# Patient Record
Sex: FEMALE | Race: WHITE | NOT HISPANIC OR LATINO | Employment: UNEMPLOYED | URBAN - NONMETROPOLITAN AREA
[De-identification: names, ages, dates, MRNs, and addresses within clinical notes are randomized per-mention and may not be internally consistent; named-entity substitution may affect disease eponyms.]

---

## 2017-09-13 ENCOUNTER — OFFICE VISIT (OUTPATIENT)
Dept: OBSTETRICS AND GYNECOLOGY | Facility: CLINIC | Age: 47
End: 2017-09-13

## 2017-09-13 VITALS
BODY MASS INDEX: 25.01 KG/M2 | DIASTOLIC BLOOD PRESSURE: 66 MMHG | HEIGHT: 68 IN | WEIGHT: 165 LBS | SYSTOLIC BLOOD PRESSURE: 110 MMHG

## 2017-09-13 DIAGNOSIS — L90.0 LICHEN SCLEROSUS: ICD-10-CM

## 2017-09-13 DIAGNOSIS — N95.1 MENOPAUSAL SYMPTOMS: ICD-10-CM

## 2017-09-13 DIAGNOSIS — Z01.419 WELL WOMAN EXAM WITH ROUTINE GYNECOLOGICAL EXAM: Primary | ICD-10-CM

## 2017-09-13 DIAGNOSIS — E03.8 OTHER SPECIFIED HYPOTHYROIDISM: ICD-10-CM

## 2017-09-13 PROCEDURE — 99396 PREV VISIT EST AGE 40-64: CPT | Performed by: NURSE PRACTITIONER

## 2017-09-13 RX ORDER — LEVOTHYROXINE SODIUM 112 UG/1
112 TABLET ORAL DAILY
Qty: 30 TABLET | Refills: 12 | Status: SHIPPED | OUTPATIENT
Start: 2017-09-13 | End: 2018-07-30 | Stop reason: SDUPTHER

## 2017-09-13 NOTE — PROGRESS NOTES
"Subjective   Heidi Fernandez is a 46 y.o. female     HPI Comments: Here for yearly check up. Has no complaints.    Gynecologic Exam   The patient's pertinent negatives include no pelvic pain, vaginal bleeding or vaginal discharge. The patient is experiencing no pain. Pertinent negatives include no abdominal pain, anorexia, back pain, chills, constipation, diarrhea, discolored urine, dysuria, fever, flank pain, frequency, headaches, hematuria, joint pain, joint swelling, nausea, painful intercourse, rash, sore throat, urgency or vomiting. She is sexually active. She uses hysterectomy for contraception.             /66  Ht 68\" (172.7 cm)  Wt 165 lb (74.8 kg)  LMP 08/31/2012  BMI 25.09 kg/m2      Outpatient Encounter Prescriptions as of 9/13/2017   Medication Sig Dispense Refill   • AMITRIPTYLINE HCL PO Take  by mouth daily.     • clobetasol (TEMOVATE) 0.05 % cream Apply 1 application topically 2 (two) times a day. 45 g 12   • estrogens, conjugated, (PREMARIN) 0.3 MG tablet Take 1 tablet by mouth Daily. 30 tablet 12   • levothyroxine (SYNTHROID, LEVOTHROID) 112 MCG tablet Take 1 tablet by mouth Daily. 30 tablet 12   • verapamil (CALAN) 80 MG tablet Take 80 mg by mouth daily.     • [DISCONTINUED] estrogens, conjugated, (PREMARIN) 0.3 MG tablet Take 1 tablet by mouth daily. Take daily for 21 days then do not take for 7 days. 30 tablet 12   • [DISCONTINUED] levothyroxine (SYNTHROID, LEVOTHROID) 112 MCG tablet Take 1 tablet by mouth daily. 30 tablet 12   • [DISCONTINUED] phentermine 37.5 MG capsule Take 1 capsule by mouth every morning. 30 capsule 0     No facility-administered encounter medications on file as of 9/13/2017.            Surgical History  Past Surgical History:   Procedure Laterality Date   • BREAST BIOPSY Right    • CYSTOSCOPY  10/29/2012   • DILATATION AND CURETTAGE  09/13/2012   • HYSTERECTOMY     • LAPAROSCOPY WITH LASER     • OTHER SURGICAL HISTORY  10/29/2012    [facility] W/T/O 250 G OR LESS: Lap Hyst " and BSO   • PAP SMEAR  07/22/2010    normal         Family History  Family History   Problem Relation Age of Onset   • Hypertension Mother    • Other Mother      cerebrovascular accident   • No Known Problems Father    • No Known Problems Sister    • No Known Problems Brother    • Cancer Maternal Aunt      breast   • Breast cancer Maternal Aunt    • Cancer Cousin      breast   • Breast cancer Cousin    • Ovarian cancer Neg Hx    • Uterine cancer Neg Hx    • Colon cancer Neg Hx    • Melanoma Neg Hx    • Prostate cancer Neg Hx              The following portions of the patient's history were reviewed and updated as appropriate: allergies, current medications, past family history, past medical history, past social history, past surgical history and problem list.    Review of Systems   Constitutional: Negative for activity change, appetite change, chills, diaphoresis, fatigue, fever and unexpected weight change.   HENT: Negative for congestion, ear discharge, ear pain, facial swelling, hearing loss, mouth sores, nosebleeds, postnasal drip, rhinorrhea, sinus pressure, sneezing, sore throat, tinnitus, trouble swallowing and voice change.    Eyes: Negative for photophobia, pain, discharge, redness, itching and visual disturbance.   Respiratory: Negative for apnea, cough, choking, chest tightness and shortness of breath.    Cardiovascular: Negative for chest pain, palpitations and leg swelling.   Gastrointestinal: Negative for abdominal distention, abdominal pain, anal bleeding, anorexia, blood in stool, constipation, diarrhea, nausea, rectal pain and vomiting.   Endocrine: Negative for cold intolerance and heat intolerance.   Genitourinary: Negative for decreased urine volume, difficulty urinating, dyspareunia, dysuria, flank pain, frequency, genital sores, hematuria, menstrual problem, pelvic pain, urgency, vaginal bleeding, vaginal discharge and vaginal pain.   Musculoskeletal: Negative for arthralgias, back pain, joint  pain, joint swelling and myalgias.   Skin: Negative for color change and rash.   Allergic/Immunologic: Negative for environmental allergies.   Neurological: Negative for dizziness, syncope, weakness, numbness and headaches.   Hematological: Negative for adenopathy.   Psychiatric/Behavioral: Negative for agitation, confusion and sleep disturbance. The patient is not nervous/anxious.              Objective   Physical Exam   Constitutional: She is oriented to person, place, and time. She appears well-developed and well-nourished.   HENT:   Head: Normocephalic and atraumatic.   Right Ear: External ear normal.   Left Ear: External ear normal.   Eyes: Conjunctivae and EOM are normal. Right eye exhibits no discharge. Left eye exhibits no discharge. No scleral icterus.   Neck: Normal range of motion. Neck supple. Carotid bruit is not present. No thyromegaly present.   Cardiovascular: Regular rhythm and normal heart sounds.    No murmur heard.  Pulmonary/Chest: Effort normal and breath sounds normal. No respiratory distress. Right breast exhibits no inverted nipple, no mass, no nipple discharge, no skin change and no tenderness. Left breast exhibits no inverted nipple, no mass, no nipple discharge, no skin change and no tenderness. Breasts are symmetrical. There is no breast swelling.   Abdominal: Soft. Bowel sounds are normal. She exhibits no distension and no mass. There is no tenderness. There is no guarding. No hernia. Hernia confirmed negative in the right inguinal area and confirmed negative in the left inguinal area.   Genitourinary: Vagina normal. Rectal exam shows no mass. No breast tenderness, discharge or bleeding. There is no rash, tenderness, lesion or injury on the right labia. There is no rash, tenderness, lesion or injury on the left labia. No erythema or bleeding in the vagina. No signs of injury around the vagina. No vaginal discharge found.   Genitourinary Comments: Cervix, Uterus and Adnexa are surgically  absent.  Urethra and urethral meatus normal  Bladder, normal no prolapse  Perineum and anus examined and without lesions   Musculoskeletal: Normal range of motion. She exhibits no edema or tenderness.   Lymphadenopathy:        Head (right side): No submental, no submandibular, no tonsillar, no preauricular, no posterior auricular and no occipital adenopathy present.        Head (left side): No submental, no submandibular, no tonsillar, no preauricular, no posterior auricular and no occipital adenopathy present.     She has no cervical adenopathy.        Right cervical: No superficial cervical, no deep cervical and no posterior cervical adenopathy present.       Left cervical: No superficial cervical, no deep cervical and no posterior cervical adenopathy present.     She has no axillary adenopathy.        Right: No inguinal adenopathy present.        Left: No inguinal adenopathy present.   Neurological: She is alert and oriented to person, place, and time. She exhibits normal muscle tone. Coordination normal.   Skin: Skin is warm and dry. No bruising and no rash noted. No erythema.   Psychiatric: She has a normal mood and affect. Her behavior is normal. Judgment and thought content normal.   Nursing note and vitals reviewed.        Assessment/Plan   Heidi was seen today for gynecologic exam.    Diagnoses and all orders for this visit:    Well woman exam with routine gynecological exam  Normal GYN exam. Will RTO for lab work. Encouraged SBE. Discussed weight management and importance of maintaining a healthy weight. Discussed Vitamin D intake and the importance of adequate vitamin D. Mammogram will be scheduled at [facility].  -     CBC & Differential  -     Comprehensive Metabolic Panel  -     Lipid Panel With LDL / HDL Ratio  -     TSH  -     T3, Uptake  -     Vitamin D 25 Hydroxy  -     T4, Free  -     Hemoglobin A1c  -     Urinalysis With / Microscopic If Indicated  -     UA / M With / Rflx Culture([facility] ONLY)  -      Mammo Screening Digital Tomosynthesis Bilateral With CAD; Future    Menopausal symptoms  Comments:  Doing well with Premarin tablets.  Orders:  -     estrogens, conjugated, (PREMARIN) 0.3 MG tablet; Take 1 tablet by mouth Daily.    Other specified hypothyroidism  Comments:  Doing well with Synthroid. Will RTO for labs.  Orders:  -     levothyroxine (SYNTHROID, LEVOTHROID) 112 MCG tablet; Take 1 tablet by mouth Daily.    Lichen sclerosus  Comments:  Doing well right now. Does not need refill on Temovate at this time. Will call when she does.

## 2017-09-18 ENCOUNTER — HOSPITAL ENCOUNTER (OUTPATIENT)
Dept: MAMMOGRAPHY | Facility: HOSPITAL | Age: 47
Discharge: HOME | End: 2017-09-18
Admitting: NURSE PRACTITIONER

## 2017-09-18 DIAGNOSIS — Z01.419 WELL WOMAN EXAM WITH ROUTINE GYNECOLOGICAL EXAM: ICD-10-CM

## 2017-09-18 PROCEDURE — G0202 SCR MAMMO BI INCL CAD: HCPCS

## 2017-09-18 PROCEDURE — 77063 BREAST TOMOSYNTHESIS BI: CPT

## 2017-09-19 LAB
25(OH)D3+25(OH)D2 SERPL-MCNC: 30.7 NG/ML (ref 30–100)
ALBUMIN SERPL-MCNC: 4.2 G/DL (ref 3.5–5)
ALBUMIN/GLOB SERPL: 1.4 G/DL (ref 1.1–2.5)
ALP SERPL-CCNC: 93 U/L (ref 24–120)
ALT SERPL-CCNC: 23 U/L (ref 0–54)
APPEARANCE UR: CLEAR
AST SERPL-CCNC: 22 U/L (ref 7–45)
BACTERIA #/AREA URNS HPF: ABNORMAL /HPF
BASOPHILS # BLD AUTO: 0.05 10*3/MM3 (ref 0–0.2)
BASOPHILS NFR BLD AUTO: 0.9 % (ref 0–2)
BILIRUB SERPL-MCNC: 0.2 MG/DL (ref 0.1–1)
BILIRUB UR QL STRIP: NEGATIVE
BUN SERPL-MCNC: 9 MG/DL (ref 5–21)
BUN/CREAT SERPL: 15 (ref 7–25)
CALCIUM SERPL-MCNC: 9.1 MG/DL (ref 8.4–10.4)
CHLORIDE SERPL-SCNC: 103 MMOL/L (ref 98–110)
CHOLEST SERPL-MCNC: 223 MG/DL (ref 130–200)
CO2 SERPL-SCNC: 31 MMOL/L (ref 24–31)
COLOR UR: YELLOW
CREAT SERPL-MCNC: 0.6 MG/DL (ref 0.5–1.4)
EOSINOPHIL # BLD AUTO: 0.22 10*3/MM3 (ref 0–0.7)
EOSINOPHIL NFR BLD AUTO: 3.9 % (ref 0–4)
EPI CELLS #/AREA URNS HPF: ABNORMAL /HPF
ERYTHROCYTE [DISTWIDTH] IN BLOOD BY AUTOMATED COUNT: 13.9 % (ref 12–15)
GLOBULIN SER CALC-MCNC: 2.9 GM/DL
GLUCOSE SERPL-MCNC: 81 MG/DL (ref 70–100)
GLUCOSE UR QL: NEGATIVE
HBA1C MFR BLD: 5.7 %
HCT VFR BLD AUTO: 40.2 % (ref 37–47)
HDLC SERPL-MCNC: 71 MG/DL
HGB BLD-MCNC: 12.7 G/DL (ref 12–16)
HGB UR QL STRIP: NEGATIVE
IMM GRANULOCYTES # BLD: 0.01 10*3/MM3 (ref 0–0.03)
IMM GRANULOCYTES NFR BLD: 0.2 % (ref 0–5)
KETONES UR QL STRIP: NEGATIVE
LDLC SERPL CALC-MCNC: 124 MG/DL (ref 0–99)
LDLC/HDLC SERPL: 1.75 {RATIO}
LEUKOCYTE ESTERASE UR QL STRIP: ABNORMAL
LYMPHOCYTES # BLD AUTO: 2.25 10*3/MM3 (ref 0.72–4.86)
LYMPHOCYTES NFR BLD AUTO: 40.4 % (ref 15–45)
MCH RBC QN AUTO: 29.1 PG (ref 28–32)
MCHC RBC AUTO-ENTMCNC: 31.6 G/DL (ref 33–36)
MCV RBC AUTO: 92.2 FL (ref 82–98)
MONOCYTES # BLD AUTO: 0.52 10*3/MM3 (ref 0.19–1.3)
MONOCYTES NFR BLD AUTO: 9.3 % (ref 4–12)
NEUTROPHILS # BLD AUTO: 2.52 10*3/MM3 (ref 1.87–8.4)
NEUTROPHILS NFR BLD AUTO: 45.3 % (ref 39–78)
NITRITE UR QL STRIP: NEGATIVE
PH UR STRIP: 7 [PH] (ref 5–8)
PLATELET # BLD AUTO: 280 10*3/MM3 (ref 130–400)
POTASSIUM SERPL-SCNC: 4.1 MMOL/L (ref 3.5–5.3)
PROT SERPL-MCNC: 7.1 G/DL (ref 6.3–8.7)
PROT UR QL STRIP: NEGATIVE
RBC # BLD AUTO: 4.36 10*6/MM3 (ref 4.2–5.4)
RBC #/AREA URNS HPF: ABNORMAL /HPF
SODIUM SERPL-SCNC: 141 MMOL/L (ref 135–145)
SP GR UR: 1.01 (ref 1–1.03)
T3RU NFR SERPL: 22 % (ref 24–39)
T4 FREE SERPL-MCNC: 0.99 NG/DL (ref 0.78–2.19)
TRIGL SERPL-MCNC: 140 MG/DL (ref 0–149)
TSH SERPL DL<=0.005 MIU/L-ACNC: 0.56 MIU/ML (ref 0.47–4.68)
UROBILINOGEN UR STRIP-MCNC: ABNORMAL MG/DL
VLDLC SERPL CALC-MCNC: 28 MG/DL
WBC # BLD AUTO: 5.57 10*3/MM3 (ref 4.8–10.8)
WBC #/AREA URNS HPF: ABNORMAL /HPF

## 2018-03-07 DIAGNOSIS — N90.4 LICHEN SCLEROSUS OF FEMALE GENITALIA: ICD-10-CM

## 2018-03-07 RX ORDER — CLOBETASOL PROPIONATE 0.5 MG/G
1 CREAM TOPICAL 2 TIMES DAILY
Qty: 45 G | Refills: 6 | Status: SHIPPED | OUTPATIENT
Start: 2018-03-07 | End: 2018-10-29 | Stop reason: SDUPTHER

## 2018-07-30 DIAGNOSIS — E03.8 OTHER SPECIFIED HYPOTHYROIDISM: ICD-10-CM

## 2018-07-30 RX ORDER — LEVOTHYROXINE SODIUM 112 UG/1
112 TABLET ORAL DAILY
Qty: 30 TABLET | Refills: 1 | Status: SHIPPED | OUTPATIENT
Start: 2018-07-30 | End: 2018-10-01 | Stop reason: SDUPTHER

## 2018-09-26 DIAGNOSIS — N95.1 MENOPAUSAL SYMPTOMS: ICD-10-CM

## 2018-09-26 RX ORDER — CONJUGATED ESTROGENS 0.3 MG/1
TABLET, FILM COATED ORAL
Qty: 30 TABLET | Refills: 12 | OUTPATIENT
Start: 2018-09-26

## 2018-09-27 DIAGNOSIS — N95.1 MENOPAUSAL SYMPTOMS: ICD-10-CM

## 2018-10-01 DIAGNOSIS — E03.8 OTHER SPECIFIED HYPOTHYROIDISM: ICD-10-CM

## 2018-10-01 RX ORDER — LEVOTHYROXINE SODIUM 112 UG/1
112 TABLET ORAL DAILY
Qty: 30 TABLET | Refills: 0 | Status: SHIPPED | OUTPATIENT
Start: 2018-10-01 | End: 2022-02-15 | Stop reason: SDUPTHER

## 2018-10-25 DIAGNOSIS — N95.1 MENOPAUSAL SYMPTOMS: ICD-10-CM

## 2018-10-29 ENCOUNTER — OFFICE VISIT (OUTPATIENT)
Dept: OBSTETRICS AND GYNECOLOGY | Facility: CLINIC | Age: 48
End: 2018-10-29

## 2018-10-29 VITALS
DIASTOLIC BLOOD PRESSURE: 64 MMHG | WEIGHT: 158 LBS | BODY MASS INDEX: 23.95 KG/M2 | HEIGHT: 68 IN | SYSTOLIC BLOOD PRESSURE: 102 MMHG

## 2018-10-29 DIAGNOSIS — N90.4 LICHEN SCLEROSUS OF FEMALE GENITALIA: ICD-10-CM

## 2018-10-29 DIAGNOSIS — Z80.3 FAMILY HISTORY OF BREAST CANCER: ICD-10-CM

## 2018-10-29 DIAGNOSIS — Z01.419 WELL WOMAN EXAM WITH ROUTINE GYNECOLOGICAL EXAM: Primary | ICD-10-CM

## 2018-10-29 DIAGNOSIS — N95.1 MENOPAUSAL SYMPTOMS: ICD-10-CM

## 2018-10-29 PROCEDURE — 99396 PREV VISIT EST AGE 40-64: CPT | Performed by: NURSE PRACTITIONER

## 2018-10-29 RX ORDER — CLOBETASOL PROPIONATE 0.5 MG/G
1 CREAM TOPICAL 2 TIMES DAILY
Qty: 45 G | Refills: 6 | Status: SHIPPED | OUTPATIENT
Start: 2018-10-29 | End: 2021-07-02 | Stop reason: SDUPTHER

## 2018-10-29 NOTE — PATIENT INSTRUCTIONS

## 2018-10-29 NOTE — PROGRESS NOTES
"Subjective     Heidi Fernandez is a 47 y.o. female    Here for yearly check up. Has no evidence of lichens at this time.      Gynecologic Exam   The patient's pertinent negatives include no pelvic pain, vaginal bleeding or vaginal discharge. The patient is experiencing no pain. Pertinent negatives include no abdominal pain, anorexia, back pain, chills, constipation, diarrhea, discolored urine, dysuria, fever, flank pain, frequency, headaches, hematuria, joint pain, joint swelling, nausea, painful intercourse, rash, sore throat, urgency or vomiting. She is sexually active. She uses hysterectomy for contraception.         /64   Ht 172.7 cm (68\")   Wt 71.7 kg (158 lb)   LMP 08/31/2012 Comment: bleeding  BMI 24.02 kg/m²     Outpatient Encounter Prescriptions as of 10/29/2018   Medication Sig Dispense Refill   • AMITRIPTYLINE HCL PO Take  by mouth daily.     • clobetasol (TEMOVATE) 0.05 % cream Apply 1 application topically to the appropriate area as directed 2 (Two) Times a Day. 45 g 6   • estrogens, conjugated, (PREMARIN) 0.3 MG tablet Take 1 tablet by mouth Daily. 30 tablet 12   • levothyroxine (SYNTHROID, LEVOTHROID) 112 MCG tablet Take 1 tablet by mouth Daily. 30 tablet 0   • verapamil (CALAN) 80 MG tablet Take 80 mg by mouth daily.     • [DISCONTINUED] clobetasol (TEMOVATE) 0.05 % cream Apply 1 application topically 2 (Two) Times a Day. 45 g 6   • [DISCONTINUED] estrogens, conjugated, (PREMARIN) 0.3 MG tablet Take 1 tablet by mouth Daily. 30 tablet 0     No facility-administered encounter medications on file as of 10/29/2018.        Surgical History  Past Surgical History:   Procedure Laterality Date   • BREAST BIOPSY Right    • CYSTOSCOPY  10/29/2012   • DILATATION AND CURETTAGE  09/13/2012   • HYSTERECTOMY     • LAPAROSCOPY WITH LASER     • OOPHORECTOMY     • OTHER SURGICAL HISTORY  10/29/2012    [facility] W/T/O 250 G OR LESS: Lap Hyst and BSO   • PAP SMEAR  07/22/2010    normal       Family History  Family " History   Problem Relation Age of Onset   • Hypertension Mother    • Other Mother         cerebrovascular accident   • No Known Problems Father    • No Known Problems Sister    • No Known Problems Brother    • Cancer Maternal Aunt         breast   • Breast cancer Maternal Aunt 64   • Cancer Cousin         breast   • Breast cancer Cousin 45   • No Known Problems Daughter    • No Known Problems Son    • No Known Problems Maternal Grandmother    • No Known Problems Paternal Grandmother    • No Known Problems Paternal Aunt    • Ovarian cancer Neg Hx    • Uterine cancer Neg Hx    • Colon cancer Neg Hx    • Melanoma Neg Hx    • Prostate cancer Neg Hx    • BRCA 1/2 Neg Hx    • Endometrial cancer Neg Hx        The following portions of the patient's history were reviewed and updated as appropriate: allergies, current medications, past family history, past medical history, past social history, past surgical history and problem list.    Review of Systems   Constitutional: Negative for activity change, appetite change, chills, diaphoresis, fatigue, fever, unexpected weight gain and unexpected weight loss.   HENT: Negative for congestion, dental problem, drooling, ear discharge, ear pain, facial swelling, hearing loss, mouth sores, nosebleeds, postnasal drip, rhinorrhea, sinus pressure, sneezing, sore throat, tinnitus, trouble swallowing and voice change.    Eyes: Negative for blurred vision, double vision, photophobia, pain, discharge, redness, itching and visual disturbance.   Respiratory: Negative for apnea, cough, choking, chest tightness, shortness of breath, wheezing and stridor.    Cardiovascular: Negative for chest pain, palpitations and leg swelling.   Gastrointestinal: Negative for abdominal distention, abdominal pain, anal bleeding, anorexia, blood in stool, constipation, diarrhea, nausea, rectal pain, vomiting, GERD and indigestion.   Endocrine: Negative for cold intolerance, heat intolerance, polydipsia, polyphagia  and polyuria.   Genitourinary: Negative for breast discharge, urinary incontinence, decreased libido, decreased urine volume, difficulty urinating, dyspareunia, dysuria, flank pain, frequency, genital sores, hematuria, menstrual problem, pelvic pain, urgency, vaginal bleeding, vaginal discharge, vaginal pain, breast lump and breast pain.   Musculoskeletal: Negative for arthralgias, back pain, gait problem, joint pain, joint swelling, myalgias, neck pain, neck stiffness and bursitis.   Skin: Negative for color change, dry skin and rash.   Allergic/Immunologic: Negative for environmental allergies, food allergies and immunocompromised state.   Neurological: Negative for dizziness, tremors, seizures, syncope, facial asymmetry, speech difficulty, weakness, light-headedness, numbness, headache, memory problem and confusion.   Hematological: Negative for adenopathy. Does not bruise/bleed easily.   Psychiatric/Behavioral: Negative for agitation, behavioral problems, decreased concentration, dysphoric mood, hallucinations, self-injury, sleep disturbance, suicidal ideas, negative for hyperactivity, depressed mood and stress. The patient is not nervous/anxious.        Objective   Physical Exam   Constitutional: She is oriented to person, place, and time. She appears well-developed and well-nourished.   HENT:   Head: Normocephalic and atraumatic.   Right Ear: External ear normal.   Left Ear: External ear normal.   Eyes: Conjunctivae and EOM are normal. Right eye exhibits no discharge. Left eye exhibits no discharge. No scleral icterus.   Neck: Normal range of motion. Neck supple. Carotid bruit is not present. No thyromegaly present.   Cardiovascular: Regular rhythm and normal heart sounds.    No murmur heard.  Pulmonary/Chest: Effort normal and breath sounds normal. No respiratory distress. Right breast exhibits no inverted nipple, no mass, no nipple discharge, no skin change and no tenderness. Left breast exhibits no inverted  nipple, no mass, no nipple discharge, no skin change and no tenderness. Breasts are symmetrical. There is no breast swelling.   Abdominal: Soft. Bowel sounds are normal. She exhibits no distension and no mass. There is no tenderness. There is no guarding. No hernia. Hernia confirmed negative in the right inguinal area and confirmed negative in the left inguinal area.   Genitourinary: Vagina normal. Rectal exam shows no mass. No breast tenderness, discharge or bleeding. There is no rash, tenderness, lesion or injury on the right labia. There is no rash, tenderness, lesion or injury on the left labia. No erythema or bleeding in the vagina. No signs of injury around the vagina. No vaginal discharge found.   Genitourinary Comments: Cervix, Uterus and Adnexa are surgically absent.  Urethra and urethral meatus normal  Bladder, normal no prolapse  Perineum and anus examined and without lesions   Musculoskeletal: Normal range of motion. She exhibits no edema or tenderness.   Lymphadenopathy:        Head (right side): No submental, no submandibular, no tonsillar, no preauricular, no posterior auricular and no occipital adenopathy present.        Head (left side): No submental, no submandibular, no tonsillar, no preauricular, no posterior auricular and no occipital adenopathy present.     She has no cervical adenopathy.        Right cervical: No superficial cervical, no deep cervical and no posterior cervical adenopathy present.       Left cervical: No superficial cervical, no deep cervical and no posterior cervical adenopathy present.     She has no axillary adenopathy.        Right: No inguinal adenopathy present.        Left: No inguinal adenopathy present.   Neurological: She is alert and oriented to person, place, and time. She exhibits normal muscle tone. Coordination normal.   Skin: Skin is warm and dry. No bruising and no rash noted. No erythema.   Psychiatric: She has a normal mood and affect. Her behavior is normal.  Judgment and thought content normal.   Nursing note and vitals reviewed.      Assessment/Plan   Heidi was seen today for gynecologic exam.    Diagnoses and all orders for this visit:    Well woman exam with routine gynecological exam  Normal GYN exam. Will RTO for lab work. Encouraged SBE, pt is aware how to do self breast exam and the importance of same. Discussed weight management and importance of maintaining a healthy weight. Discussed Vitamin D intake and the importance of adequate vitamin D for both Bone Health and a healthy immune system.  Discussed Daily exercise and the importance of same, in regards to a healthy heart as well as helping to maintain her weight and improving her mental health.  BMI  24. Mammogram and Bone Density will be scheduled at [facility]. Pap smear is not done per [facility] guidelines.  -     DEXA Bone Density Axial; Future  -     Mammo Screening Digital Tomosynthesis Bilateral With CAD; Future  -     CBC & Differential  -     Comprehensive Metabolic Panel  -     Lipid Panel With LDL / HDL Ratio  -     TSH  -     T3, Uptake  -     Vitamin D 25 Hydroxy  -     T4, Free  -     Hemoglobin A1c  -     UA / M With / Rflx Culture([facility] ONLY) - Urine, Clean Catch    Family history of breast cancer  Pt has a family history of breast Cancer. We discussed [facility] Genetic screening that can be done to see if she carries the Gene for Breast, Ovarian, Colon, Melanoma, Uterine and Pancreatic Cancers. We discussed if positive she will have free Genetic counseling through [facility]. We also discussed if she does have the positive gene she can have more testing yearly and even surgery if desired.    Lichen sclerosus of female genitalia  Comments:  Doing well with Temovate cream. No evidence of lichens today. Rf given.  Orders:  -     clobetasol (TEMOVATE) 0.05 % cream; Apply 1 application topically to the appropriate area as directed 2 (Two) Times a Day.    Menopausal symptoms  Comments:  Doing well with Premarin  tablets. RF given.  Orders:  -     estrogens, conjugated, (PREMARIN) 0.3 MG tablet; Take 1 tablet by mouth Daily.         Patient's Body mass index is 24.02 kg/m². BMI is within normal parameters. No follow-up required.      Vaishnavi Conley, APRN  10/29/2018

## 2018-10-29 NOTE — PROGRESS NOTES
Attempted to obtain health maintenance information, patient unable to provide answers for the following items Tdap, Colonoscopy, Pneumococcal Vaccine, Medicare Annual Wellness Exam, Diabetic Eye Exam, Diabetic Foot Exam, HPV Vaccine, Chlamydia Screening , Influenza Vaccine, HgbA1c and Zoster Vaccine.

## 2018-10-31 ENCOUNTER — HOSPITAL ENCOUNTER (OUTPATIENT)
Dept: MAMMOGRAPHY | Facility: HOSPITAL | Age: 48
Discharge: HOME | End: 2018-10-31
Admitting: NURSE PRACTITIONER

## 2018-10-31 ENCOUNTER — HOSPITAL ENCOUNTER (OUTPATIENT)
Dept: BONE DENSITY | Facility: HOSPITAL | Age: 48
Discharge: HOME | End: 2018-10-31

## 2018-10-31 DIAGNOSIS — Z01.419 WELL WOMAN EXAM WITH ROUTINE GYNECOLOGICAL EXAM: ICD-10-CM

## 2018-10-31 PROCEDURE — 77067 SCR MAMMO BI INCL CAD: CPT

## 2018-10-31 PROCEDURE — 77063 BREAST TOMOSYNTHESIS BI: CPT

## 2018-10-31 PROCEDURE — 77080 DXA BONE DENSITY AXIAL: CPT

## 2018-11-01 LAB
25(OH)D3+25(OH)D2 SERPL-MCNC: 63.3 NG/ML (ref 30–100)
ALBUMIN SERPL-MCNC: 4.2 G/DL (ref 3.5–5)
ALBUMIN/GLOB SERPL: 1.6 G/DL (ref 1.1–2.5)
ALP SERPL-CCNC: 84 U/L (ref 24–120)
ALT SERPL-CCNC: 38 U/L (ref 0–54)
APPEARANCE UR: CLEAR
AST SERPL-CCNC: 28 U/L (ref 7–45)
BACTERIA #/AREA URNS HPF: NORMAL /HPF
BASOPHILS # BLD AUTO: 0.04 10*3/MM3 (ref 0–0.2)
BASOPHILS NFR BLD AUTO: 0.8 % (ref 0–2)
BILIRUB SERPL-MCNC: 0.2 MG/DL (ref 0.1–1)
BILIRUB UR QL STRIP: NEGATIVE
BUN SERPL-MCNC: 11 MG/DL (ref 5–21)
BUN/CREAT SERPL: 16.7 (ref 7–25)
CALCIUM SERPL-MCNC: 9.4 MG/DL (ref 8.4–10.4)
CHLORIDE SERPL-SCNC: 100 MMOL/L (ref 98–110)
CHOLEST SERPL-MCNC: 188 MG/DL (ref 130–200)
CO2 SERPL-SCNC: 31 MMOL/L (ref 24–31)
COLOR UR: YELLOW
CREAT SERPL-MCNC: 0.66 MG/DL (ref 0.5–1.4)
EOSINOPHIL # BLD AUTO: 0.19 10*3/MM3 (ref 0–0.7)
EOSINOPHIL NFR BLD AUTO: 3.8 % (ref 0–4)
EPI CELLS #/AREA URNS HPF: NORMAL /HPF
ERYTHROCYTE [DISTWIDTH] IN BLOOD BY AUTOMATED COUNT: 13.2 % (ref 12–15)
GLOBULIN SER CALC-MCNC: 2.7 GM/DL
GLUCOSE SERPL-MCNC: 85 MG/DL (ref 70–100)
GLUCOSE UR QL: NEGATIVE
HBA1C MFR BLD: 5.6 %
HCT VFR BLD AUTO: 39.2 % (ref 37–47)
HDLC SERPL-MCNC: 73 MG/DL
HGB BLD-MCNC: 12.4 G/DL (ref 12–16)
HGB UR QL STRIP: NEGATIVE
IMM GRANULOCYTES # BLD: 0.01 10*3/MM3 (ref 0–0.03)
IMM GRANULOCYTES NFR BLD: 0.2 % (ref 0–5)
KETONES UR QL STRIP: NEGATIVE
LDLC SERPL CALC-MCNC: 94 MG/DL (ref 0–99)
LDLC/HDLC SERPL: 1.28 {RATIO}
LEUKOCYTE ESTERASE UR QL STRIP: NEGATIVE
LYMPHOCYTES # BLD AUTO: 1.96 10*3/MM3 (ref 0.72–4.86)
LYMPHOCYTES NFR BLD AUTO: 38.8 % (ref 15–45)
MCH RBC QN AUTO: 29.2 PG (ref 28–32)
MCHC RBC AUTO-ENTMCNC: 31.6 G/DL (ref 33–36)
MCV RBC AUTO: 92.2 FL (ref 82–98)
MICRO URNS: NORMAL
MICRO URNS: NORMAL
MONOCYTES # BLD AUTO: 0.36 10*3/MM3 (ref 0.19–1.3)
MONOCYTES NFR BLD AUTO: 7.1 % (ref 4–12)
MUCOUS THREADS URNS QL MICRO: PRESENT /HPF
NEUTROPHILS # BLD AUTO: 2.49 10*3/MM3 (ref 1.87–8.4)
NEUTROPHILS NFR BLD AUTO: 49.3 % (ref 39–78)
NITRITE UR QL STRIP: NEGATIVE
NRBC BLD AUTO-RTO: 0 /100 WBC (ref 0–0)
PH UR STRIP: 7.5 [PH] (ref 5–7.5)
PLATELET # BLD AUTO: 253 10*3/MM3 (ref 130–400)
POTASSIUM SERPL-SCNC: 5 MMOL/L (ref 3.5–5.3)
PROT SERPL-MCNC: 6.9 G/DL (ref 6.3–8.7)
PROT UR QL STRIP: NEGATIVE
RBC # BLD AUTO: 4.25 10*6/MM3 (ref 4.2–5.4)
RBC #/AREA URNS HPF: NORMAL /HPF
SODIUM SERPL-SCNC: 141 MMOL/L (ref 135–145)
SP GR UR: 1.01 (ref 1–1.03)
T3RU NFR SERPL: 23 % (ref 24–39)
T4 FREE SERPL-MCNC: 1.14 NG/DL (ref 0.78–2.19)
TRIGL SERPL-MCNC: 107 MG/DL (ref 0–149)
TSH SERPL DL<=0.005 MIU/L-ACNC: 0.19 MIU/ML (ref 0.47–4.68)
URINALYSIS REFLEX: NORMAL
UROBILINOGEN UR STRIP-MCNC: 0.2 MG/DL (ref 0.2–1)
VLDLC SERPL CALC-MCNC: 21.4 MG/DL
WBC # BLD AUTO: 5.05 10*3/MM3 (ref 4.8–10.8)
WBC #/AREA URNS HPF: NORMAL /HPF

## 2019-01-03 ENCOUNTER — TELEPHONE (OUTPATIENT)
Dept: OBSTETRICS AND GYNECOLOGY | Facility: CLINIC | Age: 49
End: 2019-01-03

## 2019-11-25 DIAGNOSIS — N95.1 MENOPAUSAL SYMPTOMS: ICD-10-CM

## 2019-11-25 RX ORDER — CONJUGATED ESTROGENS 0.3 MG/1
TABLET, FILM COATED ORAL
Qty: 30 TABLET | Refills: 12 | Status: CANCELLED | OUTPATIENT
Start: 2019-11-25

## 2019-11-26 ENCOUNTER — TELEPHONE (OUTPATIENT)
Dept: OBSTETRICS AND GYNECOLOGY | Facility: CLINIC | Age: 49
End: 2019-11-26

## 2019-11-26 DIAGNOSIS — N95.1 MENOPAUSAL SYMPTOMS: ICD-10-CM

## 2019-11-26 NOTE — TELEPHONE ENCOUNTER
Pt calls - has OV scheduled for 12/6.  Requesting refill on Premarin to get to her appt.  Ordered.

## 2019-12-06 ENCOUNTER — OFFICE VISIT (OUTPATIENT)
Dept: OBSTETRICS AND GYNECOLOGY | Facility: CLINIC | Age: 49
End: 2019-12-06

## 2019-12-06 VITALS
DIASTOLIC BLOOD PRESSURE: 68 MMHG | WEIGHT: 148 LBS | SYSTOLIC BLOOD PRESSURE: 112 MMHG | BODY MASS INDEX: 22.43 KG/M2 | HEIGHT: 68 IN

## 2019-12-06 DIAGNOSIS — R53.83 FATIGUE, UNSPECIFIED TYPE: ICD-10-CM

## 2019-12-06 DIAGNOSIS — Z12.11 COLON CANCER SCREENING: ICD-10-CM

## 2019-12-06 DIAGNOSIS — E55.9 VITAMIN D DEFICIENCY: ICD-10-CM

## 2019-12-06 DIAGNOSIS — Z12.39 ENCOUNTER FOR SCREENING FOR MALIGNANT NEOPLASM OF BREAST: ICD-10-CM

## 2019-12-06 DIAGNOSIS — Z01.419 WELL WOMAN EXAM WITH ROUTINE GYNECOLOGICAL EXAM: Primary | ICD-10-CM

## 2019-12-06 DIAGNOSIS — N95.1 MENOPAUSAL SYMPTOMS: ICD-10-CM

## 2019-12-06 PROCEDURE — 99396 PREV VISIT EST AGE 40-64: CPT | Performed by: NURSE PRACTITIONER

## 2019-12-06 RX ORDER — AMITRIPTYLINE HYDROCHLORIDE 150 MG/1
150 TABLET, FILM COATED ORAL
Refills: 0 | COMMUNITY
Start: 2019-10-17 | End: 2022-02-15 | Stop reason: SDUPTHER

## 2019-12-06 NOTE — PROGRESS NOTES
Subjective   Heidi Fernandez is a 49 y.o. female  YOB: 1970        Chief Complaint   Patient presents with   • Gynecologic Exam     Patient here for yearly exam. Last exam was done 10/29/18.  Dexa and Mammo done 10/31/18 at [facility] and were normal. Patient needs order for mammogram. Patient has had a TLH w/ BSO due to bleeding issues.  Patient is on Premarin 0.3mg tablets and does well with them. Patient needs refills sent to her pharmacy.  Patient would like to have yearly labs done today. Patient voices no complaints or concerns today.        Gynecologic Exam   The patient's pertinent negatives include no pelvic pain. Pertinent negatives include no abdominal pain, back pain, constipation, diarrhea, dysuria, fever, frequency, hematuria, nausea, rash, sore throat, urgency or vomiting.       The following portions of the patient's history were reviewed and updated as appropriate: allergies, current medications, past family history, past medical history, past social history, past surgical history and problem list.    No Known Allergies    Past Medical History:   Diagnosis Date   • Anemia    • Cyst, ovary, follicular    • Hypothyroid    • Lichen sclerosus        Family History   Problem Relation Age of Onset   • Hypertension Mother    • Other Mother         cerebrovascular accident   • No Known Problems Father    • No Known Problems Sister    • No Known Problems Brother    • Cancer Maternal Aunt         breast   • Breast cancer Maternal Aunt 64   • Cancer Cousin         breast   • Breast cancer Cousin 45   • No Known Problems Daughter    • No Known Problems Son    • No Known Problems Maternal Grandmother    • No Known Problems Paternal Grandmother    • No Known Problems Paternal Aunt    • Ovarian cancer Neg Hx    • Uterine cancer Neg Hx    • Colon cancer Neg Hx    • Melanoma Neg Hx    • Prostate cancer Neg Hx    • BRCA 1/2 Neg Hx    • Endometrial cancer Neg Hx        Social History     Socioeconomic History    • Marital status:      Spouse name: Not on file   • Number of children: Not on file   • Years of education: Not on file   • Highest education level: Not on file   Tobacco Use   • Smoking status: Never Smoker   • Smokeless tobacco: Never Used   Substance and Sexual Activity   • Alcohol use: No   • Drug use: No   • Sexual activity: Yes     Birth control/protection: Post-menopausal         Current Outpatient Medications:   •  amitriptyline (ELAVIL) 150 MG tablet, Take 150 mg by mouth every night at bedtime., Disp: , Rfl: 0  •  clobetasol (TEMOVATE) 0.05 % cream, Apply 1 application topically to the appropriate area as directed 2 (Two) Times a Day., Disp: 45 g, Rfl: 6  •  estrogens, conjugated, (PREMARIN) 0.3 MG tablet, Take 1 tablet by mouth Daily., Disp: 90 tablet, Rfl: 3  •  levothyroxine (SYNTHROID, LEVOTHROID) 112 MCG tablet, Take 1 tablet by mouth Daily., Disp: 30 tablet, Rfl: 0  •  verapamil (CALAN) 80 MG tablet, Take 80 mg by mouth daily., Disp: , Rfl:     Patient's last menstrual period was 08/31/2012.    Sexual History:         Could not be calculated    Past Surgical History:   Procedure Laterality Date   • BILATERAL SALPINGO OOPHORECTOMY     • BREAST BIOPSY Right    • DILATATION AND CURETTAGE  09/13/2012   • HYSTERECTOMY      TLH w/ BSO due to bleeding issues   • LAPAROSCOPY WITH LASER     • OTHER SURGICAL HISTORY  10/29/2012    TLH W/T/O 250 G OR LESS: Lap Hyst and BSO       Review of Systems   Constitutional: Negative for activity change, appetite change, fatigue, fever, unexpected weight gain and unexpected weight loss.   HENT: Negative for congestion, ear pain, hearing loss, nosebleeds, rhinorrhea, sore throat, tinnitus and trouble swallowing.    Eyes: Negative for blurred vision, pain, discharge, itching and visual disturbance.   Respiratory: Negative for apnea, chest tightness, shortness of breath and wheezing.    Cardiovascular: Negative for chest pain and leg swelling.   Gastrointestinal:  Negative for abdominal pain, blood in stool, constipation, diarrhea, nausea, vomiting and GERD.   Endocrine: Negative for heat intolerance, polydipsia and polyuria.   Genitourinary: Negative for breast lump, decreased libido, difficulty urinating, dyspareunia, dysuria, frequency, genital sores, hematuria, menstrual problem, pelvic pain, urgency, urinary incontinence and vaginal pain.   Musculoskeletal: Negative for arthralgias, back pain, joint swelling and myalgias.   Skin: Negative for color change, rash and skin lesions.   Allergic/Immunologic: Negative for environmental allergies, food allergies and immunocompromised state.   Neurological: Negative for dizziness, tremors, seizures, syncope, facial asymmetry, numbness and headache.   Hematological: Negative for adenopathy. Does not bruise/bleed easily.   Psychiatric/Behavioral: Negative for agitation, hallucinations, sleep disturbance, suicidal ideas and depressed mood. The patient is not nervous/anxious.        Objective   Physical Exam   Constitutional: She is oriented to person, place, and time. She appears well-developed and well-nourished. No distress.   HENT:   Head: Normocephalic.   Right Ear: External ear normal.   Left Ear: External ear normal.   Nose: Nose normal.   Mouth/Throat: Oropharynx is clear and moist.   Eyes: Conjunctivae are normal. Right eye exhibits no discharge. Left eye exhibits no discharge. No scleral icterus.   Neck: Normal range of motion. Neck supple. Carotid bruit is not present. No tracheal deviation present. No thyromegaly present.   Cardiovascular: Normal rate, regular rhythm, normal heart sounds and intact distal pulses.   No murmur heard.  Pulmonary/Chest: Effort normal and breath sounds normal. No respiratory distress. She has no wheezes. Right breast exhibits no inverted nipple, no mass, no nipple discharge, no skin change and no tenderness. Left breast exhibits no inverted nipple, no mass, no nipple discharge, no skin change  and no tenderness. Breasts are symmetrical. There is no breast swelling.   Abdominal: Soft. She exhibits no distension and no mass. There is no tenderness. There is no guarding. No hernia. Hernia confirmed negative in the right inguinal area and confirmed negative in the left inguinal area.   Genitourinary: Rectum normal, vagina normal and uterus normal. Rectal exam shows no mass. No breast tenderness, discharge or bleeding. Pelvic exam was performed with patient supine. There is no rash, tenderness, lesion or injury on the right labia. There is no rash, tenderness, lesion or injury on the left labia. Uterus is not enlarged, not fixed and not tender. Cervix exhibits no motion tenderness, no discharge and no friability. Right adnexum displays no mass, no tenderness and no fullness. Left adnexum displays no mass, no tenderness and no fullness. No erythema, tenderness or bleeding in the vagina. No foreign body in the vagina. No signs of injury around the vagina. No vaginal discharge found.   Genitourinary Comments:   BSU normal  Urethral meatus  Normal  Perineum  Normal   Musculoskeletal: Normal range of motion. She exhibits no edema or tenderness.   Lymphadenopathy:        Head (right side): No submental, no submandibular, no tonsillar, no preauricular, no posterior auricular and no occipital adenopathy present.        Head (left side): No submental, no submandibular, no tonsillar, no preauricular, no posterior auricular and no occipital adenopathy present.     She has no cervical adenopathy.        Right cervical: No superficial cervical, no deep cervical and no posterior cervical adenopathy present.       Left cervical: No superficial cervical, no deep cervical and no posterior cervical adenopathy present.     She has no axillary adenopathy.        Right: No inguinal adenopathy present.        Left: No inguinal adenopathy present.   Neurological: She is alert and oriented to person, place, and time. Coordination  "normal.   Skin: Skin is warm and dry. No bruising and no rash noted. She is not diaphoretic. No erythema.   Psychiatric: She has a normal mood and affect. Her behavior is normal. Judgment and thought content normal.   Nursing note and vitals reviewed.        Vitals:    12/06/19 1013   BP: 112/68   Weight: 67.1 kg (148 lb)   Height: 172.7 cm (68\")       Heidi was seen today for gynecologic exam.    Diagnoses and all orders for this visit:    Well woman exam with routine gynecological exam  Comments:  Normal well woman exam.  Mammogram ordered.  Orders:  -     CBC & Differential  -     Comprehensive Metabolic Panel  -     Hemoglobin A1c  -     Lipid Panel With LDL / HDL Ratio  -     T3, Uptake  -     T4, Free  -     TSH  -     Vitamin D 25 Hydroxy    Encounter for screening for malignant neoplasm of breast  Comments:  Mammogram ordered.  Orders:  -     Mammo Screening Digital Tomosynthesis Bilateral With CAD    Menopausal symptoms  Comments:  Doing well with Premarin tablets.  Orders:  -     estrogens, conjugated, (PREMARIN) 0.3 MG tablet; Take 1 tablet by mouth Daily.    Vitamin D deficiency  Comments:  To recheck vitamin D level today.  Orders:  -     Vitamin D 25 Hydroxy    Fatigue, unspecified type  Comments:  Yearly lab panel done.  Orders:  -     CBC & Differential  -     Comprehensive Metabolic Panel  -     Hemoglobin A1c  -     Lipid Panel With LDL / HDL Ratio  -     T3, Uptake  -     T4, Free  -     TSH  -     Vitamin D 25 Hydroxy    Colon cancer screening  Comments:  Colonoscopy referral made.  Orders:  -     Ambulatory Referral For Screening Colonoscopy          Patient's Body mass index is 22.5 kg/m². BMI is within normal parameters. No follow-up required..             Non-Smoker    MyChart Instructions Given       "

## 2019-12-07 LAB
25(OH)D3+25(OH)D2 SERPL-MCNC: 58.8 NG/ML (ref 30–100)
ALBUMIN SERPL-MCNC: 4.6 G/DL (ref 3.5–5.2)
ALBUMIN/GLOB SERPL: 1.9 G/DL
ALP SERPL-CCNC: 73 U/L (ref 39–117)
ALT SERPL-CCNC: 14 U/L (ref 1–33)
AST SERPL-CCNC: 15 U/L (ref 1–32)
BASOPHILS # BLD AUTO: 0.04 10*3/MM3 (ref 0–0.2)
BASOPHILS NFR BLD AUTO: 1 % (ref 0–1.5)
BILIRUB SERPL-MCNC: 0.2 MG/DL (ref 0.2–1.2)
BUN SERPL-MCNC: 9 MG/DL (ref 6–20)
BUN/CREAT SERPL: 12.7 (ref 7–25)
CALCIUM SERPL-MCNC: 9.5 MG/DL (ref 8.6–10.5)
CHLORIDE SERPL-SCNC: 103 MMOL/L (ref 98–107)
CHOLEST SERPL-MCNC: 233 MG/DL (ref 0–200)
CO2 SERPL-SCNC: 30.1 MMOL/L (ref 22–29)
CREAT SERPL-MCNC: 0.71 MG/DL (ref 0.57–1)
EOSINOPHIL # BLD AUTO: 0.19 10*3/MM3 (ref 0–0.4)
EOSINOPHIL NFR BLD AUTO: 4.9 % (ref 0.3–6.2)
ERYTHROCYTE [DISTWIDTH] IN BLOOD BY AUTOMATED COUNT: 12.9 % (ref 12.3–15.4)
GLOBULIN SER CALC-MCNC: 2.4 GM/DL
GLUCOSE SERPL-MCNC: 89 MG/DL (ref 65–99)
HBA1C MFR BLD: 5.7 % (ref 4.8–5.6)
HCT VFR BLD AUTO: 36.3 % (ref 34–46.6)
HDLC SERPL-MCNC: 81 MG/DL (ref 40–60)
HGB BLD-MCNC: 12.2 G/DL (ref 12–15.9)
IMM GRANULOCYTES # BLD AUTO: 0.01 10*3/MM3 (ref 0–0.05)
IMM GRANULOCYTES NFR BLD AUTO: 0.3 % (ref 0–0.5)
LDLC SERPL CALC-MCNC: 115 MG/DL (ref 0–100)
LDLC/HDLC SERPL: 1.42 {RATIO}
LYMPHOCYTES # BLD AUTO: 1.58 10*3/MM3 (ref 0.7–3.1)
LYMPHOCYTES NFR BLD AUTO: 40.6 % (ref 19.6–45.3)
MCH RBC QN AUTO: 31.2 PG (ref 26.6–33)
MCHC RBC AUTO-ENTMCNC: 33.6 G/DL (ref 31.5–35.7)
MCV RBC AUTO: 92.8 FL (ref 79–97)
MONOCYTES # BLD AUTO: 0.29 10*3/MM3 (ref 0.1–0.9)
MONOCYTES NFR BLD AUTO: 7.5 % (ref 5–12)
NEUTROPHILS # BLD AUTO: 1.78 10*3/MM3 (ref 1.7–7)
NEUTROPHILS NFR BLD AUTO: 45.7 % (ref 42.7–76)
NRBC BLD AUTO-RTO: 0.3 /100 WBC (ref 0–0.2)
PLATELET # BLD AUTO: 254 10*3/MM3 (ref 140–450)
POTASSIUM SERPL-SCNC: 5 MMOL/L (ref 3.5–5.2)
PROT SERPL-MCNC: 7 G/DL (ref 6–8.5)
RBC # BLD AUTO: 3.91 10*6/MM3 (ref 3.77–5.28)
SODIUM SERPL-SCNC: 144 MMOL/L (ref 136–145)
T3RU NFR SERPL: 24 % (ref 24–39)
T4 FREE SERPL-MCNC: 1.31 NG/DL (ref 0.93–1.7)
TRIGL SERPL-MCNC: 183 MG/DL (ref 0–150)
TSH SERPL DL<=0.005 MIU/L-ACNC: 0.64 UIU/ML (ref 0.27–4.2)
VLDLC SERPL CALC-MCNC: 36.6 MG/DL
WBC # BLD AUTO: 3.89 10*3/MM3 (ref 3.4–10.8)

## 2019-12-09 ENCOUNTER — TELEPHONE (OUTPATIENT)
Dept: OBSTETRICS AND GYNECOLOGY | Facility: CLINIC | Age: 49
End: 2019-12-09

## 2019-12-09 ENCOUNTER — PATIENT MESSAGE (OUTPATIENT)
Dept: OBSTETRICS AND GYNECOLOGY | Facility: CLINIC | Age: 49
End: 2019-12-09

## 2019-12-09 NOTE — TELEPHONE ENCOUNTER
----- Message from Heidi Fernandez sent at 12/9/2019  3:58 PM CST -----  Regarding: Non-Urgent Medical Question  Contact: [phone]  I have a question about CBC WITH DIFFERENTIAL resulted on 12/7/19, 6:08 AM.    Just wondering if this is of any concern??? It's out of the normal range...

## 2019-12-17 ENCOUNTER — HOSPITAL ENCOUNTER (OUTPATIENT)
Dept: MAMMOGRAPHY | Facility: HOSPITAL | Age: 49
Discharge: HOME | End: 2019-12-17
Admitting: NURSE PRACTITIONER

## 2019-12-17 DIAGNOSIS — R89.9 ABNORMAL LABORATORY TEST: ICD-10-CM

## 2019-12-17 DIAGNOSIS — E78.49 OTHER HYPERLIPIDEMIA: Primary | ICD-10-CM

## 2019-12-17 PROCEDURE — 77067 SCR MAMMO BI INCL CAD: CPT

## 2019-12-17 PROCEDURE — 77063 BREAST TOMOSYNTHESIS BI: CPT

## 2019-12-18 LAB
CHOLEST SERPL-MCNC: 229 MG/DL (ref 0–200)
HBA1C MFR BLD: 5.7 % (ref 4.8–5.6)
HDLC SERPL-MCNC: 83 MG/DL (ref 40–60)
LDLC SERPL CALC-MCNC: 111 MG/DL (ref 0–100)
LDLC/HDLC SERPL: 1.34 {RATIO}
TRIGL SERPL-MCNC: 175 MG/DL (ref 0–150)
VLDLC SERPL CALC-MCNC: 35 MG/DL

## 2019-12-18 NOTE — PROGRESS NOTES
Chief Complaint   Patient presents with   • Colon Cancer Screening     Pt presents today for evaluation for screening colonoscopy     Subjective   HPI  Heidi Fernandez is a 49 y.o. female who presents as a referral for preventative maintenance. She has no complaints of nausea or vomiting. No change in bowels. No wt loss. No BRBPR. No melena. There is no family hx for colon cancer. No abdominal pain. There was no Cologuard screening this year.  The patient has not had a colonoscopy in the past.  Past Medical History:   Diagnosis Date   • Anemia    • Cyst, ovary, follicular    • Hypothyroid    • Lichen sclerosus      Past Surgical History:   Procedure Laterality Date   • BILATERAL SALPINGO OOPHORECTOMY     • BREAST BIOPSY Right    • DILATATION AND CURETTAGE  09/13/2012   • HYSTERECTOMY      TLH w/ BSO due to bleeding issues   • LAPAROSCOPY WITH LASER     • OTHER SURGICAL HISTORY  10/29/2012    TLH W/T/O 250 G OR LESS: Lap Hyst and BSO       Current Outpatient Medications:   •  amitriptyline (ELAVIL) 150 MG tablet, Take 150 mg by mouth every night at bedtime., Disp: , Rfl: 0  •  B Complex Vitamins (VITAMIN B COMPLEX PO), Take 1 tablet by mouth Daily., Disp: , Rfl:   •  clobetasol (TEMOVATE) 0.05 % cream, Apply 1 application topically to the appropriate area as directed 2 (Two) Times a Day. (Patient taking differently: Apply 1 application topically to the appropriate area as directed As Needed.), Disp: 45 g, Rfl: 6  •  estrogens, conjugated, (PREMARIN) 0.3 MG tablet, Take 1 tablet by mouth Daily., Disp: 90 tablet, Rfl: 3  •  levothyroxine (SYNTHROID, LEVOTHROID) 112 MCG tablet, Take 1 tablet by mouth Daily., Disp: 30 tablet, Rfl: 0  •  magnesium oxide (MAGOX) 400 (241.3 Mg) MG tablet tablet, Take 400 mg by mouth 2 (Two) Times a Day., Disp: , Rfl:   •  Omega-3 Fatty Acids (FISH OIL) 1200 MG capsule delayed-release, Take 1 tablet by mouth Daily., Disp: , Rfl:   •  verapamil (CALAN) 80 MG tablet, Take 80 mg by mouth daily.,  Disp: , Rfl:   •  sodium-potassium-magnesium sulfates (SUPREP BOWEL PREP KIT) 17.5-3.13-1.6 GM/177ML solution oral solution, Take 1 bottle by mouth Every 12 (Twelve) Hours. Split dose prep as directed by office instructions provided.  2 bottles = one kit., Disp: 2 bottle, Rfl: 0  No Known Allergies  Social History     Socioeconomic History   • Marital status:      Spouse name: Not on file   • Number of children: Not on file   • Years of education: Not on file   • Highest education level: Not on file   Tobacco Use   • Smoking status: Never Smoker   • Smokeless tobacco: Never Used   Substance and Sexual Activity   • Alcohol use: No   • Drug use: No   • Sexual activity: Yes     Partners: Male     Birth control/protection: Post-menopausal     Family History   Problem Relation Age of Onset   • Hypertension Mother    • Other Mother         cerebrovascular accident   • No Known Problems Father    • No Known Problems Sister    • No Known Problems Brother    • Breast cancer Maternal Aunt 64   • Breast cancer Cousin 45   • No Known Problems Daughter    • No Known Problems Son    • No Known Problems Maternal Grandmother    • No Known Problems Paternal Grandmother    • No Known Problems Paternal Aunt    • Ovarian cancer Neg Hx    • Uterine cancer Neg Hx    • Colon cancer Neg Hx    • Melanoma Neg Hx    • Prostate cancer Neg Hx    • BRCA 1/2 Neg Hx    • Endometrial cancer Neg Hx    • Colon polyps Neg Hx    • Esophageal cancer Neg Hx    • Liver cancer Neg Hx    • Liver disease Neg Hx    • Rectal cancer Neg Hx    • Stomach cancer Neg Hx        REVIEW OF SYSTEMS  General: well appearing, no fever chills or sweats, no unexplained wt loss  HEENT: no acute visual or hearing disturbances  Cardiovascular: No chest pain or palpitations  Pulmonary: No shortness of breath, coughing, wheezing or hemoptysis  : No burning, urgency, hematuria, or dysuria  Musculoskeletal: No joint pain or stiffness  Peripheral: no edema  Skin: No  "lesions or rashes  Neuro: No dizziness, headaches, stroke, syncope  Endocrine: No hot or cold intolerances  Hematological: No blood dyscrasias    Objective   Vitals:    12/19/19 0922   BP: 104/68   BP Location: Left arm   Patient Position: Sitting   Cuff Size: Adult   Pulse: 58   Temp: 97.6 °F (36.4 °C)   TempSrc: Tympanic   SpO2: 99%   Weight: 67.6 kg (149 lb)   Height: 170.2 cm (67\")     Body mass index is 23.34 kg/m².    PHYSICAL EXAM  General: age appropriate well nourished well appearing, no acute distress  Head: normocephalic and atraumatic  Global assessment-supple  Neck-No JVD noted, no lymphadenopathy  Pulmonary-clear to auscultation bilaterally, normal respiratory effort  Cardiovascular-normal rate and rhythm, normal heart sounds, S1 and S2 noted  Abdomen-soft, non tender, non distended, normal bowel sounds all 4 quadrants, no hepatosplenomegaly noted  Extremities-No clubbing cyanosis or edema  Neuro-Non focal, converses appropriately, awake, alert, oriented    Imaging Results (Most Recent)     None        Assessment/Plan   Heidi was seen today for colon cancer screening.    Diagnoses and all orders for this visit:    Colon cancer screening  -     Case Request; Standing  -     Case Request    Other orders  -     Follow Anesthesia Guidelines / Standing Orders; Future  -     Obtain Informed Consent; Future  -     sodium-potassium-magnesium sulfates (SUPREP BOWEL PREP KIT) 17.5-3.13-1.6 GM/177ML solution oral solution; Take 1 bottle by mouth Every 12 (Twelve) Hours. Split dose prep as directed by office instructions provided.  2 bottles = one kit.      COLONOSCOPY WITH ANESTHESIA (N/A)       Body mass index is 23.34 kg/m². Patient's Body mass index is 23.34 kg/m². BMI is within normal parameters. No follow-up required..      All risks, benefits, alternatives, and indications of colonoscopy procedure have been discussed with the patient. Risks to include perforation of the colon requiring possible surgery or " colostomy, risk of bleeding from biopsies or removal of colon tissue, possibility of missing a colon polyp or cancer, or adverse drug reaction.  Benefits to include the diagnosis and management of disease of the colon and rectum. Alternatives to include barium enema, radiographic evaluation, lab testing or no intervention. Pt verbalizes understanding and agrees.

## 2019-12-19 ENCOUNTER — OFFICE VISIT (OUTPATIENT)
Dept: GASTROENTEROLOGY | Facility: CLINIC | Age: 49
End: 2019-12-19

## 2019-12-19 VITALS
OXYGEN SATURATION: 99 % | HEART RATE: 58 BPM | BODY MASS INDEX: 23.39 KG/M2 | DIASTOLIC BLOOD PRESSURE: 68 MMHG | WEIGHT: 149 LBS | HEIGHT: 67 IN | TEMPERATURE: 97.6 F | SYSTOLIC BLOOD PRESSURE: 104 MMHG

## 2019-12-19 DIAGNOSIS — Z12.11 COLON CANCER SCREENING: Primary | ICD-10-CM

## 2019-12-19 PROCEDURE — S0285 CNSLT BEFORE SCREEN COLONOSC: HCPCS | Performed by: NURSE PRACTITIONER

## 2019-12-19 RX ORDER — SODIUM, POTASSIUM,MAG SULFATES 17.5-3.13G
1 SOLUTION, RECONSTITUTED, ORAL ORAL EVERY 12 HOURS
Qty: 2 BOTTLE | Refills: 0 | Status: ON HOLD | OUTPATIENT
Start: 2019-12-19 | End: 2020-01-08

## 2019-12-22 DIAGNOSIS — N95.1 MENOPAUSAL SYMPTOMS: ICD-10-CM

## 2020-01-08 ENCOUNTER — ANESTHESIA EVENT (OUTPATIENT)
Dept: GASTROENTEROLOGY | Facility: HOSPITAL | Age: 50
End: 2020-01-08

## 2020-01-08 ENCOUNTER — TELEPHONE (OUTPATIENT)
Dept: GASTROENTEROLOGY | Facility: CLINIC | Age: 50
End: 2020-01-08

## 2020-01-08 ENCOUNTER — HOSPITAL ENCOUNTER (OUTPATIENT)
Facility: HOSPITAL | Age: 50
Setting detail: HOSPITAL OUTPATIENT SURGERY
Discharge: HOME | End: 2020-01-08
Attending: INTERNAL MEDICINE | Admitting: INTERNAL MEDICINE

## 2020-01-08 ENCOUNTER — ANESTHESIA (OUTPATIENT)
Dept: GASTROENTEROLOGY | Facility: HOSPITAL | Age: 50
End: 2020-01-08

## 2020-01-08 VITALS
TEMPERATURE: 97.2 F | RESPIRATION RATE: 15 BRPM | OXYGEN SATURATION: 98 % | WEIGHT: 154 LBS | HEART RATE: 85 BPM | SYSTOLIC BLOOD PRESSURE: 105 MMHG | DIASTOLIC BLOOD PRESSURE: 68 MMHG | HEIGHT: 68 IN | BODY MASS INDEX: 23.34 KG/M2

## 2020-01-08 PROCEDURE — 25010000002 PROPOFOL 10 MG/ML EMULSION: Performed by: NURSE ANESTHETIST, CERTIFIED REGISTERED

## 2020-01-08 PROCEDURE — G0121 COLON CA SCRN NOT HI RSK IND: HCPCS | Performed by: INTERNAL MEDICINE

## 2020-01-08 RX ORDER — PROPOFOL 10 MG/ML
VIAL (ML) INTRAVENOUS AS NEEDED
Status: DISCONTINUED | OUTPATIENT
Start: 2020-01-08 | End: 2020-01-08 | Stop reason: SURG

## 2020-01-08 RX ORDER — SODIUM CHLORIDE 0.9 % (FLUSH) 0.9 %
10 SYRINGE (ML) INJECTION AS NEEDED
Status: DISCONTINUED | OUTPATIENT
Start: 2020-01-08 | End: 2020-01-08 | Stop reason: HOSPADM

## 2020-01-08 RX ORDER — SODIUM CHLORIDE 9 MG/ML
500 INJECTION, SOLUTION INTRAVENOUS CONTINUOUS PRN
Status: DISCONTINUED | OUTPATIENT
Start: 2020-01-08 | End: 2020-01-08 | Stop reason: HOSPADM

## 2020-01-08 RX ADMIN — PROPOFOL 30 MG: 10 INJECTION, EMULSION INTRAVENOUS at 09:51

## 2020-01-08 RX ADMIN — LIDOCAINE HYDROCHLORIDE 80 MG: 20 INJECTION, SOLUTION INTRAVENOUS at 09:40

## 2020-01-08 RX ADMIN — SODIUM CHLORIDE 500 ML: 9 INJECTION, SOLUTION INTRAVENOUS at 09:12

## 2020-01-08 RX ADMIN — PROPOFOL 30 MG: 10 INJECTION, EMULSION INTRAVENOUS at 09:49

## 2020-01-08 RX ADMIN — PROPOFOL 70 MG: 10 INJECTION, EMULSION INTRAVENOUS at 09:40

## 2020-01-08 RX ADMIN — PROPOFOL 40 MG: 10 INJECTION, EMULSION INTRAVENOUS at 09:42

## 2020-01-08 RX ADMIN — PROPOFOL 30 MG: 10 INJECTION, EMULSION INTRAVENOUS at 09:54

## 2020-01-08 RX ADMIN — PROPOFOL 30 MG: 10 INJECTION, EMULSION INTRAVENOUS at 09:57

## 2020-01-08 RX ADMIN — PROPOFOL 30 MG: 10 INJECTION, EMULSION INTRAVENOUS at 09:59

## 2020-01-08 RX ADMIN — PROPOFOL 40 MG: 10 INJECTION, EMULSION INTRAVENOUS at 09:45

## 2020-01-08 NOTE — ANESTHESIA POSTPROCEDURE EVALUATION
Patient: Heidi Fernandez    Procedure Summary     Date:  01/08/20 Room / Location:  [facility] ENDOSCOPY 4 / [facility]    Anesthesia Start:  0931 Anesthesia Stop:  1004    Procedure:  COLONOSCOPY WITH ANESTHESIA (N/A ) Diagnosis:       Colon cancer screening      (Colon cancer screening [Z12.11])    Surgeon:  Josefina Almonte MD Provider:  Toan Don CRNA    Anesthesia Type:  MAC ASA Status:  2          Anesthesia Type: MAC    Vitals  Vitals Value Taken Time   BP 98/59 1/8/2020 10:05 AM   Temp     Pulse 77 1/8/2020 10:06 AM   Resp 16 1/8/2020 10:05 AM   SpO2 100 % 1/8/2020 10:06 AM   Vitals shown include unvalidated device data.        Post Anesthesia Care and Evaluation    Patient location during evaluation: PHASE II  Patient participation: complete - patient participated  Level of consciousness: awake  Pain score: 0  Pain management: adequate  Airway patency: patent  Anesthetic complications: No anesthetic complications  PONV Status: none  Cardiovascular status: acceptable  Respiratory status: acceptable  Hydration status: acceptable

## 2020-01-08 NOTE — ANESTHESIA PREPROCEDURE EVALUATION
Anesthesia Evaluation     Patient summary reviewed   no history of anesthetic complications:  NPO Solid Status: > 8 hours             Airway   Mallampati: II  TM distance: >3 FB  Neck ROM: full  Dental      Pulmonary - negative pulmonary ROS   Cardiovascular   Exercise tolerance: excellent (>7 METS)    (+) hypertension,       Neuro/Psych- negative ROS  GI/Hepatic/Renal/Endo    (+)   thyroid problem hypothyroidism    Musculoskeletal     Abdominal    Substance History      OB/GYN          Other                        Anesthesia Plan    ASA 2     MAC       Anesthetic plan, all risks, benefits, and alternatives have been provided, discussed and informed consent has been obtained with: patient.

## 2020-08-30 ENCOUNTER — HOSPITAL ENCOUNTER (INPATIENT)
Facility: HOSPITAL | Age: 50
LOS: 8 days | Discharge: HOME | End: 2020-09-07
Attending: INTERNAL MEDICINE | Admitting: FAMILY MEDICINE

## 2020-08-30 DIAGNOSIS — J90 PLEURAL EFFUSION ON RIGHT: Primary | ICD-10-CM

## 2020-08-30 DIAGNOSIS — J86.9 EMPYEMA, RIGHT (HCC): ICD-10-CM

## 2020-08-31 ENCOUNTER — APPOINTMENT (OUTPATIENT)
Dept: ULTRASOUND IMAGING | Facility: HOSPITAL | Age: 50
End: 2020-08-31

## 2020-08-31 ENCOUNTER — APPOINTMENT (OUTPATIENT)
Dept: GENERAL RADIOLOGY | Facility: HOSPITAL | Age: 50
End: 2020-08-31

## 2020-08-31 PROBLEM — K85.90 ACUTE PANCREATITIS: Status: ACTIVE | Noted: 2020-08-31

## 2020-08-31 LAB
ALBUMIN SERPL-MCNC: 2.4 G/DL (ref 3.5–5.2)
ALBUMIN/GLOB SERPL: 0.7 G/DL
ALP SERPL-CCNC: 357 U/L (ref 39–117)
ALT SERPL W P-5'-P-CCNC: 104 U/L (ref 1–33)
ANION GAP SERPL CALCULATED.3IONS-SCNC: 9 MMOL/L (ref 5–15)
AST SERPL-CCNC: 59 U/L (ref 1–32)
B PARAPERT DNA SPEC QL NAA+PROBE: NOT DETECTED
B PERT DNA SPEC QL NAA+PROBE: NOT DETECTED
BILIRUB SERPL-MCNC: 0.3 MG/DL (ref 0–1.2)
BUN SERPL-MCNC: 13 MG/DL (ref 6–20)
BUN/CREAT SERPL: 23.6 (ref 7–25)
BURR CELLS BLD QL SMEAR: ABNORMAL
C PNEUM DNA NPH QL NAA+NON-PROBE: NOT DETECTED
CALCIUM SPEC-SCNC: 8.2 MG/DL (ref 8.6–10.5)
CHLORIDE SERPL-SCNC: 99 MMOL/L (ref 98–107)
CLUMPED PLATELETS: PRESENT
CO2 SERPL-SCNC: 25 MMOL/L (ref 22–29)
CREAT SERPL-MCNC: 0.55 MG/DL (ref 0.57–1)
DEPRECATED RDW RBC AUTO: 46 FL (ref 37–54)
ERYTHROCYTE [DISTWIDTH] IN BLOOD BY AUTOMATED COUNT: 13.9 % (ref 12.3–15.4)
FLUAV H1 2009 PAND RNA NPH QL NAA+PROBE: NOT DETECTED
FLUAV H1 HA GENE NPH QL NAA+PROBE: NOT DETECTED
FLUAV H3 RNA NPH QL NAA+PROBE: NOT DETECTED
FLUAV SUBTYP SPEC NAA+PROBE: NOT DETECTED
FLUBV RNA ISLT QL NAA+PROBE: NOT DETECTED
GFR SERPL CREATININE-BSD FRML MDRD: 117 ML/MIN/1.73
GLOBULIN UR ELPH-MCNC: 3.3 GM/DL
GLUCOSE SERPL-MCNC: 110 MG/DL (ref 65–99)
HADV DNA SPEC NAA+PROBE: NOT DETECTED
HCOV 229E RNA SPEC QL NAA+PROBE: NOT DETECTED
HCOV HKU1 RNA SPEC QL NAA+PROBE: NOT DETECTED
HCOV NL63 RNA SPEC QL NAA+PROBE: NOT DETECTED
HCOV OC43 RNA SPEC QL NAA+PROBE: NOT DETECTED
HCT VFR BLD AUTO: 29.8 % (ref 34–46.6)
HGB BLD-MCNC: 9.9 G/DL (ref 12–15.9)
HMPV RNA NPH QL NAA+NON-PROBE: NOT DETECTED
HOLD SPECIMEN: NORMAL
HPIV1 RNA SPEC QL NAA+PROBE: NOT DETECTED
HPIV2 RNA SPEC QL NAA+PROBE: NOT DETECTED
HPIV3 RNA NPH QL NAA+PROBE: NOT DETECTED
HPIV4 P GENE NPH QL NAA+PROBE: NOT DETECTED
LIPASE SERPL-CCNC: 795 U/L (ref 13–60)
LYMPHOCYTES # BLD MANUAL: 0.89 10*3/MM3 (ref 0.7–3.1)
LYMPHOCYTES NFR BLD MANUAL: 3 % (ref 19.6–45.3)
LYMPHOCYTES NFR BLD MANUAL: 3 % (ref 5–12)
M PNEUMO IGG SER IA-ACNC: NOT DETECTED
MCH RBC QN AUTO: 29.8 PG (ref 26.6–33)
MCHC RBC AUTO-ENTMCNC: 33.2 G/DL (ref 31.5–35.7)
MCV RBC AUTO: 89.8 FL (ref 79–97)
MONOCYTES # BLD AUTO: 0.89 10*3/MM3 (ref 0.1–0.9)
NEUTROPHILS # BLD AUTO: 27.77 10*3/MM3 (ref 1.7–7)
NEUTROPHILS NFR BLD MANUAL: 94 % (ref 42.7–76)
PLATELET # BLD AUTO: 570 10*3/MM3 (ref 140–450)
PMV BLD AUTO: 8.3 FL (ref 6–12)
POLYCHROMASIA BLD QL SMEAR: ABNORMAL
POTASSIUM SERPL-SCNC: 4.7 MMOL/L (ref 3.5–5.2)
PROT SERPL-MCNC: 5.7 G/DL (ref 6–8.5)
RBC # BLD AUTO: 3.32 10*6/MM3 (ref 3.77–5.28)
RHINOVIRUS RNA SPEC NAA+PROBE: NOT DETECTED
RSV RNA NPH QL NAA+NON-PROBE: NOT DETECTED
SARS-COV-2 RDRP RESP QL NAA+PROBE: NOT DETECTED
SARS-COV-2 RNA NPH QL NAA+NON-PROBE: NOT DETECTED
SMALL PLATELETS BLD QL SMEAR: ABNORMAL
SODIUM SERPL-SCNC: 133 MMOL/L (ref 136–145)
WBC # BLD AUTO: 29.54 10*3/MM3 (ref 3.4–10.8)
WBC MORPH BLD: NORMAL
WHOLE BLOOD HOLD SPECIMEN: NORMAL

## 2020-08-31 PROCEDURE — 87635 SARS-COV-2 COVID-19 AMP PRB: CPT | Performed by: INTERNAL MEDICINE

## 2020-08-31 PROCEDURE — 71045 X-RAY EXAM CHEST 1 VIEW: CPT

## 2020-08-31 PROCEDURE — 25010000002 PIPERACILLIN SOD-TAZOBACTAM PER 1 G: Performed by: INTERNAL MEDICINE

## 2020-08-31 PROCEDURE — 99222 1ST HOSP IP/OBS MODERATE 55: CPT | Performed by: NURSE PRACTITIONER

## 2020-08-31 PROCEDURE — 25010000002 HYDROMORPHONE PER 4 MG: Performed by: INTERNAL MEDICINE

## 2020-08-31 PROCEDURE — 0202U NFCT DS 22 TRGT SARS-COV-2: CPT | Performed by: FAMILY MEDICINE

## 2020-08-31 PROCEDURE — 80053 COMPREHEN METABOLIC PANEL: CPT | Performed by: INTERNAL MEDICINE

## 2020-08-31 PROCEDURE — 83690 ASSAY OF LIPASE: CPT | Performed by: INTERNAL MEDICINE

## 2020-08-31 PROCEDURE — 85025 COMPLETE CBC W/AUTO DIFF WBC: CPT | Performed by: INTERNAL MEDICINE

## 2020-08-31 PROCEDURE — 76705 ECHO EXAM OF ABDOMEN: CPT

## 2020-08-31 PROCEDURE — 87040 BLOOD CULTURE FOR BACTERIA: CPT | Performed by: FAMILY MEDICINE

## 2020-08-31 PROCEDURE — 85007 BL SMEAR W/DIFF WBC COUNT: CPT | Performed by: INTERNAL MEDICINE

## 2020-08-31 RX ORDER — SODIUM CHLORIDE 9 MG/ML
100 INJECTION, SOLUTION INTRAVENOUS CONTINUOUS
Status: DISCONTINUED | OUTPATIENT
Start: 2020-08-31 | End: 2020-09-01

## 2020-08-31 RX ORDER — HYDROMORPHONE HYDROCHLORIDE 1 MG/ML
0.5 INJECTION, SOLUTION INTRAMUSCULAR; INTRAVENOUS; SUBCUTANEOUS
Status: DISCONTINUED | OUTPATIENT
Start: 2020-08-31 | End: 2020-09-02

## 2020-08-31 RX ORDER — BENZONATATE 100 MG/1
100 CAPSULE ORAL 3 TIMES DAILY PRN
Status: DISCONTINUED | OUTPATIENT
Start: 2020-08-31 | End: 2020-09-07 | Stop reason: HOSPADM

## 2020-08-31 RX ORDER — FAMOTIDINE 10 MG/ML
20 INJECTION, SOLUTION INTRAVENOUS 2 TIMES DAILY
Status: DISCONTINUED | OUTPATIENT
Start: 2020-08-31 | End: 2020-09-03

## 2020-08-31 RX ORDER — OXYCODONE HYDROCHLORIDE AND ACETAMINOPHEN 5; 325 MG/1; MG/1
1 TABLET ORAL EVERY 6 HOURS PRN
Status: DISCONTINUED | OUTPATIENT
Start: 2020-08-31 | End: 2020-09-02

## 2020-08-31 RX ORDER — ACETAMINOPHEN 325 MG/1
650 TABLET ORAL EVERY 4 HOURS PRN
Status: DISCONTINUED | OUTPATIENT
Start: 2020-08-31 | End: 2020-09-07 | Stop reason: HOSPADM

## 2020-08-31 RX ORDER — CYCLOBENZAPRINE HCL 5 MG
5 TABLET ORAL 3 TIMES DAILY PRN
Status: DISCONTINUED | OUTPATIENT
Start: 2020-08-31 | End: 2020-09-07 | Stop reason: HOSPADM

## 2020-08-31 RX ORDER — SODIUM CHLORIDE 0.9 % (FLUSH) 0.9 %
10 SYRINGE (ML) INJECTION AS NEEDED
Status: DISCONTINUED | OUTPATIENT
Start: 2020-08-31 | End: 2020-09-07 | Stop reason: HOSPADM

## 2020-08-31 RX ORDER — SODIUM CHLORIDE 0.9 % (FLUSH) 0.9 %
10 SYRINGE (ML) INJECTION EVERY 12 HOURS SCHEDULED
Status: DISCONTINUED | OUTPATIENT
Start: 2020-08-31 | End: 2020-09-07 | Stop reason: HOSPADM

## 2020-08-31 RX ORDER — LEVOTHYROXINE SODIUM 112 UG/1
112 TABLET ORAL
Status: DISCONTINUED | OUTPATIENT
Start: 2020-08-31 | End: 2020-09-07 | Stop reason: HOSPADM

## 2020-08-31 RX ORDER — ONDANSETRON 2 MG/ML
4 INJECTION INTRAMUSCULAR; INTRAVENOUS EVERY 6 HOURS PRN
Status: DISCONTINUED | OUTPATIENT
Start: 2020-08-31 | End: 2020-09-07 | Stop reason: HOSPADM

## 2020-08-31 RX ORDER — AMITRIPTYLINE HYDROCHLORIDE 75 MG/1
150 TABLET, FILM COATED ORAL NIGHTLY
Status: DISCONTINUED | OUTPATIENT
Start: 2020-08-31 | End: 2020-09-07 | Stop reason: HOSPADM

## 2020-08-31 RX ORDER — ACETAMINOPHEN 650 MG/1
650 SUPPOSITORY RECTAL EVERY 4 HOURS PRN
Status: DISCONTINUED | OUTPATIENT
Start: 2020-08-31 | End: 2020-09-07 | Stop reason: HOSPADM

## 2020-08-31 RX ORDER — VERAPAMIL HYDROCHLORIDE 80 MG/1
80 TABLET ORAL DAILY
Status: DISCONTINUED | OUTPATIENT
Start: 2020-08-31 | End: 2020-09-01

## 2020-08-31 RX ADMIN — OXYCODONE HYDROCHLORIDE AND ACETAMINOPHEN 1 TABLET: 5; 325 TABLET ORAL at 15:19

## 2020-08-31 RX ADMIN — SODIUM CHLORIDE, PRESERVATIVE FREE 10 ML: 5 INJECTION INTRAVENOUS at 21:24

## 2020-08-31 RX ADMIN — SODIUM CHLORIDE 100 ML/HR: 9 INJECTION, SOLUTION INTRAVENOUS at 17:27

## 2020-08-31 RX ADMIN — FAMOTIDINE 20 MG: 10 INJECTION INTRAVENOUS at 21:23

## 2020-08-31 RX ADMIN — SODIUM CHLORIDE, PRESERVATIVE FREE 10 ML: 5 INJECTION INTRAVENOUS at 01:34

## 2020-08-31 RX ADMIN — AMITRIPTYLINE HYDROCHLORIDE 150 MG: 75 TABLET, FILM COATED ORAL at 21:23

## 2020-08-31 RX ADMIN — FAMOTIDINE 20 MG: 10 INJECTION INTRAVENOUS at 09:51

## 2020-08-31 RX ADMIN — SODIUM CHLORIDE 100 ML/HR: 9 INJECTION, SOLUTION INTRAVENOUS at 01:27

## 2020-08-31 RX ADMIN — SODIUM CHLORIDE, PRESERVATIVE FREE 10 ML: 5 INJECTION INTRAVENOUS at 09:52

## 2020-08-31 RX ADMIN — ESTROGENS, CONJUGATED 0.3 MG: 0.3 TABLET, FILM COATED ORAL at 08:42

## 2020-08-31 RX ADMIN — HYDROMORPHONE HYDROCHLORIDE 0.5 MG: 1 INJECTION, SOLUTION INTRAMUSCULAR; INTRAVENOUS; SUBCUTANEOUS at 17:43

## 2020-08-31 RX ADMIN — DOXYCYCLINE 100 MG: 100 INJECTION, POWDER, LYOPHILIZED, FOR SOLUTION INTRAVENOUS at 09:51

## 2020-08-31 RX ADMIN — TAZOBACTAM SODIUM AND PIPERACILLIN SODIUM 3.38 G: 375; 3 INJECTION, SOLUTION INTRAVENOUS at 07:49

## 2020-08-31 RX ADMIN — HYDROMORPHONE HYDROCHLORIDE 0.5 MG: 1 INJECTION, SOLUTION INTRAMUSCULAR; INTRAVENOUS; SUBCUTANEOUS at 01:41

## 2020-08-31 RX ADMIN — BENZONATATE 100 MG: 100 CAPSULE ORAL at 23:18

## 2020-08-31 RX ADMIN — TAZOBACTAM SODIUM AND PIPERACILLIN SODIUM 3.38 G: 375; 3 INJECTION, SOLUTION INTRAVENOUS at 01:27

## 2020-08-31 RX ADMIN — HYDROMORPHONE HYDROCHLORIDE 0.5 MG: 1 INJECTION, SOLUTION INTRAMUSCULAR; INTRAVENOUS; SUBCUTANEOUS at 07:49

## 2020-08-31 RX ADMIN — ACETAMINOPHEN 650 MG: 325 TABLET, FILM COATED ORAL at 23:07

## 2020-08-31 RX ADMIN — DOXYCYCLINE 100 MG: 100 INJECTION, POWDER, LYOPHILIZED, FOR SOLUTION INTRAVENOUS at 21:22

## 2020-08-31 RX ADMIN — TAZOBACTAM SODIUM AND PIPERACILLIN SODIUM 3.38 G: 375; 3 INJECTION, SOLUTION INTRAVENOUS at 23:07

## 2020-08-31 RX ADMIN — HYDROMORPHONE HYDROCHLORIDE 0.5 MG: 1 INJECTION, SOLUTION INTRAMUSCULAR; INTRAVENOUS; SUBCUTANEOUS at 21:40

## 2020-08-31 RX ADMIN — VERAPAMIL HYDROCHLORIDE 80 MG: 80 TABLET, FILM COATED ORAL at 08:42

## 2020-08-31 RX ADMIN — LEVOTHYROXINE SODIUM 112 MCG: 112 TABLET ORAL at 05:01

## 2020-08-31 RX ADMIN — TAZOBACTAM SODIUM AND PIPERACILLIN SODIUM 3.38 G: 375; 3 INJECTION, SOLUTION INTRAVENOUS at 17:27

## 2020-08-31 RX ADMIN — HYDROMORPHONE HYDROCHLORIDE 0.5 MG: 1 INJECTION, SOLUTION INTRAMUSCULAR; INTRAVENOUS; SUBCUTANEOUS at 05:07

## 2020-08-31 RX ADMIN — BENZONATATE 100 MG: 100 CAPSULE ORAL at 01:41

## 2020-08-31 RX ADMIN — ACETAMINOPHEN 650 MG: 650 SUPPOSITORY RECTAL at 08:42

## 2020-08-31 RX ADMIN — OXYCODONE HYDROCHLORIDE AND ACETAMINOPHEN 1 TABLET: 5; 325 TABLET ORAL at 09:53

## 2020-08-31 RX ADMIN — HYDROCODONE POLISTIREX AND CHLORPHENIRAMINE POLISTIREX 5 ML: 10; 8 SUSPENSION, EXTENDED RELEASE ORAL at 18:21

## 2020-09-01 ENCOUNTER — APPOINTMENT (OUTPATIENT)
Dept: GENERAL RADIOLOGY | Facility: HOSPITAL | Age: 50
End: 2020-09-01

## 2020-09-01 ENCOUNTER — APPOINTMENT (OUTPATIENT)
Dept: CT IMAGING | Facility: HOSPITAL | Age: 50
End: 2020-09-01

## 2020-09-01 ENCOUNTER — APPOINTMENT (OUTPATIENT)
Dept: CARDIOLOGY | Facility: HOSPITAL | Age: 50
End: 2020-09-01

## 2020-09-01 ENCOUNTER — APPOINTMENT (OUTPATIENT)
Dept: ULTRASOUND IMAGING | Facility: HOSPITAL | Age: 50
End: 2020-09-01

## 2020-09-01 PROBLEM — R74.8 ELEVATED LIVER ENZYMES: Status: ACTIVE | Noted: 2020-09-01

## 2020-09-01 PROBLEM — J98.4 PNEUMONITIS: Status: ACTIVE | Noted: 2020-09-01

## 2020-09-01 PROBLEM — J90 PLEURAL EFFUSION ON RIGHT: Status: ACTIVE | Noted: 2020-09-01

## 2020-09-01 PROBLEM — J18.9 PNEUMONITIS: Status: ACTIVE | Noted: 2020-09-01

## 2020-09-01 LAB
ALBUMIN SERPL-MCNC: 2.4 G/DL (ref 3.5–5.2)
ALBUMIN/GLOB SERPL: 0.8 G/DL
ALP SERPL-CCNC: 392 U/L (ref 39–117)
ALT SERPL W P-5'-P-CCNC: 70 U/L (ref 1–33)
AMYLASE SERPL-CCNC: 240 U/L (ref 28–100)
ANION GAP SERPL CALCULATED.3IONS-SCNC: 12 MMOL/L (ref 5–15)
ANISOCYTOSIS BLD QL: ABNORMAL
AST SERPL-CCNC: 37 U/L (ref 1–32)
BH CV ECHO MEAS - AO MAX PG (FULL): 8.3 MMHG
BH CV ECHO MEAS - AO MAX PG: 13.7 MMHG
BH CV ECHO MEAS - AO MEAN PG (FULL): 4 MMHG
BH CV ECHO MEAS - AO MEAN PG: 7 MMHG
BH CV ECHO MEAS - AO ROOT AREA (BSA CORRECTED): 1.3
BH CV ECHO MEAS - AO ROOT AREA: 4.5 CM^2
BH CV ECHO MEAS - AO ROOT DIAM: 2.4 CM
BH CV ECHO MEAS - AO V2 MAX: 185 CM/SEC
BH CV ECHO MEAS - AO V2 MEAN: 119 CM/SEC
BH CV ECHO MEAS - AO V2 VTI: 35.1 CM
BH CV ECHO MEAS - AVA(I,A): 2 CM^2
BH CV ECHO MEAS - AVA(I,D): 2 CM^2
BH CV ECHO MEAS - AVA(V,A): 2 CM^2
BH CV ECHO MEAS - AVA(V,D): 2 CM^2
BH CV ECHO MEAS - BSA(HAYCOCK): 1.8 M^2
BH CV ECHO MEAS - BSA: 1.8 M^2
BH CV ECHO MEAS - BZI_BMI: 24.6 KILOGRAMS/M^2
BH CV ECHO MEAS - BZI_METRIC_HEIGHT: 170.2 CM
BH CV ECHO MEAS - BZI_METRIC_WEIGHT: 71.2 KG
BH CV ECHO MEAS - EDV(CUBED): 64 ML
BH CV ECHO MEAS - EDV(MOD-SP4): 104 ML
BH CV ECHO MEAS - EDV(TEICH): 70 ML
BH CV ECHO MEAS - EF(CUBED): 76.2 %
BH CV ECHO MEAS - EF(MOD-SP4): 71 %
BH CV ECHO MEAS - EF(TEICH): 68.7 %
BH CV ECHO MEAS - ESV(CUBED): 15.3 ML
BH CV ECHO MEAS - ESV(MOD-SP4): 30.2 ML
BH CV ECHO MEAS - ESV(TEICH): 21.9 ML
BH CV ECHO MEAS - FS: 38 %
BH CV ECHO MEAS - IVS/LVPW: 0.89
BH CV ECHO MEAS - IVSD: 0.58 CM
BH CV ECHO MEAS - LA DIMENSION: 3 CM
BH CV ECHO MEAS - LA/AO: 1.3
BH CV ECHO MEAS - LAT PEAK E' VEL: 14.4 CM/SEC
BH CV ECHO MEAS - LV DIASTOLIC VOL/BSA (35-75): 57 ML/M^2
BH CV ECHO MEAS - LV MASS(C)D: 66.7 GRAMS
BH CV ECHO MEAS - LV MASS(C)DI: 36.6 GRAMS/M^2
BH CV ECHO MEAS - LV MAX PG: 5.4 MMHG
BH CV ECHO MEAS - LV MEAN PG: 3 MMHG
BH CV ECHO MEAS - LV SYSTOLIC VOL/BSA (12-30): 16.6 ML/M^2
BH CV ECHO MEAS - LV V1 MAX: 116 CM/SEC
BH CV ECHO MEAS - LV V1 MEAN: 86.4 CM/SEC
BH CV ECHO MEAS - LV V1 VTI: 21.9 CM
BH CV ECHO MEAS - LVIDD: 4 CM
BH CV ECHO MEAS - LVIDS: 2.5 CM
BH CV ECHO MEAS - LVLD AP4: 8.4 CM
BH CV ECHO MEAS - LVLS AP4: 6.6 CM
BH CV ECHO MEAS - LVOT AREA (M): 3.1 CM^2
BH CV ECHO MEAS - LVOT AREA: 3.1 CM^2
BH CV ECHO MEAS - LVOT DIAM: 2 CM
BH CV ECHO MEAS - LVPWD: 0.65 CM
BH CV ECHO MEAS - MED PEAK E' VEL: 12.4 CM/SEC
BH CV ECHO MEAS - MR MAX PG: 79.7 MMHG
BH CV ECHO MEAS - MR MAX VEL: 446.3 CM/SEC
BH CV ECHO MEAS - MR MEAN PG: 57 MMHG
BH CV ECHO MEAS - MR MEAN VEL: 355 CM/SEC
BH CV ECHO MEAS - MR VTI: 133 CM
BH CV ECHO MEAS - MV A MAX VEL: 91.3 CM/SEC
BH CV ECHO MEAS - MV DEC TIME: 0.19 SEC
BH CV ECHO MEAS - MV E MAX VEL: 109 CM/SEC
BH CV ECHO MEAS - MV E/A: 1.2
BH CV ECHO MEAS - SI(AO): 87 ML/M^2
BH CV ECHO MEAS - SI(CUBED): 26.7 ML/M^2
BH CV ECHO MEAS - SI(LVOT): 37.7 ML/M^2
BH CV ECHO MEAS - SI(MOD-SP4): 40.5 ML/M^2
BH CV ECHO MEAS - SI(TEICH): 26.4 ML/M^2
BH CV ECHO MEAS - SV(AO): 158.8 ML
BH CV ECHO MEAS - SV(CUBED): 48.7 ML
BH CV ECHO MEAS - SV(LVOT): 68.8 ML
BH CV ECHO MEAS - SV(MOD-SP4): 73.8 ML
BH CV ECHO MEAS - SV(TEICH): 48.1 ML
BH CV ECHO MEAS - TR MAX VEL: 241 CM/SEC
BH CV ECHO MEASUREMENTS AVERAGE E/E' RATIO: 8.13
BILIRUB SERPL-MCNC: 0.5 MG/DL (ref 0–1.2)
BUN SERPL-MCNC: 11 MG/DL (ref 6–20)
BUN/CREAT SERPL: 25.6 (ref 7–25)
BURR CELLS BLD QL SMEAR: ABNORMAL
CALCIUM SPEC-SCNC: 8 MG/DL (ref 8.6–10.5)
CHLORIDE SERPL-SCNC: 97 MMOL/L (ref 98–107)
CHOLEST SERPL-MCNC: 66 MG/DL (ref 0–200)
CLUMPED PLATELETS: PRESENT
CO2 SERPL-SCNC: 25 MMOL/L (ref 22–29)
CREAT SERPL-MCNC: 0.43 MG/DL (ref 0.57–1)
CRP SERPL-MCNC: 39.21 MG/DL (ref 0–0.5)
DEPRECATED RDW RBC AUTO: 45.8 FL (ref 37–54)
ERYTHROCYTE [DISTWIDTH] IN BLOOD BY AUTOMATED COUNT: 14.1 % (ref 12.3–15.4)
GFR SERPL CREATININE-BSD FRML MDRD: >150 ML/MIN/1.73
GLOBULIN UR ELPH-MCNC: 3.1 GM/DL
GLUCOSE SERPL-MCNC: 95 MG/DL (ref 65–99)
HAV IGM SERPL QL IA: NORMAL
HBA1C MFR BLD: 6.1 % (ref 4.8–5.6)
HBV CORE IGM SERPL QL IA: NORMAL
HBV SURFACE AG SERPL QL IA: NORMAL
HCT VFR BLD AUTO: 30.1 % (ref 34–46.6)
HCV AB SER DONR QL: NORMAL
HDLC SERPL-MCNC: 17 MG/DL (ref 40–60)
HGB BLD-MCNC: 9.9 G/DL (ref 12–15.9)
HOLD SPECIMEN: NORMAL
LDLC SERPL CALC-MCNC: 21 MG/DL (ref 0–100)
LDLC/HDLC SERPL: 1.22 {RATIO}
LEFT ATRIUM VOLUME INDEX: 22 ML/M2
LEFT ATRIUM VOLUME: 40 CM3
LIPASE SERPL-CCNC: 195 U/L (ref 13–60)
LYMPHOCYTES # BLD MANUAL: 1.94 10*3/MM3 (ref 0.7–3.1)
LYMPHOCYTES NFR BLD MANUAL: 11.1 % (ref 19.6–45.3)
LYMPHOCYTES NFR BLD MANUAL: 2 % (ref 5–12)
MAXIMAL PREDICTED HEART RATE: 169 BPM
MCH RBC QN AUTO: 29.4 PG (ref 26.6–33)
MCHC RBC AUTO-ENTMCNC: 32.9 G/DL (ref 31.5–35.7)
MCV RBC AUTO: 89.3 FL (ref 79–97)
MONOCYTES # BLD AUTO: 0.35 10*3/MM3 (ref 0.1–0.9)
NEUTROPHILS # BLD AUTO: 14.62 10*3/MM3 (ref 1.7–7)
NEUTROPHILS NFR BLD MANUAL: 83.8 % (ref 42.7–76)
PLATELET # BLD AUTO: 611 10*3/MM3 (ref 140–450)
PMV BLD AUTO: 8 FL (ref 6–12)
POIKILOCYTOSIS BLD QL SMEAR: ABNORMAL
POLYCHROMASIA BLD QL SMEAR: ABNORMAL
POTASSIUM SERPL-SCNC: 3.6 MMOL/L (ref 3.5–5.2)
PROT SERPL-MCNC: 5.5 G/DL (ref 6–8.5)
RBC # BLD AUTO: 3.37 10*6/MM3 (ref 3.77–5.28)
SMALL PLATELETS BLD QL SMEAR: ABNORMAL
SODIUM SERPL-SCNC: 134 MMOL/L (ref 136–145)
STRESS TARGET HR: 144 BPM
T4 FREE SERPL-MCNC: 1.5 NG/DL (ref 0.93–1.7)
TRIGL SERPL-MCNC: 141 MG/DL (ref 0–150)
TSH SERPL DL<=0.05 MIU/L-ACNC: 0.06 UIU/ML (ref 0.27–4.2)
VARIANT LYMPHS NFR BLD MANUAL: 3 % (ref 0–5)
VLDLC SERPL-MCNC: 28.2 MG/DL
WBC # BLD AUTO: 17.45 10*3/MM3 (ref 3.4–10.8)
WBC MORPH BLD: NORMAL

## 2020-09-01 PROCEDURE — 84443 ASSAY THYROID STIM HORMONE: CPT | Performed by: FAMILY MEDICINE

## 2020-09-01 PROCEDURE — 99254 IP/OBS CNSLTJ NEW/EST MOD 60: CPT | Performed by: NURSE PRACTITIONER

## 2020-09-01 PROCEDURE — 99232 SBSQ HOSP IP/OBS MODERATE 35: CPT | Performed by: NURSE PRACTITIONER

## 2020-09-01 PROCEDURE — 71045 X-RAY EXAM CHEST 1 VIEW: CPT

## 2020-09-01 PROCEDURE — 88112 CYTOPATH CELL ENHANCE TECH: CPT | Performed by: NURSE PRACTITIONER

## 2020-09-01 PROCEDURE — 76604 US EXAM CHEST: CPT

## 2020-09-01 PROCEDURE — 83690 ASSAY OF LIPASE: CPT | Performed by: FAMILY MEDICINE

## 2020-09-01 PROCEDURE — 87205 SMEAR GRAM STAIN: CPT | Performed by: NURSE PRACTITIONER

## 2020-09-01 PROCEDURE — 25010000002 HYDROMORPHONE PER 4 MG: Performed by: INTERNAL MEDICINE

## 2020-09-01 PROCEDURE — 87077 CULTURE AEROBIC IDENTIFY: CPT | Performed by: NURSE PRACTITIONER

## 2020-09-01 PROCEDURE — 25010000002 PERFLUTREN 6.52 MG/ML SUSPENSION: Performed by: FAMILY MEDICINE

## 2020-09-01 PROCEDURE — 84439 ASSAY OF FREE THYROXINE: CPT | Performed by: FAMILY MEDICINE

## 2020-09-01 PROCEDURE — 83690 ASSAY OF LIPASE: CPT | Performed by: NURSE PRACTITIONER

## 2020-09-01 PROCEDURE — 25010000002 PIPERACILLIN SOD-TAZOBACTAM PER 1 G: Performed by: INTERNAL MEDICINE

## 2020-09-01 PROCEDURE — 93306 TTE W/DOPPLER COMPLETE: CPT | Performed by: INTERNAL MEDICINE

## 2020-09-01 PROCEDURE — 87070 CULTURE OTHR SPECIMN AEROBIC: CPT | Performed by: INTERNAL MEDICINE

## 2020-09-01 PROCEDURE — 80074 ACUTE HEPATITIS PANEL: CPT | Performed by: FAMILY MEDICINE

## 2020-09-01 PROCEDURE — 0B9M8ZX DRAINAGE OF BILATERAL LUNGS, VIA NATURAL OR ARTIFICIAL OPENING ENDOSCOPIC, DIAGNOSTIC: ICD-10-PCS | Performed by: SURGERY

## 2020-09-01 PROCEDURE — 93306 TTE W/DOPPLER COMPLETE: CPT

## 2020-09-01 PROCEDURE — 71250 CT THORAX DX C-: CPT

## 2020-09-01 PROCEDURE — 0W993ZX DRAINAGE OF RIGHT PLEURAL CAVITY, PERCUTANEOUS APPROACH, DIAGNOSTIC: ICD-10-PCS | Performed by: SURGERY

## 2020-09-01 PROCEDURE — 32555 ASPIRATE PLEURA W/ IMAGING: CPT | Performed by: SURGERY

## 2020-09-01 PROCEDURE — 80053 COMPREHEN METABOLIC PANEL: CPT | Performed by: FAMILY MEDICINE

## 2020-09-01 PROCEDURE — 87015 SPECIMEN INFECT AGNT CONCNTJ: CPT | Performed by: NURSE PRACTITIONER

## 2020-09-01 PROCEDURE — 80061 LIPID PANEL: CPT | Performed by: FAMILY MEDICINE

## 2020-09-01 PROCEDURE — 82945 GLUCOSE OTHER FLUID: CPT | Performed by: NURSE PRACTITIONER

## 2020-09-01 PROCEDURE — 87070 CULTURE OTHR SPECIMN AEROBIC: CPT | Performed by: NURSE PRACTITIONER

## 2020-09-01 PROCEDURE — 88305 TISSUE EXAM BY PATHOLOGIST: CPT | Performed by: NURSE PRACTITIONER

## 2020-09-01 PROCEDURE — 87186 SC STD MICRODIL/AGAR DIL: CPT | Performed by: NURSE PRACTITIONER

## 2020-09-01 PROCEDURE — 85025 COMPLETE CBC W/AUTO DIFF WBC: CPT | Performed by: FAMILY MEDICINE

## 2020-09-01 PROCEDURE — 82150 ASSAY OF AMYLASE: CPT | Performed by: FAMILY MEDICINE

## 2020-09-01 PROCEDURE — 87205 SMEAR GRAM STAIN: CPT | Performed by: INTERNAL MEDICINE

## 2020-09-01 PROCEDURE — 82150 ASSAY OF AMYLASE: CPT | Performed by: NURSE PRACTITIONER

## 2020-09-01 PROCEDURE — 85007 BL SMEAR W/DIFF WBC COUNT: CPT | Performed by: FAMILY MEDICINE

## 2020-09-01 PROCEDURE — 83036 HEMOGLOBIN GLYCOSYLATED A1C: CPT | Performed by: FAMILY MEDICINE

## 2020-09-01 PROCEDURE — 86140 C-REACTIVE PROTEIN: CPT | Performed by: INTERNAL MEDICINE

## 2020-09-01 RX ORDER — METOPROLOL TARTRATE 50 MG/1
50 TABLET, FILM COATED ORAL EVERY 12 HOURS SCHEDULED
Status: DISCONTINUED | OUTPATIENT
Start: 2020-09-01 | End: 2020-09-07 | Stop reason: HOSPADM

## 2020-09-01 RX ADMIN — ESTROGENS, CONJUGATED 0.3 MG: 0.3 TABLET, FILM COATED ORAL at 09:03

## 2020-09-01 RX ADMIN — SODIUM CHLORIDE 100 ML/HR: 9 INJECTION, SOLUTION INTRAVENOUS at 05:00

## 2020-09-01 RX ADMIN — ACETAMINOPHEN 650 MG: 325 TABLET, FILM COATED ORAL at 07:11

## 2020-09-01 RX ADMIN — TAZOBACTAM SODIUM AND PIPERACILLIN SODIUM 3.38 G: 375; 3 INJECTION, SOLUTION INTRAVENOUS at 16:17

## 2020-09-01 RX ADMIN — PERFLUTREN: 6.52 INJECTION, SUSPENSION INTRAVENOUS at 11:22

## 2020-09-01 RX ADMIN — LEVOTHYROXINE SODIUM 112 MCG: 112 TABLET ORAL at 05:02

## 2020-09-01 RX ADMIN — METOPROLOL TARTRATE 50 MG: 50 TABLET ORAL at 20:20

## 2020-09-01 RX ADMIN — FAMOTIDINE 20 MG: 10 INJECTION INTRAVENOUS at 20:20

## 2020-09-01 RX ADMIN — SODIUM CHLORIDE, PRESERVATIVE FREE 10 ML: 5 INJECTION INTRAVENOUS at 12:29

## 2020-09-01 RX ADMIN — HYDROMORPHONE HYDROCHLORIDE 0.5 MG: 1 INJECTION, SOLUTION INTRAMUSCULAR; INTRAVENOUS; SUBCUTANEOUS at 12:29

## 2020-09-01 RX ADMIN — AMITRIPTYLINE HYDROCHLORIDE 150 MG: 75 TABLET, FILM COATED ORAL at 20:20

## 2020-09-01 RX ADMIN — TAZOBACTAM SODIUM AND PIPERACILLIN SODIUM 3.38 G: 375; 3 INJECTION, SOLUTION INTRAVENOUS at 07:37

## 2020-09-01 RX ADMIN — DOXYCYCLINE 100 MG: 100 INJECTION, POWDER, LYOPHILIZED, FOR SOLUTION INTRAVENOUS at 09:03

## 2020-09-01 RX ADMIN — SODIUM CHLORIDE, PRESERVATIVE FREE 10 ML: 5 INJECTION INTRAVENOUS at 20:20

## 2020-09-01 RX ADMIN — HYDROMORPHONE HYDROCHLORIDE 0.5 MG: 1 INJECTION, SOLUTION INTRAMUSCULAR; INTRAVENOUS; SUBCUTANEOUS at 07:37

## 2020-09-01 RX ADMIN — DOXYCYCLINE 100 MG: 100 INJECTION, POWDER, LYOPHILIZED, FOR SOLUTION INTRAVENOUS at 21:22

## 2020-09-01 RX ADMIN — OXYCODONE HYDROCHLORIDE AND ACETAMINOPHEN 1 TABLET: 5; 325 TABLET ORAL at 13:59

## 2020-09-01 RX ADMIN — HYDROMORPHONE HYDROCHLORIDE 0.5 MG: 1 INJECTION, SOLUTION INTRAMUSCULAR; INTRAVENOUS; SUBCUTANEOUS at 19:26

## 2020-09-01 RX ADMIN — FAMOTIDINE 20 MG: 10 INJECTION INTRAVENOUS at 09:03

## 2020-09-01 RX ADMIN — HYDROMORPHONE HYDROCHLORIDE 0.5 MG: 1 INJECTION, SOLUTION INTRAMUSCULAR; INTRAVENOUS; SUBCUTANEOUS at 04:59

## 2020-09-01 RX ADMIN — METOPROLOL TARTRATE 50 MG: 50 TABLET ORAL at 09:03

## 2020-09-01 NOTE — CONSULTS
Referring Provider: Dr. Liang  Reason for Consultation: right pleural effusion    Patient Care Team:  Raegan Dubose DO as PCP - General (Family Medicine)  Josefina Almonte MD as Consulting Physician (Gastroenterology)    Chief complaint cough    Subjective .     History of present illness:  Ms. Fernandez is a 49 year old female with reports she was in her usual state of health until about a week and half ago. She recently traveled to [place] 2 weeks ago and developed a cough and right sided chest pain. She also had pain with deep inspiration. She was seen by [facility] urgent clinic and had a chest x ray done that showed concern for right sided pneumonia. She was told to present to the emergency department and was seen by [facility]. She was given IV zithromax and rocephin. Amylase and lipase were elevated. WBC was elevated at 27,500. CRP 41.5. CT chest showed right sided pleural effusion with associated compressive atelectasis. CT abdomen and pelvis showed mild acute pancreatitis. She was transferred to [facility] for higher level of care and admitted to the cardiac care unit by the hospitalist service. COVID 19 testing was negative. Gastroeneterology was consulted. She is being treated conservatively with IV hydration and NPO. Chest x ray yesterday shows a right sided pleural effusion. Overnight, she became hypoxic and went from being on room air to 10 liters per minute high flow NC, currently at 6 liters per minute with O2 sat 95%. T max 102.3F. She is receiving IV doxycycline and zosyn. Sputum culture is pending. Blood cultures show no growth at 24 hours. Cardiothoracic surgery has been consulted for the aforementioned findings.     Salient past medical history includes anemia, hypothyroidism, and migraines. Past surgical history remarkable for hysterectomy. Typically she is active and is a .      History  Code Status and Medical Interventions:   Ordered at: 08/31/20 0041     Level Of Support Discussed  With:    Patient     Code Status:    CPR     Medical Interventions (Level of Support Prior to Arrest):    Full     Past Medical History:   Diagnosis Date   • Anemia    • Cyst, ovary, follicular    • Hypothyroid    • Lichen sclerosus    • Migraine    ,   Past Surgical History:   Procedure Laterality Date   • BILATERAL SALPINGO OOPHORECTOMY     • BREAST BIOPSY Right    • COLONOSCOPY N/A 1/8/2020    Procedure: COLONOSCOPY WITH ANESTHESIA;  Surgeon: Josefina Almonte MD;  Location: [facility] ENDOSCOPY;  Service: Gastroenterology   • DILATATION AND CURETTAGE  09/13/2012   • HYSTERECTOMY      [facility] w/ BSO due to bleeding issues   • LAPAROSCOPY WITH LASER     • OTHER SURGICAL HISTORY  10/29/2012    [facility] W/T/O 250 G OR LESS: Lap Hyst and BSO   ,   Family History   Problem Relation Age of Onset   • Hypertension Mother    • Other Mother         cerebrovascular accident   • No Known Problems Father    • No Known Problems Sister    • No Known Problems Brother    • Breast cancer Maternal Aunt 64   • Breast cancer Cousin 45   • No Known Problems Daughter    • No Known Problems Son    • No Known Problems Maternal Grandmother    • No Known Problems Paternal Grandmother    • No Known Problems Paternal Aunt    • Ovarian cancer Neg Hx    • Uterine cancer Neg Hx    • Colon cancer Neg Hx    • Melanoma Neg Hx    • Prostate cancer Neg Hx    • BRCA 1/2 Neg Hx    • Endometrial cancer Neg Hx    • Colon polyps Neg Hx    • Esophageal cancer Neg Hx    • Liver cancer Neg Hx    • Liver disease Neg Hx    • Rectal cancer Neg Hx    • Stomach cancer Neg Hx    ,   Social History     Tobacco Use   • Smoking status: Never Smoker   • Smokeless tobacco: Never Used   Substance Use Topics   • Alcohol use: No   • Drug use: No   ,   Medications Prior to Admission   Medication Sig Dispense Refill Last Dose   • amitriptyline (ELAVIL) 150 MG tablet Take 150 mg by mouth every night at bedtime.  0 8/31/2020 at Unknown time   • B Complex Vitamins (VITAMIN B COMPLEX PO)  "Take 1 tablet by mouth Daily.   8/31/2020 at Unknown time   • estrogens, conjugated, (PREMARIN) 0.3 MG tablet Take 1 tablet by mouth Daily. 90 tablet 3 8/31/2020 at Unknown time   • levothyroxine (SYNTHROID, LEVOTHROID) 112 MCG tablet Take 1 tablet by mouth Daily. 30 tablet 0 8/31/2020 at Unknown time   • Magnesium Hydroxide (ARMANDO MILK OF MAGNESIA PO) Take 2 tablets by mouth 2 (Two) Times a Day.   8/31/2020 at Unknown time   • Omega-3 Fatty Acids (FISH OIL) 1200 MG capsule delayed-release Take 1 tablet by mouth Daily.   8/31/2020 at Unknown time   • verapamil (CALAN) 80 MG tablet Take 80 mg by mouth daily.   8/31/2020 at Unknown time   • clobetasol (TEMOVATE) 0.05 % cream Apply 1 application topically to the appropriate area as directed 2 (Two) Times a Day. (Patient taking differently: Apply 1 application topically to the appropriate area as directed As Needed.) 45 g 6 Unknown at Unknown time   , Allergies: Patient has no known allergies.    Review of Systems  Review of Systems   Constitutional: Positive for activity change, fatigue and fever.   HENT: Negative for sore throat, trouble swallowing and voice change.    Respiratory: Positive for cough and shortness of breath. Negative for wheezing.    Cardiovascular: Positive for chest pain (right sided ). Negative for leg swelling.   Gastrointestinal: Negative for abdominal pain, diarrhea, nausea and vomiting.   Genitourinary: Negative for difficulty urinating.   Musculoskeletal: Positive for back pain.   Skin: Negative for rash and wound.   Neurological: Positive for headaches. Negative for dizziness, facial asymmetry and numbness.   Hematological: Does not bruise/bleed easily.   Psychiatric/Behavioral: Negative for agitation and confusion.        Objective     Vital Signs   Visit Vitals  /78   Pulse 93   Temp (!) 100.9 °F (38.3 °C) (Oral)   Resp 23   Ht 170.2 cm (67\")   Wt 71.6 kg (157 lb 14.4 oz)   LMP 08/31/2012   SpO2 95%   BMI 24.73 kg/m²       Physical " Exam   Constitutional: She is oriented to person, place, and time. She appears well-developed. No distress. Nasal cannula in place.   HENT:   Head: Normocephalic.   Eyes: Pupils are equal, round, and reactive to light.   Neck: Normal range of motion. Neck supple. No JVD present.   Cardiovascular: Normal rate, regular rhythm, normal heart sounds and intact distal pulses. Exam reveals no friction rub.   No murmur heard.  Pulmonary/Chest: No stridor. No respiratory distress. She has decreased breath sounds in the right middle field and the right lower field. She has no wheezes. She has no rales.   Abdominal: Soft. She exhibits no distension. There is no tenderness.   Musculoskeletal: She exhibits no edema.   Neurological: She is alert and oriented to person, place, and time. No cranial nerve deficit.   Skin: Skin is warm and dry. No rash noted. She is not diaphoretic. No erythema. No pallor.   Psychiatric: Her behavior is normal.   Vitals reviewed.        LAB:   CBC:  Results from last 7 days   Lab Units 09/01/20  0241 08/31/20  0318   WBC 10*3/mm3 17.45* 29.54*   HEMATOCRIT % 30.1* 29.8*   PLATELETS 10*3/mm3 611* 570*          BMP:)  Results from last 7 days   Lab Units 09/01/20  0241 08/31/20  0318   SODIUM mmol/L 134* 133*   POTASSIUM mmol/L 3.6 4.7   CHLORIDE mmol/L 97* 99   CO2 mmol/L 25.0 25.0   GLUCOSE mg/dL 95 110*   BUN mg/dL 11 13   CREATININE mg/dL 0.43* 0.55*        IMAGES:       Imaging Results (Last 24 Hours)     Procedure Component Value Units Date/Time    US Liver [[id number]] Collected:  08/31/20 1408     Updated:  08/31/20 1413    Narrative:       US LIVER- 8/31/2020 11:49 AM CDT     REASON FOR EXAM: elevated liver enzymes       COMPARISON: NONE      TECHNIQUE: Multiple longitudinal and transverse realtime sonographic  images of the right upper quadrant of the abdomen are obtained.      FINDINGS:    Pancreas: Normal in size and echogenicity.      Liver: Normal in size, echogenicity and echotexture. No  focal lesion.      Gallbladder: Echoes are present in the dependent portion the  gallbladder. This represents sludge. Wall the gallbladder is 4 mm..      Bile ducts: The CBD measures 0.47 cm in diameter. There is no  intrahepatic or extrahepatic ductal dilation.      Other: The aorta is unremarkable..        Impression:       1. Sludge is present in the gallbladder.        2. The liver is unremarkable. Hepatopedal flow is present in the portal  vein.        This report was finalized on 08/31/2020 14:10 by Dr. Nakul Amaro MD.    XR Chest 1 View [[id number]] Collected:  08/31/20 1008     Updated:  08/31/20 1144    Narrative:       EXAMINATION: XR CHEST 1 VW- 8/31/2020 10:08 AM CDT     HISTORY: Shortness of breath     COMPARISON: None     FINDINGS:  Heart and mediastinal contours appear normal. A right pleural effusion  is noted. There is consolidation of the right middle and lower lobes.  There is minimal atelectasis in the left lung base. The left lung  otherwise appears clear.       Impression:       Findings suggest right-sided pneumonia with layering right  pleural effusion.  This report was finalized on 08/31/2020 10:10 by Dr. Lavon Roberts MD.            Outside hospital imaging not available on disc to review               Assessment/Plan          Acute pancreatitis    Pneumonitis    Pleural effusion on right    Elevated liver enzymes      I discussed the patient's findings and my recommendations with patient. We discussed the finding of a right sided pleural effusion. Recommend ultrasound guided right thoracocentesis later today. Discussed the procedural conduct. Briefly reviewed the risks of the procedure as well as the benefits and alternatives. Answered all questions the best of my ability.  DW nursing and ultrasound tech as well.       ANA Hahn  09/01/20  11:13

## 2020-09-01 NOTE — PROGRESS NOTES
[facility] Medicine Services  INPATIENT PROGRESS NOTE    Length of Stay: 2  Date of Admission: 8/30/2020  Primary Care Physician: Raegan Dubose DO    Subjective   Chief Complaint:Shortness of breath/right upper abdomen pain.  Pneumonia/acute pancreatitis/leukocytosis/fever.    HPI   Lipase enzymes improving.  Leukocytosis improving.  T-max 100.9.  T-current 100.9.  Patient still remain tachycardic.  Blood pressure is more stable.  Discussed with patient about pleural effusion, layering-plan for CT surgeon consult to evaluate.  Do not advance patient diet today.  Increasing oxygen demand overnight.    Review of Systems   Constitutional: Positive for activity change, appetite change, fatigue and fever. Negative for chills.   HENT: Negative for hearing loss, nosebleeds, tinnitus and trouble swallowing.    Eyes: Negative for visual disturbance.   Respiratory: Negative for cough, chest tightness, shortness of breath and wheezing.    Cardiovascular: Negative for chest pain, palpitations and leg swelling.   Gastrointestinal: Negative for abdomen pain. negative for abdominal distention, blood in stool, constipation, diarrhea, nausea and vomiting.   Endocrine: Negative for cold intolerance, heat intolerance, polydipsia, polyphagia and polyuria.   Genitourinary: Negative for decreased urine volume, difficulty urinating, dysuria, flank pain, frequency and hematuria.   Musculoskeletal: Positive for arthralgias, gait problem and myalgias. Negative for joint swelling.   Skin: Negative for rash.   Allergic/Immunologic: Negative for immunocompromised state.   Neurological: Positive for weakness. Negative for dizziness, syncope, light-headedness and headaches.   Hematological: Negative for adenopathy. Does not bruise/bleed easily.   Psychiatric/Behavioral: Negative for confusion and sleep disturbance. The patient is not nervous/anxious.    All pertinent negatives and positives are as  above. All other systems have been reviewed and are negative unless otherwise stated.     Objective    Temp:  [98.2 °F (36.8 °C)-100.9 °F (38.3 °C)] 100.9 °F (38.3 °C)  Heart Rate:  [101-138] 129  Resp:  [18-30] 23  BP: ()/(30-98) 139/65    Intake/Output Summary (Last 24 hours) at 9/1/2020 0811  Last data filed at 9/1/2020 0728  Gross per 24 hour   Intake 3166.5 ml   Output 3450 ml   Net -283.5 ml     Physical Exam  Constitutional: She is oriented to person, place, and time. She appears well-developed.   HENT:   Head: Normocephalic.   Eyes: Pupils are equal, round, and reactive to light. Conjunctivae are normal.   Neck: Neck supple. No JVD present.   Cardiovascular: Normal heart sounds and intact distal pulses. Exam reveals no gallop and no friction rub.   No murmur heard.  Tachycardia.   Pulmonary/Chest: No respiratory distress. She has no wheezes. She has no rales. She exhibits no tenderness.   Diminished breath sound on right lung, worse in the lower.  Abdominal: Soft. Bowel sounds are normal. She exhibits no distension.  Nontender abdomen.  There is no rebound and no guarding.   Musculoskeletal: Normal range of motion. She exhibits no edema, tenderness or deformity.   Neurological: She is alert and oriented to person, place, and time. She displays normal reflexes. No cranial nerve deficit. She exhibits abnormal muscle tone. Coordination abnormal.   Skin: Skin is warm and dry. Capillary refill takes 2 to 3 seconds. No rash noted.   Psychiatric: She has a normal mood and affect. Her behavior is normal. Judgment and thought content normal.   Nursing note and vitals reviewed.  Results Review:  Lab Results (last 24 hours)     Procedure Component Value Units Date/Time    CBC & Differential [[id number]] Collected:  09/01/20 0241    Specimen:  Blood Updated:  09/01/20 0356    Narrative:       The following orders were created for panel order CBC & Differential.  Procedure                               Abnormality          Status                     ---------                               -----------         ------                     CBC Auto Differential[[id number]]        Abnormal            Final result                 Please view results for these tests on the individual orders.    CBC Auto Differential [[id number]]  (Abnormal) Collected:  09/01/20 0241    Specimen:  Blood Updated:  09/01/20 0356     WBC 17.45 10*3/mm3      RBC 3.37 10*6/mm3      Hemoglobin 9.9 g/dL      Hematocrit 30.1 %      MCV 89.3 fL      MCH 29.4 pg      MCHC 32.9 g/dL      RDW 14.1 %      RDW-SD 45.8 fl      MPV 8.0 fL      Platelets 611 10*3/mm3     Manual Differential [[id number]]  (Abnormal) Collected:  09/01/20 0241    Specimen:  Blood Updated:  09/01/20 0356     Neutrophil % 83.8 %      Lymphocyte % 11.1 %      Monocyte % 2.0 %      Atypical Lymphocyte % 3.0 %      Neutrophils Absolute 14.62 10*3/mm3      Lymphocytes Absolute 1.94 10*3/mm3      Monocytes Absolute 0.35 10*3/mm3      Anisocytosis Slight/1+     Crenated RBC's Slight/1+     Poikilocytes Mod/2+     Polychromasia Slight/1+     WBC Morphology Normal     Platelet Estimate Increased     Clumped Platelets Present    C-reactive Protein [[id number]]  (Abnormal) Collected:  09/01/20 0241    Specimen:  Blood Updated:  09/01/20 0350     C-Reactive Protein 39.21 mg/dL     Hepatitis Panel, Acute [[id number]] Collected:  09/01/20 0241    Specimen:  Blood Updated:  09/01/20 0344    Narrative:       The following orders were created for panel order Hepatitis Panel, Acute.  Procedure                               Abnormality         Status                     ---------                               -----------         ------                     Hepatitis Panel, Acute[[id number]]       Normal              Final result               Hep B Confirmation Tube[[id number]]                          Final result                 Please view results for these tests on the individual orders.    Hep B Confirmation Tube  [[id number]] Collected:  09/01/20 0241    Specimen:  Blood Updated:  09/01/20 0344     Extra Tube Hold for Add-ons.    Hemoglobin A1c [[id number]]  (Abnormal) Collected:  09/01/20 0241    Specimen:  Blood Updated:  09/01/20 0341     Hemoglobin A1C 6.10 %     Narrative:       Hemoglobin A1C Ranges:    Increased Risk for Diabetes  5.7% to 6.4%  Diabetes                     >= 6.5%  Diabetic Goal                < 7.0%    Hepatitis Panel, Acute [[id number]]  (Normal) Collected:  09/01/20 0241    Specimen:  Blood Updated:  09/01/20 0340     Hepatitis B Surface Ag Non-Reactive     Hep A IgM Non-Reactive     Hep B C IgM Non-Reactive     Hepatitis C Ab Non-Reactive    Narrative:       Results may be falsely decreased if patient taking Biotin.     Comprehensive Metabolic Panel [[id number]]  (Abnormal) Collected:  09/01/20 0241    Specimen:  Blood Updated:  09/01/20 0337     Glucose 95 mg/dL      BUN 11 mg/dL      Creatinine 0.43 mg/dL      Sodium 134 mmol/L      Potassium 3.6 mmol/L      Chloride 97 mmol/L      CO2 25.0 mmol/L      Calcium 8.0 mg/dL      Total Protein 5.5 g/dL      Albumin 2.40 g/dL      ALT (SGPT) 70 U/L      AST (SGOT) 37 U/L      Alkaline Phosphatase 392 U/L      Total Bilirubin 0.5 mg/dL      eGFR Non African Amer >150 mL/min/1.73      Globulin 3.1 gm/dL      A/G Ratio 0.8 g/dL      BUN/Creatinine Ratio 25.6     Anion Gap 12.0 mmol/L     Narrative:       GFR Normal >60  Chronic Kidney Disease <60  Kidney Failure <15      Lipid Panel [[id number]]  (Abnormal) Collected:  09/01/20 0241    Specimen:  Blood Updated:  09/01/20 0337     Total Cholesterol 66 mg/dL      Triglycerides 141 mg/dL      HDL Cholesterol 17 mg/dL      LDL Cholesterol  21 mg/dL      VLDL Cholesterol 28.2 mg/dL      LDL/HDL Ratio 1.22    Narrative:       Cholesterol Reference Ranges  (U.S. Department of Health and Human Services ATP III Classifications)    Desirable          <200 mg/dL  Borderline High    200-239 mg/dL  High Risk          >240  mg/dL      Triglyceride Reference Ranges  (U.S. Department of Health and Human Services ATP III Classifications)    Normal           <150 mg/dL  Borderline High  150-199 mg/dL  High             200-499 mg/dL  Very High        >500 mg/dL    HDL Reference Ranges  (U.S. Department of Health and Human Services ATP III Classifcations)    Low     <40 mg/dl (major risk factor for CHD)  High    >60 mg/dl ('negative' risk factor for CHD)        LDL Reference Ranges  (U.S. Department of Health and Human Services ATP III Classifcations)    Optimal          <100 mg/dL  Near Optimal     100-129 mg/dL  Borderline High  130-159 mg/dL  High             160-189 mg/dL  Very High        >189 mg/dL    Lipase [[id number]]  (Abnormal) Collected:  09/01/20 0241    Specimen:  Blood Updated:  09/01/20 0337     Lipase 195 U/L     Amylase [[id number]]  (Abnormal) Collected:  09/01/20 0241    Specimen:  Blood Updated:  09/01/20 0337     Amylase 240 U/L     TSH [[id number]]  (Abnormal) Collected:  09/01/20 0241    Specimen:  Blood Updated:  09/01/20 0337     TSH 0.063 uIU/mL     Blood Culture With KENISHA - Blood, Arm, Right [[id number]] Collected:  08/31/20 0638    Specimen:  Blood from Arm, Right Updated:  08/31/20 2100     Blood Culture No growth at less than 24 hours    Blood Culture With KENISHA - Blood, Hand, Right [[id number]] Collected:  08/31/20 0614    Specimen:  Blood from Hand, Right Updated:  08/31/20 2100     Blood Culture No growth at less than 24 hours    COVID PRE-OP / PRE-PROCEDURE SCREENING ORDER (NO ISOLATION) - Swab, Nasopharynx [[id number]] Collected:  08/31/20 1029    Specimen:  Swab from Nasopharynx Updated:  08/31/20 1128    Narrative:       The following orders were created for panel order COVID PRE-OP / PRE-PROCEDURE SCREENING ORDER (NO ISOLATION) - Swab, Nasopharynx.  Procedure                               Abnormality         Status                     ---------                               -----------         ------                      Respiratory Panel PCR w/...[[id number]]  Normal              Final result                 Please view results for these tests on the individual orders.    Respiratory Panel PCR w/COVID-19(SARS-CoV-2) LIN/XU/[facility]ARI/[facility] In-House, NP Swab in UTM/VTM, 3-4 HR TAT - Swab, Nasopharynx [[id number]]  (Normal) Collected:  08/31/20 1029    Specimen:  Swab from Nasopharynx Updated:  08/31/20 1128     ADENOVIRUS, PCR Not Detected     Coronavirus 229E Not Detected     Coronavirus HKU1 Not Detected     Coronavirus NL63 Not Detected     Coronavirus OC43 Not Detected     COVID19 Not Detected     Human Metapneumovirus Not Detected     Human Rhinovirus/Enterovirus Not Detected     Influenza A PCR Not Detected     Influenza A H1 Not Detected     Influenza A H1 2009 PCR Not Detected     Influenza A H3 Not Detected     Influenza B PCR Not Detected     Parainfluenza Virus 1 Not Detected     Parainfluenza Virus 2 Not Detected     Parainfluenza Virus 3 Not Detected     Parainfluenza Virus 4 Not Detected     RSV, PCR Not Detected     Bordetella pertussis pcr Not Detected     Bordetella parapertussis PCR Not Detected     Chlamydophila pneumoniae PCR Not Detected     Mycoplasma pneumo by PCR Not Detected    Narrative:       Fact sheet for providers: https://docs.[employer]/wp-content/uploads/KPX1323-1173-KE6.1-EUA-Provider-Fact-Sheet-3.pdf    Fact sheet for patients: https://docs.[employer]/wp-content/uploads/USI1062-4320-PB8.1-EUA-Patient-Fact-Sheet-1.pdf           Cultures:  Blood Culture   Date Value Ref Range Status   08/31/2020 No growth at less than 24 hours  Preliminary   08/31/2020 No growth at less than 24 hours  Preliminary       Radiology Data:    Imaging Results (Last 24 Hours)     Procedure Component Value Units Date/Time    US Liver [[id number]] Collected:  08/31/20 1408     Updated:  08/31/20 1413    Narrative:       US LIVER- 8/31/2020 11:49 AM CDT     REASON FOR EXAM: elevated liver enzymes       COMPARISON:  NONE      TECHNIQUE: Multiple longitudinal and transverse realtime sonographic  images of the right upper quadrant of the abdomen are obtained.      FINDINGS:    Pancreas: Normal in size and echogenicity.      Liver: Normal in size, echogenicity and echotexture. No focal lesion.      Gallbladder: Echoes are present in the dependent portion the  gallbladder. This represents sludge. Wall the gallbladder is 4 mm..      Bile ducts: The CBD measures 0.47 cm in diameter. There is no  intrahepatic or extrahepatic ductal dilation.      Other: The aorta is unremarkable..        Impression:       1. Sludge is present in the gallbladder.        2. The liver is unremarkable. Hepatopedal flow is present in the portal  vein.        This report was finalized on 08/31/2020 14:10 by Dr. Nakul Amaro MD.    XR Chest 1 View [[id number]] Collected:  08/31/20 1008     Updated:  08/31/20 1144    Narrative:       EXAMINATION: XR CHEST 1 VW- 8/31/2020 10:08 AM CDT     HISTORY: Shortness of breath     COMPARISON: None     FINDINGS:  Heart and mediastinal contours appear normal. A right pleural effusion  is noted. There is consolidation of the right middle and lower lobes.  There is minimal atelectasis in the left lung base. The left lung  otherwise appears clear.       Impression:       Findings suggest right-sided pneumonia with layering right  pleural effusion.  This report was finalized on 08/31/2020 10:10 by Dr. Lavon Roberts MD.          No Known Allergies    Scheduled meds:     amitriptyline 150 mg Oral Nightly   doxycycline 100 mg Intravenous Q12H   estrogens (conjugated) 0.3 mg Oral Daily   famotidine 20 mg Intravenous BID   levothyroxine 112 mcg Oral Q AM   piperacillin-tazobactam 3.375 g Intravenous Q8H   sodium chloride 10 mL Intravenous Q12H   verapamil 80 mg Oral Daily       PRN meds:  •  acetaminophen **OR** acetaminophen  •  benzonatate  •  cyclobenzaprine  •  HYDROcod Polst-CPM Polst ER  •  HYDROmorphone  •  ondansetron  •   oxyCODONE-acetaminophen  •  sodium chloride    Assessment/Plan       Acute pancreatitis    Pneumonitis    Pleural effusion on right    Elevated liver enzymes      Plan:  Acute pancreatitis.  Lipase is improving.  GI has been consulted.  CT scan abdomen pelvic 2020 at [facility]- mild acute pancreatitis-no suggestion of pancreatic necrosis or pseudocyst formation, cystocele, status post hysterectomy and bilateral salpingo-oophorectomy     Coughing/right pneumonitis/right pleural effusion.  Continue Zosyn.  Tussionex as needed.  Cont  doxycycline. Cont CT surgeon-possible thoracentesis.   CT scan of the chest 2020 at [facility]- right-sided atelectasis and/or pneumonitis, moderate amount of blood sided pleural effusion.  Chest x-ray-right-sided pneumonia with layering right pleural effusion.     Elevated liver enzyme/gallbladder sludge.  Liver enzyme is improving.  Acute hepatitis panel- neg.    Ultrasound of the liver-Sludge is present in the gallbladder, liver is unremarkable, hepatopedal flow is present in the portal  vein.    Low TSH.  Free t4 pending.      Hypertension/tachycardia.    DC verapamil.  Change to Lopressor.    Echocardiogram.     Depression.  Elavil at night.     Postmenopausal.  Premarin.     Hypothyroidism.  Continue Synthroid.     Pain control.  Dilaudid as needed.  Flexeril as needed. Percocet PRN.     Nausea.  Zofran PRN.  Pepcid.    Nutrition.  Advance diet full liquid diet.     Blood cultures-no growth less than 24 hours.. COVID-19-negative.  Respiratory PCR-negative.  Respiratory culture pending.     Discharge Plannin-5 days.    Electronically signed by Randolph Liang MD, 20, 8:10 AM.

## 2020-09-01 NOTE — PROGRESS NOTES
[facility]  Inpatient Progress Note  Today's date:  09/01/20    Heidi Fernandez  1970       Reason for Follow Up: Acute pancreatitis    Subjective: The patient is sitting up to side of the bed.  She tells me, the pain with my breathing is some better today.  She is to have a bedside thoracentesis today.  The patient had clear liquid diet yesterday without reports of any problems.  Nursing confirms this.  The patient denies any nausea, vomiting, epigastric pain, dysphagia, pyrosis or hematemesis.  The patient denies any fever or chills.  Denies any melena or hematochezia.  Denies any unintentional weight loss or loss of appetite.    Thoracentesis went well.  On full liquids now.      Today amylase 240, lipase 195 trending downward, CRP 39.  ALT 70, AST 37.    Study Result     US LIVER- 8/31/2020 11:49 AM CDT     REASON FOR EXAM: elevated liver enzymes       COMPARISON: NONE    IMPRESSION:  1. Sludge is present in the gallbladder.        2. The liver is unremarkable. Hepatopedal flow is present in the portal  vein.        This report was finalized on 08/31/2020 14:10 by Dr. Nakul Amaro MD.      No Known Allergies    Current Facility-Administered Medications:   •  acetaminophen (TYLENOL) tablet 650 mg, 650 mg, Oral, Q4H PRN, 650 mg at 09/01/20 0711 **OR** acetaminophen (TYLENOL) suppository 650 mg, 650 mg, Rectal, Q4H PRN, Rick Corado DO, 650 mg at 08/31/20 0842  •  amitriptyline (ELAVIL) tablet 150 mg, 150 mg, Oral, Nightly, Rick Corado DO, 150 mg at 08/31/20 2123  •  benzonatate (TESSALON) capsule 100 mg, 100 mg, Oral, TID PRN, Rick Corado DO, 100 mg at 08/31/20 2318  •  cyclobenzaprine (FLEXERIL) tablet 5 mg, 5 mg, Oral, TID PRN, Rick Corado DO  •  doxycycline (VIBRAMYCIN) 100 mg/100 mL 0.9% NS MBP, 100 mg, Intravenous, Q12H, Randolph Liang MD, 100 mg at 09/01/20 0903  •  estrogens (conjugated) (PREMARIN) tablet 0.3 mg, 0.3 mg, Oral, Daily, Mak, Ali  DO Dai, 0.3 mg at 09/01/20 0903  •  famotidine (PEPCID) injection 20 mg, 20 mg, Intravenous, BID, Randolph Liang MD, 20 mg at 09/01/20 0903  •  HYDROcod Polst-CPM Polst ER (TUSSIONEX PENNKINETIC) 10-8 MG/5ML ER suspension 5 mL, 5 mL, Oral, Q12H PRN, Rick Corado DO, 5 mL at 08/31/20 1821  •  HYDROmorphone (DILAUDID) injection 0.5 mg, 0.5 mg, Intravenous, Q2H PRN, Rick Corado DO, 0.5 mg at 09/01/20 0737  •  levothyroxine (SYNTHROID, LEVOTHROID) tablet 112 mcg, 112 mcg, Oral, Q AM, Rick Corado DO, 112 mcg at 09/01/20 0502  •  metoprolol tartrate (LOPRESSOR) tablet 50 mg, 50 mg, Oral, Q12H, Randolph Liang MD, 50 mg at 09/01/20 0903  •  ondansetron (ZOFRAN) injection 4 mg, 4 mg, Intravenous, Q6H PRN, Rick Corado DO  •  oxyCODONE-acetaminophen (PERCOCET) 5-325 MG per tablet 1 tablet, 1 tablet, Oral, Q6H PRN, Rick Corado DO, 1 tablet at 08/31/20 1519  •  piperacillin-tazobactam (ZOSYN) 3.375 g in iso-osmotic dextrose 50 ml (premix), 3.375 g, Intravenous, Q8H, Rick Corado DO, 3.375 g at 09/01/20 0737  •  sodium chloride 0.9 % flush 10 mL, 10 mL, Intravenous, Q12H, Rick Corado DO, 10 mL at 08/31/20 2124  •  sodium chloride 0.9 % flush 10 mL, 10 mL, Intravenous, PRN, Rick Corado DO    Review of Systems:   Review of Systems   Constitutional: Negative for fever and unexpected weight change.   HENT: Negative for hearing loss.    Eyes: Negative for visual disturbance.   Respiratory: Negative for cough.    Cardiovascular: Negative for chest pain.   Gastrointestinal:        See HPI   Endocrine: Negative for cold intolerance and heat intolerance.   Genitourinary: Negative for dysuria.   Musculoskeletal: Negative for arthralgias.   Skin: Negative for rash.   Neurological: Negative for seizures.   Psychiatric/Behavioral: Negative for hallucinations.        Vital Signs:  Temp:  [98.2 °F (36.8 °C)-100.9 °F (38.3 °C)] 100.9 °F (38.3 °C)  Heart Rate:  [] 93  Resp:   [18-30] 23  BP: ()/(30-98) 107/78  Body mass index is 24.73 kg/m².     Intake/Output Summary (Last 24 hours) at 9/1/2020 1102  Last data filed at 9/1/2020 0728  Gross per 24 hour   Intake 3166.5 ml   Output 3450 ml   Net -283.5 ml     I/O this shift:  In: -   Out: 550 [Urine:550]  Physical Exam:  Physical Exam   Constitutional: She appears well-developed.   Cardiovascular: Normal rate and regular rhythm.   Pulmonary/Chest: Effort normal.   Slightly labored with sitting up to side of bed decreased right lobe.  Productive coughing   Abdominal: Soft. Bowel sounds are normal.   Neurological: She is alert.   Psychiatric: She has a normal mood and affect.        Results Review:   I have reviewed all of the patient's current test results    Results from last 7 days   Lab Units 09/01/20  0241 08/31/20 0318   WBC 10*3/mm3 17.45* 29.54*   HEMOGLOBIN g/dL 9.9* 9.9*   HEMATOCRIT % 30.1* 29.8*   PLATELETS 10*3/mm3 611* 570*       Results from last 7 days   Lab Units 09/01/20  0241 08/31/20  0318   SODIUM mmol/L 134* 133*   POTASSIUM mmol/L 3.6 4.7   CHLORIDE mmol/L 97* 99   CO2 mmol/L 25.0 25.0   BUN mg/dL 11 13   CREATININE mg/dL 0.43* 0.55*   CALCIUM mg/dL 8.0* 8.2*   BILIRUBIN mg/dL 0.5 0.3   ALK PHOS U/L 392* 357*   ALT (SGPT) U/L 70* 104*   AST (SGOT) U/L 37* 59*   GLUCOSE mg/dL 95 110*             Lab Results   Lab Value Date/Time    LIPASE 195 (H) 09/01/2020 0241    LIPASE 795 (H) 08/31/2020 0318       Radiology Review:  Imaging Results (Last 24 Hours)     Procedure Component Value Units Date/Time    US Liver [[id number]] Collected:  08/31/20 1408     Updated:  08/31/20 1413    Narrative:       US LIVER- 8/31/2020 11:49 AM CDT     REASON FOR EXAM: elevated liver enzymes       COMPARISON: NONE      TECHNIQUE: Multiple longitudinal and transverse realtime sonographic  images of the right upper quadrant of the abdomen are obtained.      FINDINGS:    Pancreas: Normal in size and echogenicity.      Liver: Normal in  size, echogenicity and echotexture. No focal lesion.      Gallbladder: Echoes are present in the dependent portion the  gallbladder. This represents sludge. Wall the gallbladder is 4 mm..      Bile ducts: The CBD measures 0.47 cm in diameter. There is no  intrahepatic or extrahepatic ductal dilation.      Other: The aorta is unremarkable..        Impression:       1. Sludge is present in the gallbladder.        2. The liver is unremarkable. Hepatopedal flow is present in the portal  vein.        This report was finalized on 08/31/2020 14:10 by Dr. Nakul Amaro MD.    XR Chest 1 View [[id number]] Collected:  08/31/20 1008     Updated:  08/31/20 1144    Narrative:       EXAMINATION: XR CHEST 1 VW- 8/31/2020 10:08 AM CDT     HISTORY: Shortness of breath     COMPARISON: None     FINDINGS:  Heart and mediastinal contours appear normal. A right pleural effusion  is noted. There is consolidation of the right middle and lower lobes.  There is minimal atelectasis in the left lung base. The left lung  otherwise appears clear.       Impression:       Findings suggest right-sided pneumonia with layering right  pleural effusion.  This report was finalized on 08/31/2020 10:10 by Dr. Lavon Roberts MD.          Impression/Plan:  Patient Active Problem List   Diagnosis Code   • Colon cancer screening Z12.11   • Acute pancreatitis K85.90   • Pneumonitis J18.9   • Pleural effusion on right J90   • Elevated liver enzymes R74.8     Acute pancreatitis with elevated liver injury numbers.  This is her first episode.  CT imaging at outside institution does not show any pancreatic mass.  Ultrasound of liver revealed sludge present in gallbladder.  Liver unremarkable.   After recovery from this event, she will need to be referred for endoscopic ultrasound.  I will assist in this regard.  Currently, she has a right-sided pneumonia with pleural effusion. Will change diet from full liquids which is full of fat, to a low fat diet.  Reviewed  with ANA Borrego  09/01/20  11:02     Patient Seen, Chart, Consults, Notes, Labs, Radiology studies reviewed    I have seen and examined patient personally, performing a face-to-face diagnostic evaluation with plan of care reviewed and developed with ANA and nursing staff. I have addended and/or modified the above history of present illness, physical examination, and assessment and plan to reflect my findings and impressions. Essential elements of the care plan were discussed with APRN above.  Agree with findings and assessment/plan as documented above    Josefina Almonte MD  [facility]  09/01/20  14:18    Much of this encounter note is an electronic transcription/translation of spoken language to printed text. The electronic translation of spoken language may permit erroneous, or at times, nonsensical words or phrases to be inadvertently transcribed; although I have reviewed the note for such errors, some may still exist.

## 2020-09-01 NOTE — OP NOTE
Thoracentesis Note    Preoperative Diagnosis: Symptomatic Pleural Effusion    Postoperative Diagnosis: Same    Procedure Performed: Thoracentesis with Ultrasound Guidance    Surgeon: Tyler Coburn M.D.    Indications: 50 yo female with right parapneumonic effusion, recent history of pancreatitis and possible PNA.  Currently on 6L NC.      Findings: 350 cc of straw colored fluid drained    Procedure: Informed consent was obtained.  Ultrasound was used to localize the diaphragm and confirmed a free flowing pleural effusion.  The back was  prepped and draped in normal sterile fashion.  Local anesthesia was injected into the skin and intercostal muscle at the previously marked spot.  Small skin nick was made and the safety centesis catheter was advanced while aspirating into the pleural cavity.  Straw-colored fluid was withdrawn and the safety centesis catheter was advanced using Seldinger technique.  Pleural effusion was drained of approximately 350 cc of straw-colored fluid.  Fluid was sent for cytology Gram stain and culture as well as amylase and lipase and glucose.  Catheter was removed and patient tolerated the procedure well.  Sterile dressing was placed.  A stat chest x-ray was ordered.    Specimens: Fluid for cytology, gram stain and culture, lipase, amylase, glucose    EBL: Minimal    Tyler Coburn M.D.  Cardiothoracic Surgeon

## 2020-09-02 ENCOUNTER — APPOINTMENT (OUTPATIENT)
Dept: CT IMAGING | Facility: HOSPITAL | Age: 50
End: 2020-09-02

## 2020-09-02 ENCOUNTER — ANESTHESIA EVENT (OUTPATIENT)
Dept: PERIOP | Facility: HOSPITAL | Age: 50
End: 2020-09-02

## 2020-09-02 ENCOUNTER — ANESTHESIA (OUTPATIENT)
Dept: PERIOP | Facility: HOSPITAL | Age: 50
End: 2020-09-02

## 2020-09-02 ENCOUNTER — TELEPHONE (OUTPATIENT)
Dept: GASTROENTEROLOGY | Facility: CLINIC | Age: 50
End: 2020-09-02

## 2020-09-02 ENCOUNTER — APPOINTMENT (OUTPATIENT)
Dept: GENERAL RADIOLOGY | Facility: HOSPITAL | Age: 50
End: 2020-09-02

## 2020-09-02 LAB
ABO GROUP BLD: NORMAL
ALBUMIN SERPL-MCNC: 2 G/DL (ref 3.5–5.2)
ALBUMIN/GLOB SERPL: 0.6 G/DL
ALP SERPL-CCNC: 467 U/L (ref 39–117)
ALT SERPL W P-5'-P-CCNC: 48 U/L (ref 1–33)
AMMONIA BLD-SCNC: 20 UMOL/L (ref 11–51)
AMYLASE FLD-CCNC: 224 U/L
ANION GAP SERPL CALCULATED.3IONS-SCNC: 12 MMOL/L (ref 5–15)
ANION GAP SERPL CALCULATED.3IONS-SCNC: 17 MMOL/L (ref 5–15)
ARTERIAL PATENCY WRIST A: POSITIVE
AST SERPL-CCNC: 39 U/L (ref 1–32)
ATMOSPHERIC PRESS: 746 MMHG
BASE EXCESS BLDA CALC-SCNC: 3.8 MMOL/L (ref 0–2)
BASOPHILS # BLD AUTO: 0.06 10*3/MM3 (ref 0–0.2)
BASOPHILS NFR BLD AUTO: 0.3 % (ref 0–1.5)
BDY SITE: ABNORMAL
BILIRUB SERPL-MCNC: 0.5 MG/DL (ref 0–1.2)
BLD GP AB SCN SERPL QL: NEGATIVE
BODY TEMPERATURE: 37 C
BUN SERPL-MCNC: 5 MG/DL (ref 6–20)
BUN SERPL-MCNC: 6 MG/DL (ref 6–20)
BUN/CREAT SERPL: 15.4 (ref 7–25)
BUN/CREAT SERPL: 17.2 (ref 7–25)
CALCIUM SPEC-SCNC: 7.8 MG/DL (ref 8.6–10.5)
CALCIUM SPEC-SCNC: 7.9 MG/DL (ref 8.6–10.5)
CHLORIDE SERPL-SCNC: 95 MMOL/L (ref 98–107)
CHLORIDE SERPL-SCNC: 97 MMOL/L (ref 98–107)
CO2 SERPL-SCNC: 24 MMOL/L (ref 22–29)
CO2 SERPL-SCNC: 25 MMOL/L (ref 22–29)
CREAT SERPL-MCNC: 0.29 MG/DL (ref 0.57–1)
CREAT SERPL-MCNC: 0.39 MG/DL (ref 0.57–1)
CRP SERPL-MCNC: 33.85 MG/DL (ref 0–0.5)
DEPRECATED RDW RBC AUTO: 44.3 FL (ref 37–54)
DEPRECATED RDW RBC AUTO: 45.2 FL (ref 37–54)
EOSINOPHIL # BLD AUTO: 0.07 10*3/MM3 (ref 0–0.4)
EOSINOPHIL NFR BLD AUTO: 0.3 % (ref 0.3–6.2)
ERYTHROCYTE [DISTWIDTH] IN BLOOD BY AUTOMATED COUNT: 13.8 % (ref 12.3–15.4)
ERYTHROCYTE [DISTWIDTH] IN BLOOD BY AUTOMATED COUNT: 13.9 % (ref 12.3–15.4)
GAS FLOW AIRWAY: 8 LPM
GFR SERPL CREATININE-BSD FRML MDRD: >150 ML/MIN/1.73
GFR SERPL CREATININE-BSD FRML MDRD: >150 ML/MIN/1.73
GLOBULIN UR ELPH-MCNC: 3.4 GM/DL
GLUCOSE BLDC GLUCOMTR-MCNC: 120 MG/DL (ref 70–130)
GLUCOSE FLD-MCNC: <54 MG/DL
GLUCOSE SERPL-MCNC: 102 MG/DL (ref 65–99)
GLUCOSE SERPL-MCNC: 150 MG/DL (ref 65–99)
HCO3 BLDA-SCNC: 27.5 MMOL/L (ref 20–26)
HCT VFR BLD AUTO: 28.3 % (ref 34–46.6)
HCT VFR BLD AUTO: 29.2 % (ref 34–46.6)
HGB BLD-MCNC: 9.8 G/DL (ref 12–15.9)
HGB BLD-MCNC: 9.8 G/DL (ref 12–15.9)
IMM GRANULOCYTES # BLD AUTO: 0.21 10*3/MM3 (ref 0–0.05)
IMM GRANULOCYTES NFR BLD AUTO: 0.9 % (ref 0–0.5)
INR PPP: 1.22 (ref 0.91–1.09)
LIPASE SERPL-CCNC: 58 U/L (ref 13–60)
LYMPHOCYTES # BLD AUTO: 1.41 10*3/MM3 (ref 0.7–3.1)
LYMPHOCYTES NFR BLD AUTO: 6.1 % (ref 19.6–45.3)
Lab: ABNORMAL
MCH RBC QN AUTO: 29.6 PG (ref 26.6–33)
MCH RBC QN AUTO: 30.2 PG (ref 26.6–33)
MCHC RBC AUTO-ENTMCNC: 33.6 G/DL (ref 31.5–35.7)
MCHC RBC AUTO-ENTMCNC: 34.6 G/DL (ref 31.5–35.7)
MCV RBC AUTO: 87.3 FL (ref 79–97)
MCV RBC AUTO: 88.2 FL (ref 79–97)
MODALITY: ABNORMAL
MONOCYTES # BLD AUTO: 1.16 10*3/MM3 (ref 0.1–0.9)
MONOCYTES NFR BLD AUTO: 5 % (ref 5–12)
MRSA DNA SPEC QL NAA+PROBE: NORMAL
NEUTROPHILS NFR BLD AUTO: 20.26 10*3/MM3 (ref 1.7–7)
NEUTROPHILS NFR BLD AUTO: 87.4 % (ref 42.7–76)
NRBC BLD AUTO-RTO: 0 /100 WBC (ref 0–0.2)
PCO2 BLDA: 36.9 MM HG (ref 35–45)
PCO2 TEMP ADJ BLD: 36.9 MM HG (ref 35–45)
PH BLDA: 7.48 PH UNITS (ref 7.35–7.45)
PH, TEMP CORRECTED: 7.48 PH UNITS (ref 7.35–7.45)
PLATELET # BLD AUTO: 615 10*3/MM3 (ref 140–450)
PLATELET # BLD AUTO: 621 10*3/MM3 (ref 140–450)
PMV BLD AUTO: 7.9 FL (ref 6–12)
PMV BLD AUTO: 8.1 FL (ref 6–12)
PO2 BLDA: 70.2 MM HG (ref 83–108)
PO2 TEMP ADJ BLD: 70.2 MM HG (ref 83–108)
POTASSIUM SERPL-SCNC: 3.5 MMOL/L (ref 3.5–5.2)
POTASSIUM SERPL-SCNC: 3.6 MMOL/L (ref 3.5–5.2)
PROT SERPL-MCNC: 5.4 G/DL (ref 6–8.5)
PROTHROMBIN TIME: 15 SECONDS (ref 11.9–14.6)
RBC # BLD AUTO: 3.24 10*6/MM3 (ref 3.77–5.28)
RBC # BLD AUTO: 3.31 10*6/MM3 (ref 3.77–5.28)
RH BLD: POSITIVE
SAO2 % BLDCOA: 95.3 % (ref 94–99)
SODIUM SERPL-SCNC: 134 MMOL/L (ref 136–145)
SODIUM SERPL-SCNC: 136 MMOL/L (ref 136–145)
T&S EXPIRATION DATE: NORMAL
VENTILATOR MODE: ABNORMAL
WBC # BLD AUTO: 21.8 10*3/MM3 (ref 3.4–10.8)
WBC # BLD AUTO: 23.17 10*3/MM3 (ref 3.4–10.8)

## 2020-09-02 PROCEDURE — 94640 AIRWAY INHALATION TREATMENT: CPT

## 2020-09-02 PROCEDURE — 25010000002 MORPHINE SULFATE (PF) 2 MG/ML SOLUTION: Performed by: SURGERY

## 2020-09-02 PROCEDURE — 25010000002 PIPERACILLIN SOD-TAZOBACTAM PER 1 G: Performed by: FAMILY MEDICINE

## 2020-09-02 PROCEDURE — 87102 FUNGUS ISOLATION CULTURE: CPT | Performed by: SURGERY

## 2020-09-02 PROCEDURE — 80048 BASIC METABOLIC PNL TOTAL CA: CPT | Performed by: SURGERY

## 2020-09-02 PROCEDURE — 25010000002 PIPERACILLIN SOD-TAZOBACTAM PER 1 G: Performed by: INTERNAL MEDICINE

## 2020-09-02 PROCEDURE — 25010000002 DEXAMETHASONE PER 1 MG: Performed by: ANESTHESIOLOGY

## 2020-09-02 PROCEDURE — 25010000002 DEXAMETHASONE PER 1 MG: Performed by: NURSE ANESTHETIST, CERTIFIED REGISTERED

## 2020-09-02 PROCEDURE — 25010000003 BUPIVACAINE LIPOSOME 1.3 % SUSPENSION: Performed by: SURGERY

## 2020-09-02 PROCEDURE — 87176 TISSUE HOMOGENIZATION CULTR: CPT | Performed by: SURGERY

## 2020-09-02 PROCEDURE — 85025 COMPLETE CBC W/AUTO DIFF WBC: CPT | Performed by: FAMILY MEDICINE

## 2020-09-02 PROCEDURE — 87641 MR-STAPH DNA AMP PROBE: CPT | Performed by: FAMILY MEDICINE

## 2020-09-02 PROCEDURE — 82962 GLUCOSE BLOOD TEST: CPT

## 2020-09-02 PROCEDURE — 94799 UNLISTED PULMONARY SVC/PX: CPT

## 2020-09-02 PROCEDURE — 87070 CULTURE OTHR SPECIMN AEROBIC: CPT | Performed by: SURGERY

## 2020-09-02 PROCEDURE — 80053 COMPREHEN METABOLIC PANEL: CPT | Performed by: FAMILY MEDICINE

## 2020-09-02 PROCEDURE — 99231 SBSQ HOSP IP/OBS SF/LOW 25: CPT | Performed by: NURSE PRACTITIONER

## 2020-09-02 PROCEDURE — 31624 DX BRONCHOSCOPE/LAVAGE: CPT | Performed by: SURGERY

## 2020-09-02 PROCEDURE — 25010000002 SUCCINYLCHOLINE PER 20 MG: Performed by: NURSE ANESTHETIST, CERTIFIED REGISTERED

## 2020-09-02 PROCEDURE — 85610 PROTHROMBIN TIME: CPT | Performed by: NURSE PRACTITIONER

## 2020-09-02 PROCEDURE — 25010000002 VANCOMYCIN 10 G RECONSTITUTED SOLUTION: Performed by: FAMILY MEDICINE

## 2020-09-02 PROCEDURE — 70450 CT HEAD/BRAIN W/O DYE: CPT

## 2020-09-02 PROCEDURE — 85027 COMPLETE CBC AUTOMATED: CPT | Performed by: SURGERY

## 2020-09-02 PROCEDURE — 36600 WITHDRAWAL OF ARTERIAL BLOOD: CPT

## 2020-09-02 PROCEDURE — 87205 SMEAR GRAM STAIN: CPT | Performed by: SURGERY

## 2020-09-02 PROCEDURE — 86140 C-REACTIVE PROTEIN: CPT | Performed by: INTERNAL MEDICINE

## 2020-09-02 PROCEDURE — 25010000002 PROPOFOL 10 MG/ML EMULSION: Performed by: NURSE ANESTHETIST, CERTIFIED REGISTERED

## 2020-09-02 PROCEDURE — 82803 BLOOD GASES ANY COMBINATION: CPT

## 2020-09-02 PROCEDURE — 83690 ASSAY OF LIPASE: CPT | Performed by: INTERNAL MEDICINE

## 2020-09-02 PROCEDURE — 25010000002 VANCOMYCIN 10 G RECONSTITUTED SOLUTION: Performed by: SURGERY

## 2020-09-02 PROCEDURE — 32652 THORACOSCOPY REM TOTL CORTEX: CPT | Performed by: SURGERY

## 2020-09-02 PROCEDURE — 25010000002 NALOXONE PER 1 MG

## 2020-09-02 PROCEDURE — C9290 INJ, BUPIVACAINE LIPOSOME: HCPCS | Performed by: SURGERY

## 2020-09-02 PROCEDURE — 86901 BLOOD TYPING SEROLOGIC RH(D): CPT | Performed by: NURSE PRACTITIONER

## 2020-09-02 PROCEDURE — 86920 COMPATIBILITY TEST SPIN: CPT

## 2020-09-02 PROCEDURE — 25010000002 MIDAZOLAM PER 1 MG: Performed by: ANESTHESIOLOGY

## 2020-09-02 PROCEDURE — 25010000002 PHENYLEPHRINE HCL 0.8 MG/10ML SOLUTION PREFILLED SYRINGE: Performed by: NURSE ANESTHETIST, CERTIFIED REGISTERED

## 2020-09-02 PROCEDURE — 82140 ASSAY OF AMMONIA: CPT | Performed by: INTERNAL MEDICINE

## 2020-09-02 PROCEDURE — 0BNC4ZZ RELEASE RIGHT UPPER LUNG LOBE, PERCUTANEOUS ENDOSCOPIC APPROACH: ICD-10-PCS | Performed by: SURGERY

## 2020-09-02 PROCEDURE — 25010000002 FENTANYL CITRATE (PF) 100 MCG/2ML SOLUTION: Performed by: NURSE ANESTHETIST, CERTIFIED REGISTERED

## 2020-09-02 PROCEDURE — 87075 CULTR BACTERIA EXCEPT BLOOD: CPT | Performed by: SURGERY

## 2020-09-02 PROCEDURE — 25010000002 ONDANSETRON PER 1 MG: Performed by: NURSE ANESTHETIST, CERTIFIED REGISTERED

## 2020-09-02 PROCEDURE — 0BNF4ZZ RELEASE RIGHT LOWER LUNG LOBE, PERCUTANEOUS ENDOSCOPIC APPROACH: ICD-10-PCS | Performed by: SURGERY

## 2020-09-02 PROCEDURE — 86850 RBC ANTIBODY SCREEN: CPT | Performed by: NURSE PRACTITIONER

## 2020-09-02 PROCEDURE — 71045 X-RAY EXAM CHEST 1 VIEW: CPT

## 2020-09-02 PROCEDURE — 86900 BLOOD TYPING SEROLOGIC ABO: CPT | Performed by: NURSE PRACTITIONER

## 2020-09-02 RX ORDER — DEXAMETHASONE SODIUM PHOSPHATE 4 MG/ML
INJECTION, SOLUTION INTRA-ARTICULAR; INTRALESIONAL; INTRAMUSCULAR; INTRAVENOUS; SOFT TISSUE AS NEEDED
Status: DISCONTINUED | OUTPATIENT
Start: 2020-09-02 | End: 2020-09-02 | Stop reason: SURG

## 2020-09-02 RX ORDER — MAGNESIUM HYDROXIDE 1200 MG/15ML
LIQUID ORAL AS NEEDED
Status: DISCONTINUED | OUTPATIENT
Start: 2020-09-02 | End: 2020-09-02 | Stop reason: HOSPADM

## 2020-09-02 RX ORDER — OXYCODONE AND ACETAMINOPHEN 7.5; 325 MG/1; MG/1
2 TABLET ORAL ONCE AS NEEDED
Status: DISCONTINUED | OUTPATIENT
Start: 2020-09-02 | End: 2020-09-02 | Stop reason: HOSPADM

## 2020-09-02 RX ORDER — MIDAZOLAM HYDROCHLORIDE 1 MG/ML
1 INJECTION INTRAMUSCULAR; INTRAVENOUS
Status: COMPLETED | OUTPATIENT
Start: 2020-09-02 | End: 2020-09-02

## 2020-09-02 RX ORDER — SODIUM CHLORIDE 0.9 % (FLUSH) 0.9 %
10 SYRINGE (ML) INJECTION EVERY 12 HOURS SCHEDULED
Status: DISCONTINUED | OUTPATIENT
Start: 2020-09-02 | End: 2020-09-02 | Stop reason: HOSPADM

## 2020-09-02 RX ORDER — SODIUM CHLORIDE, SODIUM LACTATE, POTASSIUM CHLORIDE, CALCIUM CHLORIDE 600; 310; 30; 20 MG/100ML; MG/100ML; MG/100ML; MG/100ML
100 INJECTION, SOLUTION INTRAVENOUS CONTINUOUS
Status: DISCONTINUED | OUTPATIENT
Start: 2020-09-02 | End: 2020-09-03

## 2020-09-02 RX ORDER — MORPHINE SULFATE 2 MG/ML
2 INJECTION, SOLUTION INTRAMUSCULAR; INTRAVENOUS EVERY 4 HOURS PRN
Status: DISCONTINUED | OUTPATIENT
Start: 2020-09-02 | End: 2020-09-03

## 2020-09-02 RX ORDER — FLUMAZENIL 0.1 MG/ML
0.2 INJECTION INTRAVENOUS AS NEEDED
Status: DISCONTINUED | OUTPATIENT
Start: 2020-09-02 | End: 2020-09-02 | Stop reason: HOSPADM

## 2020-09-02 RX ORDER — PHENYLEPHRINE HCL IN 0.9% NACL 0.8MG/10ML
SYRINGE (ML) INTRAVENOUS AS NEEDED
Status: DISCONTINUED | OUTPATIENT
Start: 2020-09-02 | End: 2020-09-02 | Stop reason: SURG

## 2020-09-02 RX ORDER — SUCCINYLCHOLINE CHLORIDE 20 MG/ML
INJECTION INTRAMUSCULAR; INTRAVENOUS AS NEEDED
Status: DISCONTINUED | OUTPATIENT
Start: 2020-09-02 | End: 2020-09-02 | Stop reason: SURG

## 2020-09-02 RX ORDER — LABETALOL HYDROCHLORIDE 5 MG/ML
5 INJECTION, SOLUTION INTRAVENOUS
Status: DISCONTINUED | OUTPATIENT
Start: 2020-09-02 | End: 2020-09-02 | Stop reason: HOSPADM

## 2020-09-02 RX ORDER — ONDANSETRON 2 MG/ML
4 INJECTION INTRAMUSCULAR; INTRAVENOUS AS NEEDED
Status: DISCONTINUED | OUTPATIENT
Start: 2020-09-02 | End: 2020-09-02 | Stop reason: HOSPADM

## 2020-09-02 RX ORDER — DOCUSATE SODIUM 100 MG/1
100 CAPSULE, LIQUID FILLED ORAL DAILY
Status: DISCONTINUED | OUTPATIENT
Start: 2020-09-02 | End: 2020-09-07 | Stop reason: HOSPADM

## 2020-09-02 RX ORDER — NALOXONE HCL 0.4 MG/ML
0.4 VIAL (ML) INJECTION ONCE
Status: COMPLETED | OUTPATIENT
Start: 2020-09-02 | End: 2020-09-02

## 2020-09-02 RX ORDER — OXYCODONE AND ACETAMINOPHEN 10; 325 MG/1; MG/1
1 TABLET ORAL ONCE AS NEEDED
Status: DISCONTINUED | OUTPATIENT
Start: 2020-09-02 | End: 2020-09-02 | Stop reason: HOSPADM

## 2020-09-02 RX ORDER — FENTANYL CITRATE 50 UG/ML
25 INJECTION, SOLUTION INTRAMUSCULAR; INTRAVENOUS
Status: DISCONTINUED | OUTPATIENT
Start: 2020-09-02 | End: 2020-09-02 | Stop reason: HOSPADM

## 2020-09-02 RX ORDER — PROPOFOL 10 MG/ML
VIAL (ML) INTRAVENOUS AS NEEDED
Status: DISCONTINUED | OUTPATIENT
Start: 2020-09-02 | End: 2020-09-02 | Stop reason: SURG

## 2020-09-02 RX ORDER — ACETAMINOPHEN 500 MG
1000 TABLET ORAL ONCE
Status: COMPLETED | OUTPATIENT
Start: 2020-09-02 | End: 2020-09-02

## 2020-09-02 RX ORDER — NALOXONE HYDROCHLORIDE 0.4 MG/ML
INJECTION, SOLUTION INTRAMUSCULAR; INTRAVENOUS; SUBCUTANEOUS
Status: COMPLETED
Start: 2020-09-02 | End: 2020-09-02

## 2020-09-02 RX ORDER — SODIUM CHLORIDE, SODIUM LACTATE, POTASSIUM CHLORIDE, CALCIUM CHLORIDE 600; 310; 30; 20 MG/100ML; MG/100ML; MG/100ML; MG/100ML
9 INJECTION, SOLUTION INTRAVENOUS CONTINUOUS PRN
Status: DISCONTINUED | OUTPATIENT
Start: 2020-09-02 | End: 2020-09-02 | Stop reason: HOSPADM

## 2020-09-02 RX ORDER — OXYCODONE HYDROCHLORIDE AND ACETAMINOPHEN 5; 325 MG/1; MG/1
1 TABLET ORAL
Status: DISCONTINUED | OUTPATIENT
Start: 2020-09-02 | End: 2020-09-03

## 2020-09-02 RX ORDER — SODIUM CHLORIDE 0.9 % (FLUSH) 0.9 %
10 SYRINGE (ML) INJECTION AS NEEDED
Status: DISCONTINUED | OUTPATIENT
Start: 2020-09-02 | End: 2020-09-02 | Stop reason: HOSPADM

## 2020-09-02 RX ORDER — LIDOCAINE HYDROCHLORIDE 10 MG/ML
0.5 INJECTION, SOLUTION EPIDURAL; INFILTRATION; INTRACAUDAL; PERINEURAL ONCE AS NEEDED
Status: DISCONTINUED | OUTPATIENT
Start: 2020-09-02 | End: 2020-09-02 | Stop reason: HOSPADM

## 2020-09-02 RX ORDER — FENTANYL CITRATE 50 UG/ML
INJECTION, SOLUTION INTRAMUSCULAR; INTRAVENOUS AS NEEDED
Status: DISCONTINUED | OUTPATIENT
Start: 2020-09-02 | End: 2020-09-02 | Stop reason: SURG

## 2020-09-02 RX ORDER — DEXAMETHASONE SODIUM PHOSPHATE 4 MG/ML
4 INJECTION, SOLUTION INTRA-ARTICULAR; INTRALESIONAL; INTRAMUSCULAR; INTRAVENOUS; SOFT TISSUE ONCE AS NEEDED
Status: COMPLETED | OUTPATIENT
Start: 2020-09-02 | End: 2020-09-02

## 2020-09-02 RX ORDER — SODIUM CHLORIDE 0.9 % (FLUSH) 0.9 %
3 SYRINGE (ML) INJECTION EVERY 12 HOURS SCHEDULED
Status: DISCONTINUED | OUTPATIENT
Start: 2020-09-02 | End: 2020-09-02 | Stop reason: HOSPADM

## 2020-09-02 RX ORDER — ONDANSETRON 2 MG/ML
INJECTION INTRAMUSCULAR; INTRAVENOUS AS NEEDED
Status: DISCONTINUED | OUTPATIENT
Start: 2020-09-02 | End: 2020-09-02 | Stop reason: SURG

## 2020-09-02 RX ORDER — CEFAZOLIN SODIUM 2 G/50ML
2 SOLUTION INTRAVENOUS ONCE
Status: DISCONTINUED | OUTPATIENT
Start: 2020-09-02 | End: 2020-09-02

## 2020-09-02 RX ORDER — ONDANSETRON 4 MG/1
4 TABLET, FILM COATED ORAL EVERY 6 HOURS PRN
Status: DISCONTINUED | OUTPATIENT
Start: 2020-09-02 | End: 2020-09-07 | Stop reason: HOSPADM

## 2020-09-02 RX ORDER — IPRATROPIUM BROMIDE AND ALBUTEROL SULFATE 2.5; .5 MG/3ML; MG/3ML
SOLUTION RESPIRATORY (INHALATION)
Status: DISCONTINUED
Start: 2020-09-02 | End: 2020-09-02 | Stop reason: WASHOUT

## 2020-09-02 RX ORDER — MORPHINE SULFATE 2 MG/ML
2 INJECTION, SOLUTION INTRAMUSCULAR; INTRAVENOUS
Status: DISCONTINUED | OUTPATIENT
Start: 2020-09-02 | End: 2020-09-02 | Stop reason: HOSPADM

## 2020-09-02 RX ORDER — BUPIVACAINE HCL/0.9 % NACL/PF 0.1 %
2 PLASTIC BAG, INJECTION (ML) EPIDURAL ONCE
Status: COMPLETED | OUTPATIENT
Start: 2020-09-02 | End: 2020-09-02

## 2020-09-02 RX ORDER — NEOSTIGMINE METHYLSULFATE 5 MG/5 ML
SYRINGE (ML) INTRAVENOUS AS NEEDED
Status: DISCONTINUED | OUTPATIENT
Start: 2020-09-02 | End: 2020-09-02 | Stop reason: SURG

## 2020-09-02 RX ORDER — NALOXONE HCL 0.4 MG/ML
0.04 VIAL (ML) INJECTION AS NEEDED
Status: DISCONTINUED | OUTPATIENT
Start: 2020-09-02 | End: 2020-09-02 | Stop reason: HOSPADM

## 2020-09-02 RX ORDER — ALBUTEROL SULFATE 1.25 MG/3ML
1.25 SOLUTION RESPIRATORY (INHALATION)
Status: DISCONTINUED | OUTPATIENT
Start: 2020-09-02 | End: 2020-09-07 | Stop reason: HOSPADM

## 2020-09-02 RX ORDER — SODIUM CHLORIDE 0.9 % (FLUSH) 0.9 %
3-10 SYRINGE (ML) INJECTION AS NEEDED
Status: DISCONTINUED | OUTPATIENT
Start: 2020-09-02 | End: 2020-09-02 | Stop reason: HOSPADM

## 2020-09-02 RX ORDER — ROCURONIUM BROMIDE 10 MG/ML
INJECTION, SOLUTION INTRAVENOUS AS NEEDED
Status: DISCONTINUED | OUTPATIENT
Start: 2020-09-02 | End: 2020-09-02 | Stop reason: SURG

## 2020-09-02 RX ORDER — VECURONIUM BROMIDE 1 MG/ML
INJECTION, POWDER, LYOPHILIZED, FOR SOLUTION INTRAVENOUS AS NEEDED
Status: DISCONTINUED | OUTPATIENT
Start: 2020-09-02 | End: 2020-09-02 | Stop reason: SURG

## 2020-09-02 RX ORDER — NALOXONE HCL 0.4 MG/ML
0.4 VIAL (ML) INJECTION
Status: DISCONTINUED | OUTPATIENT
Start: 2020-09-02 | End: 2020-09-03

## 2020-09-02 RX ORDER — ONDANSETRON 2 MG/ML
4 INJECTION INTRAMUSCULAR; INTRAVENOUS EVERY 6 HOURS PRN
Status: DISCONTINUED | OUTPATIENT
Start: 2020-09-02 | End: 2020-09-07 | Stop reason: HOSPADM

## 2020-09-02 RX ADMIN — Medication 0.4 MG: at 01:10

## 2020-09-02 RX ADMIN — SUCCINYLCHOLINE CHLORIDE 120 MG: 20 INJECTION, SOLUTION INTRAMUSCULAR; INTRAVENOUS at 15:30

## 2020-09-02 RX ADMIN — SODIUM CHLORIDE, PRESERVATIVE FREE 10 ML: 5 INJECTION INTRAVENOUS at 09:16

## 2020-09-02 RX ADMIN — ONDANSETRON HYDROCHLORIDE 4 MG: 2 SOLUTION INTRAMUSCULAR; INTRAVENOUS at 17:49

## 2020-09-02 RX ADMIN — SODIUM CHLORIDE, POTASSIUM CHLORIDE, SODIUM LACTATE AND CALCIUM CHLORIDE 9 ML/HR: 600; 310; 30; 20 INJECTION, SOLUTION INTRAVENOUS at 14:42

## 2020-09-02 RX ADMIN — MIDAZOLAM 1 MG: 1 INJECTION INTRAMUSCULAR; INTRAVENOUS at 14:45

## 2020-09-02 RX ADMIN — Medication 3 MG: at 17:55

## 2020-09-02 RX ADMIN — LEVOTHYROXINE SODIUM 112 MCG: 112 TABLET ORAL at 06:00

## 2020-09-02 RX ADMIN — VANCOMYCIN HYDROCHLORIDE 1250 MG: 10 INJECTION, POWDER, LYOPHILIZED, FOR SOLUTION INTRAVENOUS at 20:58

## 2020-09-02 RX ADMIN — ROCURONIUM BROMIDE 5 MG: 10 INJECTION INTRAVENOUS at 15:30

## 2020-09-02 RX ADMIN — MIDAZOLAM 1 MG: 1 INJECTION INTRAMUSCULAR; INTRAVENOUS at 15:18

## 2020-09-02 RX ADMIN — FAMOTIDINE 20 MG: 10 INJECTION INTRAVENOUS at 09:17

## 2020-09-02 RX ADMIN — FENTANYL CITRATE 50 MCG: 50 INJECTION, SOLUTION INTRAMUSCULAR; INTRAVENOUS at 16:09

## 2020-09-02 RX ADMIN — MORPHINE SULFATE 2 MG: 2 INJECTION, SOLUTION INTRAMUSCULAR; INTRAVENOUS at 22:45

## 2020-09-02 RX ADMIN — FENTANYL CITRATE 50 MCG: 50 INJECTION, SOLUTION INTRAMUSCULAR; INTRAVENOUS at 15:22

## 2020-09-02 RX ADMIN — FENTANYL CITRATE 25 MCG: 50 INJECTION, SOLUTION INTRAMUSCULAR; INTRAVENOUS at 17:47

## 2020-09-02 RX ADMIN — METOPROLOL TARTRATE 50 MG: 50 TABLET ORAL at 20:59

## 2020-09-02 RX ADMIN — Medication 80 MCG: at 16:03

## 2020-09-02 RX ADMIN — Medication 80 MCG: at 16:25

## 2020-09-02 RX ADMIN — IPRATROPIUM BROMIDE 0.5 MG: 0.5 SOLUTION RESPIRATORY (INHALATION) at 20:43

## 2020-09-02 RX ADMIN — PROPOFOL 30 MG: 10 INJECTION, EMULSION INTRAVENOUS at 18:02

## 2020-09-02 RX ADMIN — LIDOCAINE HYDROCHLORIDE 40 MG: 20 INJECTION, SOLUTION INTRAVENOUS at 15:22

## 2020-09-02 RX ADMIN — METOPROLOL TARTRATE 50 MG: 50 TABLET ORAL at 09:15

## 2020-09-02 RX ADMIN — AMITRIPTYLINE HYDROCHLORIDE 150 MG: 75 TABLET, FILM COATED ORAL at 20:59

## 2020-09-02 RX ADMIN — Medication 2 G: at 15:24

## 2020-09-02 RX ADMIN — DEXAMETHASONE SODIUM PHOSPHATE 4 MG: 4 INJECTION, SOLUTION INTRAMUSCULAR; INTRAVENOUS at 16:10

## 2020-09-02 RX ADMIN — TAZOBACTAM SODIUM AND PIPERACILLIN SODIUM 3.38 G: 375; 3 INJECTION, SOLUTION INTRAVENOUS at 09:15

## 2020-09-02 RX ADMIN — VECURONIUM BROMIDE 5 MG: 1 INJECTION, POWDER, LYOPHILIZED, FOR SOLUTION INTRAVENOUS at 15:35

## 2020-09-02 RX ADMIN — DEXAMETHASONE SODIUM PHOSPHATE 4 MG: 4 INJECTION, SOLUTION INTRAMUSCULAR; INTRAVENOUS at 14:39

## 2020-09-02 RX ADMIN — ALBUTEROL SULFATE 1.25 MG: 1.25 SOLUTION RESPIRATORY (INHALATION) at 11:40

## 2020-09-02 RX ADMIN — DOCUSATE SODIUM 100 MG: 100 CAPSULE ORAL at 21:01

## 2020-09-02 RX ADMIN — Medication 80 MCG: at 16:48

## 2020-09-02 RX ADMIN — Medication 80 MCG: at 15:47

## 2020-09-02 RX ADMIN — TAZOBACTAM SODIUM AND PIPERACILLIN SODIUM 3.38 G: 375; 3 INJECTION, SOLUTION INTRAVENOUS at 00:09

## 2020-09-02 RX ADMIN — IPRATROPIUM BROMIDE 0.5 MG: 0.5 SOLUTION RESPIRATORY (INHALATION) at 11:39

## 2020-09-02 RX ADMIN — Medication 80 MCG: at 16:57

## 2020-09-02 RX ADMIN — VANCOMYCIN HYDROCHLORIDE 1250 MG: 10 INJECTION, POWDER, LYOPHILIZED, FOR SOLUTION INTRAVENOUS at 09:33

## 2020-09-02 RX ADMIN — PROPOFOL 200 MG: 10 INJECTION, EMULSION INTRAVENOUS at 15:30

## 2020-09-02 RX ADMIN — GLYCOPYRROLATE 0.4 MG: 0.2 INJECTION, SOLUTION INTRAMUSCULAR; INTRAVENOUS at 17:55

## 2020-09-02 RX ADMIN — ACETAMINOPHEN 1000 MG: 500 TABLET, FILM COATED ORAL at 14:39

## 2020-09-02 RX ADMIN — ESTROGENS, CONJUGATED 0.3 MG: 0.3 TABLET, FILM COATED ORAL at 09:15

## 2020-09-02 RX ADMIN — SODIUM CHLORIDE, POTASSIUM CHLORIDE, SODIUM LACTATE AND CALCIUM CHLORIDE 100 ML/HR: 600; 310; 30; 20 INJECTION, SOLUTION INTRAVENOUS at 14:39

## 2020-09-02 RX ADMIN — SODIUM CHLORIDE, POTASSIUM CHLORIDE, SODIUM LACTATE AND CALCIUM CHLORIDE 100 ML/HR: 600; 310; 30; 20 INJECTION, SOLUTION INTRAVENOUS at 21:01

## 2020-09-02 RX ADMIN — FAMOTIDINE 20 MG: 10 INJECTION INTRAVENOUS at 21:01

## 2020-09-02 RX ADMIN — NALOXONE HYDROCHLORIDE 0.4 MG: 0.4 INJECTION, SOLUTION INTRAMUSCULAR; INTRAVENOUS; SUBCUTANEOUS at 01:10

## 2020-09-02 RX ADMIN — OXYCODONE HYDROCHLORIDE AND ACETAMINOPHEN 1 TABLET: 5; 325 TABLET ORAL at 21:13

## 2020-09-02 RX ADMIN — FENTANYL CITRATE 50 MCG: 50 INJECTION, SOLUTION INTRAMUSCULAR; INTRAVENOUS at 15:30

## 2020-09-02 RX ADMIN — SODIUM CHLORIDE, PRESERVATIVE FREE 10 ML: 5 INJECTION INTRAVENOUS at 21:01

## 2020-09-02 RX ADMIN — ALBUTEROL SULFATE 1.25 MG: 1.25 SOLUTION RESPIRATORY (INHALATION) at 20:42

## 2020-09-02 RX ADMIN — SODIUM CHLORIDE 500 ML: 9 INJECTION, SOLUTION INTRAVENOUS at 19:47

## 2020-09-02 RX ADMIN — FENTANYL CITRATE 25 MCG: 50 INJECTION, SOLUTION INTRAMUSCULAR; INTRAVENOUS at 17:50

## 2020-09-02 NOTE — ANESTHESIA PROCEDURE NOTES
Arterial Line      Patient location during procedure: pre-op  Start time: 9/2/2020 2:45 PM  Stop Time:9/2/2020 2:46 PM       Line placed for hemodynamic monitoring, ABGs/Labs/ISTAT and MD/Surgeon request.  Performed By   Anesthesiologist: Raegan Medina MD  Preanesthetic Checklist  Completed: patient identified, site marked, surgical consent, pre-op evaluation, timeout performed, IV checked, risks and benefits discussed and monitors and equipment checked  Arterial Line Prep   Sterile Tech: gloves and sterile barriers  Prep: ChloraPrep  Patient monitoring: blood pressure monitoring, continuous pulse oximetry and EKG  Arterial Line Procedure   Laterality:left  Location:  radial artery  Catheter size: 20 G   Guidance: ultrasound guided  PROCEDURE NOTE/ULTRASOUND INTERPRETATION.  Using ultrasound guidance the potential vascular sites for insertion of the catheter were visualized to determine the patency of the vessel to be used for vascular access.  After selecting the appropriate site for insertion, the needle was visualized under ultrasound being inserted into the radial artery, followed by ultrasound confirmation of wire and catheter placement. There were no abnormalities seen on ultrasound; an image was taken; and the patient tolerated the procedure with no complications.   Number of attempts: 1  Successful placement: yes  Post Assessment   Dressing Type: occlusive dressing applied, secured with tape and wrist guard applied.   Complications no  Circ/Move/Sens Assessment: normal and unchanged.   Patient Tolerance: patient tolerated the procedure well with no apparent complications

## 2020-09-02 NOTE — ANESTHESIA PREPROCEDURE EVALUATION
Anesthesia Evaluation     Patient summary reviewed   no history of anesthetic complications:  NPO Solid Status: > 8 hours  NPO Liquid Status: > 8 hours           Airway   Mallampati: I  TM distance: >3 FB  Neck ROM: full  No difficulty expected  Dental      Pulmonary    (+) pneumonia , pleural effusion,   (-) COPD, asthma, sleep apnea, not a smoker    ROS comment: CT Chest:   IMPRESSION:  1. Moderate lobulated/loculated right pleural effusion with loculated fluid in the right major fissure. Scattered bilateral patchy pulmonary opacities representing multifocal pneumonia. Trapped portions of the right lower lobe with compressive atelectasis of right middle and lower lobes. Reactive mediastinal lymph nodes.  This report was finalized on 09/01/2020 17:43 by Dr. Nikki Gordon MD  Cardiovascular   Exercise tolerance: good (4-7 METS)    (-) hypertension, past MI, CAD, dysrhythmias, cardiac stents, hyperlipidemia    ROS comment: TTE:  ·Left ventricular systolic function is normal. Estimate ejection fraction of 61 to 65%.  ·Left ventricular diastolic function is normal.  ·Normal right ventricular cavity size and systolic function noted.  ·Mild mitral valve regurgitation is present    Neuro/Psych  (+) headaches,     (-) seizures, TIA, CVA  GI/Hepatic/Renal/Endo    (+)   thyroid problem hypothyroidism    Musculoskeletal     Abdominal    Substance History      OB/GYN          Other        ROS/Med Hx Other: Sepsis                Anesthesia Plan    ASA 3 - emergent     general   (Discussed case with Dr. Coburn. Plan to intubate with SLT, surgeon will bronch and then will replace with COMPA.)  intravenous induction     Anesthetic plan, all risks, benefits, and alternatives have been provided, discussed and informed consent has been obtained with: patient.

## 2020-09-02 NOTE — PROGRESS NOTES
"    [facility] Cardiothoracic Surgery  PROGRESS NOTE     Subjective:  Mrs. Fernandez did well overnight but remains on 6L NC.  She did have some confusion which is improved this morning.  CT Chest was performed and shows a complex loculated right pleural effusion with likely PNA.    Objective:      /63   Pulse 116   Temp (!) 101.2 °F (38.4 °C) (Oral)   Resp 22   Ht 170.2 cm (67\")   Wt 70.9 kg (156 lb 3.2 oz)   LMP 08/31/2012   SpO2 99%   BMI 24.46 kg/m²       Intake/Output Summary (Last 24 hours) at 9/2/2020 0821  Last data filed at 9/2/2020 0444  Gross per 24 hour   Intake 1814.5 ml   Output 1600 ml   Net 214.5 ml       PE:  GENERAL: NAD, resting comfortably, normal palor  CARDIOVASCULAR: regular, tachycardiac, sinus  PULMONARY: decreased breath sounds on right, normal on left, slightly labored breathing  ABDOMEN: soft, nt/nd  EXTREMITIES: mild to moderate peripheral edema, normal pulses, normal ROM        Lab Results (last 72 hours)     Procedure Component Value Units Date/Time    Non-gynecologic Cytology [[id number]] Collected:  09/01/20 1346    Specimen:  Body Fluid from Pleural Cavity Updated:  09/02/20 0804    Body Fluid Culture - Body Fluid, Pleural Cavity [[id number]] Collected:  09/01/20 1346    Specimen:  Body Fluid from Pleural Cavity Updated:  09/02/20 0715     Body Fluid Culture No growth     Gram Stain Many (4+) WBCs seen      No organisms seen    Respiratory Culture - Sputum, Cough [[id number]] Collected:  09/01/20 1920    Specimen:  Sputum from Cough Updated:  09/02/20 0714     Gram Stain Greater than 25 WBCs per low power field      Less than 25 Epithelial cells per low power field      Rare (1+) Mixed gram positive simran    Comprehensive Metabolic Panel [[id number]]  (Abnormal) Collected:  09/02/20 0615    Specimen:  Blood Updated:  09/02/20 0649     Glucose 102 mg/dL      BUN 5 mg/dL      Creatinine 0.29 mg/dL      Sodium 134 mmol/L      Potassium 3.5 mmol/L      Comment: " Slight hemolysis detected by analyzer. Results may be affected.        Chloride 97 mmol/L      CO2 25.0 mmol/L      Calcium 7.9 mg/dL      Total Protein 5.4 g/dL      Albumin 2.00 g/dL      ALT (SGPT) 48 U/L      AST (SGOT) 39 U/L      Alkaline Phosphatase 467 U/L      Total Bilirubin 0.5 mg/dL      eGFR Non African Amer >150 mL/min/1.73      Globulin 3.4 gm/dL      A/G Ratio 0.6 g/dL      BUN/Creatinine Ratio 17.2     Anion Gap 12.0 mmol/L     Narrative:       GFR Normal >60  Chronic Kidney Disease <60  Kidney Failure <15      C-reactive Protein [[id number]]  (Abnormal) Collected:  09/02/20 0615    Specimen:  Blood Updated:  09/02/20 0644     C-Reactive Protein 33.85 mg/dL     Lipase [[id number]]  (Normal) Collected:  09/02/20 0615    Specimen:  Blood Updated:  09/02/20 0644     Lipase 58 U/L     CBC & Differential [[id number]] Collected:  09/02/20 0615    Specimen:  Blood Updated:  09/02/20 0626    Narrative:       The following orders were created for panel order CBC & Differential.  Procedure                               Abnormality         Status                     ---------                               -----------         ------                     CBC Auto Differential[[id number]]        Abnormal            Final result                 Please view results for these tests on the individual orders.    CBC Auto Differential [[id number]]  (Abnormal) Collected:  09/02/20 0615    Specimen:  Blood Updated:  09/02/20 0626     WBC 23.17 10*3/mm3      RBC 3.24 10*6/mm3      Hemoglobin 9.8 g/dL      Hematocrit 28.3 %      MCV 87.3 fL      MCH 30.2 pg      MCHC 34.6 g/dL      RDW 13.8 %      RDW-SD 44.3 fl      MPV 8.1 fL      Platelets 615 10*3/mm3      Neutrophil % 87.4 %      Lymphocyte % 6.1 %      Monocyte % 5.0 %      Eosinophil % 0.3 %      Basophil % 0.3 %      Immature Grans % 0.9 %      Neutrophils, Absolute 20.26 10*3/mm3      Lymphocytes, Absolute 1.41 10*3/mm3      Monocytes, Absolute 1.16 10*3/mm3       Eosinophils, Absolute 0.07 10*3/mm3      Basophils, Absolute 0.06 10*3/mm3      Immature Grans, Absolute 0.21 10*3/mm3      nRBC 0.0 /100 WBC     Ammonia [[id number]]  (Normal) Collected:  09/02/20 0047    Specimen:  Blood Updated:  09/02/20 0109     Ammonia 20 umol/L     POC Glucose Once [[id number]]  (Normal) Collected:  09/02/20 0044    Specimen:  Blood Updated:  09/02/20 0045     Glucose 120 mg/dL      Comment: : [id number] Abi AmayaElayne ID: [id number]       Blood Gas, Arterial [[id number]]  (Abnormal) Collected:  09/02/20 0030    Specimen:  Arterial Blood Updated:  09/02/20 0035     Site Right Radial     Erasmo's Test Positive     pH, Arterial 7.480 pH units      Comment: 83 Value above reference range        pCO2, Arterial 36.9 mm Hg      pO2, Arterial 70.2 mm Hg      Comment: 84 Value below reference range        HCO3, Arterial 27.5 mmol/L      Comment: 83 Value above reference range        Base Excess, Arterial 3.8 mmol/L      Comment: 83 Value above reference range        O2 Saturation, Arterial 95.3 %      Temperature 37.0 C      Barometric Pressure for Blood Gas 746 mmHg      Modality HFNC     Flow Rate 8.0 lpm      Ventilator Mode NA     Collected by [id number]     Comment: Meter: [id number]     :  [id number]        pCO2, Temperature Corrected 36.9 mm Hg      pH, Temp Corrected 7.480 pH Units      pO2, Temperature Corrected 70.2 mm Hg     Lipase, Body Fluid - Pleural Fluid, Pleural Cavity [[id number]] Collected:  09/01/20 1347    Specimen:  Pleural Fluid from Pleural Cavity Updated:  09/01/20 1407    Amylase, Body Fluid - Pleural Fluid, Pleural Cavity [[id number]] Collected:  09/01/20 1347    Specimen:  Pleural Fluid from Pleural Cavity Updated:  09/01/20 1407    Glucose, Body Fluid - Pleural Fluid, Pleural Cavity [[id number]] Collected:  09/01/20 1347    Specimen:  Pleural Fluid from Pleural Cavity Updated:  09/01/20 1407    Blood Culture With KENISHA - Blood, Arm, Right [[id number]] Collected:   08/31/20 0638    Specimen:  Blood from Arm, Right Updated:  09/01/20 0900     Blood Culture No growth at 24 hours    Blood Culture With KENISHA - Blood, Hand, Right [[id number]] Collected:  08/31/20 0614    Specimen:  Blood from Hand, Right Updated:  09/01/20 0900     Blood Culture No growth at 24 hours    T4, Free [[id number]]  (Normal) Collected:  09/01/20 0241    Specimen:  Blood Updated:  09/01/20 0840     Free T4 1.50 ng/dL     Narrative:       Results may be falsely increased if patient taking Biotin.      CBC & Differential [[id number]] Collected:  09/01/20 0241    Specimen:  Blood Updated:  09/01/20 0356    Narrative:       The following orders were created for panel order CBC & Differential.  Procedure                               Abnormality         Status                     ---------                               -----------         ------                     CBC Auto Differential[[id number]]        Abnormal            Final result                 Please view results for these tests on the individual orders.    CBC Auto Differential [[id number]]  (Abnormal) Collected:  09/01/20 0241    Specimen:  Blood Updated:  09/01/20 0356     WBC 17.45 10*3/mm3      RBC 3.37 10*6/mm3      Hemoglobin 9.9 g/dL      Hematocrit 30.1 %      MCV 89.3 fL      MCH 29.4 pg      MCHC 32.9 g/dL      RDW 14.1 %      RDW-SD 45.8 fl      MPV 8.0 fL      Platelets 611 10*3/mm3     Manual Differential [[id number]]  (Abnormal) Collected:  09/01/20 0241    Specimen:  Blood Updated:  09/01/20 0356     Neutrophil % 83.8 %      Lymphocyte % 11.1 %      Monocyte % 2.0 %      Atypical Lymphocyte % 3.0 %      Neutrophils Absolute 14.62 10*3/mm3      Lymphocytes Absolute 1.94 10*3/mm3      Monocytes Absolute 0.35 10*3/mm3      Anisocytosis Slight/1+     Crenated RBC's Slight/1+     Poikilocytes Mod/2+     Polychromasia Slight/1+     WBC Morphology Normal     Platelet Estimate Increased     Clumped Platelets Present    C-reactive Protein [[id number]]   (Abnormal) Collected:  09/01/20 0241    Specimen:  Blood Updated:  09/01/20 0350     C-Reactive Protein 39.21 mg/dL     Hepatitis Panel, Acute [[id number]] Collected:  09/01/20 0241    Specimen:  Blood Updated:  09/01/20 0344    Narrative:       The following orders were created for panel order Hepatitis Panel, Acute.  Procedure                               Abnormality         Status                     ---------                               -----------         ------                     Hepatitis Panel, Acute[[id number]]       Normal              Final result               Hep B Confirmation Tube[[id number]]                          Final result                 Please view results for these tests on the individual orders.    Hep B Confirmation Tube [[id number]] Collected:  09/01/20 0241    Specimen:  Blood Updated:  09/01/20 0344     Extra Tube Hold for Add-ons.    Hemoglobin A1c [[id number]]  (Abnormal) Collected:  09/01/20 0241    Specimen:  Blood Updated:  09/01/20 0341     Hemoglobin A1C 6.10 %     Narrative:       Hemoglobin A1C Ranges:    Increased Risk for Diabetes  5.7% to 6.4%  Diabetes                     >= 6.5%  Diabetic Goal                < 7.0%    Hepatitis Panel, Acute [[id number]]  (Normal) Collected:  09/01/20 0241    Specimen:  Blood Updated:  09/01/20 0340     Hepatitis B Surface Ag Non-Reactive     Hep A IgM Non-Reactive     Hep B C IgM Non-Reactive     Hepatitis C Ab Non-Reactive    Narrative:       Results may be falsely decreased if patient taking Biotin.     Comprehensive Metabolic Panel [[id number]]  (Abnormal) Collected:  09/01/20 0241    Specimen:  Blood Updated:  09/01/20 0337     Glucose 95 mg/dL      BUN 11 mg/dL      Creatinine 0.43 mg/dL      Sodium 134 mmol/L      Potassium 3.6 mmol/L      Chloride 97 mmol/L      CO2 25.0 mmol/L      Calcium 8.0 mg/dL      Total Protein 5.5 g/dL      Albumin 2.40 g/dL      ALT (SGPT) 70 U/L      AST (SGOT) 37 U/L      Alkaline Phosphatase 392 U/L       Total Bilirubin 0.5 mg/dL      eGFR Non African Amer >150 mL/min/1.73      Globulin 3.1 gm/dL      A/G Ratio 0.8 g/dL      BUN/Creatinine Ratio 25.6     Anion Gap 12.0 mmol/L     Narrative:       GFR Normal >60  Chronic Kidney Disease <60  Kidney Failure <15      Lipid Panel [[id number]]  (Abnormal) Collected:  09/01/20 0241    Specimen:  Blood Updated:  09/01/20 0337     Total Cholesterol 66 mg/dL      Triglycerides 141 mg/dL      HDL Cholesterol 17 mg/dL      LDL Cholesterol  21 mg/dL      VLDL Cholesterol 28.2 mg/dL      LDL/HDL Ratio 1.22    Narrative:       Cholesterol Reference Ranges  (U.S. Department of Health and Human Services ATP III Classifications)    Desirable          <200 mg/dL  Borderline High    200-239 mg/dL  High Risk          >240 mg/dL      Triglyceride Reference Ranges  (U.S. Department of Health and Human Services ATP III Classifications)    Normal           <150 mg/dL  Borderline High  150-199 mg/dL  High             200-499 mg/dL  Very High        >500 mg/dL    HDL Reference Ranges  (U.S. Department of Health and Human Services ATP III Classifcations)    Low     <40 mg/dl (major risk factor for CHD)  High    >60 mg/dl ('negative' risk factor for CHD)        LDL Reference Ranges  (U.S. Department of Health and Human Services ATP III Classifcations)    Optimal          <100 mg/dL  Near Optimal     100-129 mg/dL  Borderline High  130-159 mg/dL  High             160-189 mg/dL  Very High        >189 mg/dL    Lipase [[id number]]  (Abnormal) Collected:  09/01/20 0241    Specimen:  Blood Updated:  09/01/20 0337     Lipase 195 U/L     Amylase [[id number]]  (Abnormal) Collected:  09/01/20 0241    Specimen:  Blood Updated:  09/01/20 0337     Amylase 240 U/L     TSH [[id number]]  (Abnormal) Collected:  09/01/20 0241    Specimen:  Blood Updated:  09/01/20 0337     TSH 0.063 uIU/mL     COVID PRE-OP / PRE-PROCEDURE SCREENING ORDER (NO ISOLATION) - Swab, Nasopharynx [[id number]] Collected:  08/31/20 1029     Specimen:  Swab from Nasopharynx Updated:  08/31/20 1128    Narrative:       The following orders were created for panel order COVID PRE-OP / PRE-PROCEDURE SCREENING ORDER (NO ISOLATION) - Swab, Nasopharynx.  Procedure                               Abnormality         Status                     ---------                               -----------         ------                     Respiratory Panel PCR w/...[[id number]]  Normal              Final result                 Please view results for these tests on the individual orders.    Respiratory Panel PCR w/COVID-19(SARS-CoV-2) [facility] In-House, NP Swab in UTM/VTM, 3-4 HR TAT - Swab, Nasopharynx [[id number]]  (Normal) Collected:  08/31/20 1029    Specimen:  Swab from Nasopharynx Updated:  08/31/20 1128     ADENOVIRUS, PCR Not Detected     Coronavirus 229E Not Detected     Coronavirus HKU1 Not Detected     Coronavirus NL63 Not Detected     Coronavirus OC43 Not Detected     COVID19 Not Detected     Human Metapneumovirus Not Detected     Human Rhinovirus/Enterovirus Not Detected     Influenza A PCR Not Detected     Influenza A H1 Not Detected     Influenza A H1 2009 PCR Not Detected     Influenza A H3 Not Detected     Influenza B PCR Not Detected     Parainfluenza Virus 1 Not Detected     Parainfluenza Virus 2 Not Detected     Parainfluenza Virus 3 Not Detected     Parainfluenza Virus 4 Not Detected     RSV, PCR Not Detected     Bordetella pertussis pcr Not Detected     Bordetella parapertussis PCR Not Detected     Chlamydophila pneumoniae PCR Not Detected     Mycoplasma pneumo by PCR Not Detected    Narrative:       Fact sheet for providers: https://docs.[employer]/wp-content/uploads/JLR5135-7987-SE9.1-EUA-Provider-Fact-Sheet-3.pdf    Fact sheet for patients: https://docs.[employer]/wp-content/uploads/QCR7503-1378-WD3.1-EUA-Patient-Fact-Sheet-1.pdf    Extra Tubes [[id number]] Collected:  08/31/20 0638    Specimen:  Blood, Venous Line Updated:   08/31/20 0745    Narrative:       The following orders were created for panel order Extra Tubes.  Procedure                               Abnormality         Status                     ---------                               -----------         ------                     Lavender Top[[id number]]                                     Final result               Green Top (Gel)[[id number]]                                  Final result                 Please view results for these tests on the individual orders.    Lavender Top [[id number]] Collected:  08/31/20 0638    Specimen:  Blood Updated:  08/31/20 0745     Extra Tube hold for add-on     Comment: Auto resulted       Green Top (Gel) [[id number]] Collected:  08/31/20 0638    Specimen:  Blood Updated:  08/31/20 0745     Extra Tube Hold for add-ons.     Comment: Auto resulted.       Manual Differential [[id number]]  (Abnormal) Collected:  08/31/20 0318    Specimen:  Blood Updated:  08/31/20 0534     Neutrophil % 94.0 %      Lymphocyte % 3.0 %      Monocyte % 3.0 %      Neutrophils Absolute 27.77 10*3/mm3      Lymphocytes Absolute 0.89 10*3/mm3      Monocytes Absolute 0.89 10*3/mm3      Crenated RBC's Large/3+     Polychromasia Slight/1+     WBC Morphology Normal     Platelet Estimate Increased     Clumped Platelets Present    Lipase [[id number]]  (Abnormal) Collected:  08/31/20 0318    Specimen:  Blood Updated:  08/31/20 0421     Lipase 795 U/L     Comprehensive Metabolic Panel [[id number]]  (Abnormal) Collected:  08/31/20 0318    Specimen:  Blood Updated:  08/31/20 0413     Glucose 110 mg/dL      BUN 13 mg/dL      Creatinine 0.55 mg/dL      Sodium 133 mmol/L      Potassium 4.7 mmol/L      Chloride 99 mmol/L      CO2 25.0 mmol/L      Calcium 8.2 mg/dL      Total Protein 5.7 g/dL      Albumin 2.40 g/dL      ALT (SGPT) 104 U/L      AST (SGOT) 59 U/L      Alkaline Phosphatase 357 U/L      Total Bilirubin 0.3 mg/dL      eGFR Non African Amer 117 mL/min/1.73      Globulin 3.3 gm/dL       A/G Ratio 0.7 g/dL      BUN/Creatinine Ratio 23.6     Anion Gap 9.0 mmol/L     Narrative:       GFR Normal >60  Chronic Kidney Disease <60  Kidney Failure <15      CBC Auto Differential [[id number]]  (Abnormal) Collected:  08/31/20 0318    Specimen:  Blood Updated:  08/31/20 0353     WBC 29.54 10*3/mm3      RBC 3.32 10*6/mm3      Hemoglobin 9.9 g/dL      Hematocrit 29.8 %      MCV 89.8 fL      MCH 29.8 pg      MCHC 33.2 g/dL      RDW 13.9 %      RDW-SD 46.0 fl      MPV 8.3 fL      Platelets 570 10*3/mm3     COVID-19, [facility] IN-HOUSE,NP Swab (NO TRANSPORT MEDIA) 2 HR TAT - Swab, Nasopharynx [[id number]]  (Normal) Collected:  08/31/20 0114    Specimen:  Swab from Nasopharynx Updated:  08/31/20 0137     COVID19 Not Detected    Narrative:       Fact sheet for providers: https://www.fda.gov/media/528648/download     Fact sheet for patients: https://www.fda.gov/media/533777/download            I personally reviewed the following imaging studies and this are my impressions.      CXR: large right pleural effusion, increasing in size  CT Chest: loculated right sided pleural effusion with collapsed right lower lobe, some atelectasis on right as well    Impression: 50 yo female with loculated right paraneumonic effusion, likely PNA as inciting cause.  Sepsis and respriatory failure with tachycardia, 6L NC oxygen requirement, worsening leukocytosis.  Concern for source being the undrained effusion.      Plan:  - Plan for VATS possible open right pleural decortication and washout, bronchoscopy this afternoon; discussed with patient that there was possibility of needing open washout and a possibility of needing mechanical ventilation for longer than overnight  - Will transfer to the CT surgery service in ICU after procedure  - Discussed plan with Dr Brady  - I discussed the risks, benefits and alternatives to this procedure with patient and her . We discussed the risks and benefits of surgery now vs chest tube  drainage.  The pros and cons of each option were discussed at length.  I believe the best option for treatment is Right thoracoscopic decortication and washout, possible open washout.  The risks of the procedure were discussed to included but not limited to bleeding, infection, stroke, heart attack, anesthesia risks, need for additional procedures, great vessel injury,  mechanical ventilation, ICU stay, chronic pain syndromes, need for thoracotomy, need for prolonged chest tube use, and/or death.       Tyler Coburn M.D.  Cardiothoracic Surgeon

## 2020-09-02 NOTE — PROGRESS NOTES
[facility] Medicine Services  INPATIENT PROGRESS NOTE    Length of Stay: 3  Date of Admission: 8/30/2020  Primary Care Physician: Raegan Dubose DO    Subjective   Chief Complaint: Pneumonia/loculated pleural effusion/shortness of breath/pancreatitis/leukocytosis/fever.    HPI   T-max 101.2.  T-current 101.2.  Status post thoracentesis yesterday.  Plan for surgery by CT surgeon today.  CT scan result discussed with patient.  Patient is currently requiring 6 L of oxygen.  Patient denies any chest pain.  Patient did have a abdomen pain.    Review of Systems   Constitutional: Positive for activity change, appetite change and fatigue. Negative for chills and fever.   HENT: Negative for hearing loss, nosebleeds, tinnitus and trouble swallowing.    Eyes: Negative for visual disturbance.   Respiratory: Positive for shortness of breath. Negative for cough, chest tightness and wheezing.    Cardiovascular: Negative for chest pain, palpitations and leg swelling.   Gastrointestinal: Negative for abdominal distention, abdominal pain, blood in stool, constipation, diarrhea, nausea and vomiting.   Endocrine: Negative for cold intolerance, heat intolerance, polydipsia, polyphagia and polyuria.   Genitourinary: Negative for decreased urine volume, difficulty urinating, dysuria, flank pain, frequency and hematuria.   Musculoskeletal: Positive for arthralgias, gait problem and myalgias. Negative for joint swelling.   Skin: Negative for rash.   Allergic/Immunologic: Negative for immunocompromised state.   Neurological: Positive for weakness. Negative for dizziness, syncope, light-headedness and headaches.   Hematological: Negative for adenopathy. Does not bruise/bleed easily.   Psychiatric/Behavioral: Negative for confusion and sleep disturbance. The patient is not nervous/anxious.           All pertinent negatives and positives are as above. All other systems have been reviewed and are  negative unless otherwise stated.     Objective    Temp:  [98.4 °F (36.9 °C)-101.2 °F (38.4 °C)] 101.2 °F (38.4 °C)  Heart Rate:  [] 116  Resp:  [20-32] 22  BP: (103-134)/(47-92) 127/63    Intake/Output Summary (Last 24 hours) at 9/2/2020 0815  Last data filed at 9/2/2020 0444  Gross per 24 hour   Intake 1814.5 ml   Output 1600 ml   Net 214.5 ml     Physical Exam  Constitutional: She is oriented to person, place, and time. She appears well-developed.   HENT:   Head: Normocephalic.   Eyes: Pupils are equal, round, and reactive to light. Conjunctivae are normal.   Neck: Neck supple. No JVD present.   Cardiovascular: Normal heart sounds and intact distal pulses. Exam reveals no gallop and no friction rub.   No murmur heard.  Tachycardia 110.  Pulmonary/Chest: No respiratory distress. She has no wheezes. She has no rales. She exhibits no tenderness.   Diminished breath sound on right lung, worse in the lower.  Abdominal: Soft. Bowel sounds are normal. She exhibits no distension.  Nontender abdomen.  There is no rebound and no guarding.   Musculoskeletal: Normal range of motion. She exhibits no edema, tenderness or deformity.   Neurological: She is alert and oriented to person, place, and time. She displays normal reflexes. No cranial nerve deficit. She exhibits abnormal muscle tone. Coordination abnormal.   Skin: Skin is warm and dry. Capillary refill takes 2 to 3 seconds. No rash noted.   Psychiatric: She has a normal mood and affect. Her behavior is normal. Judgment and thought content normal.   Nursing note and vitals reviewed.  Results Review:  Lab Results (last 24 hours)     Procedure Component Value Units Date/Time    Non-gynecologic Cytology [[id number]] Collected:  09/01/20 1346    Specimen:  Body Fluid from Pleural Cavity Updated:  09/02/20 0804    Body Fluid Culture - Body Fluid, Pleural Cavity [[id number]] Collected:  09/01/20 1346    Specimen:  Body Fluid from Pleural Cavity Updated:  09/02/20 0715      Body Fluid Culture No growth     Gram Stain Many (4+) WBCs seen      No organisms seen    Respiratory Culture - Sputum, Cough [[id number]] Collected:  09/01/20 1920    Specimen:  Sputum from Cough Updated:  09/02/20 0714     Gram Stain Greater than 25 WBCs per low power field      Less than 25 Epithelial cells per low power field      Rare (1+) Mixed gram positive simran    Comprehensive Metabolic Panel [[id number]]  (Abnormal) Collected:  09/02/20 0615    Specimen:  Blood Updated:  09/02/20 0649     Glucose 102 mg/dL      BUN 5 mg/dL      Creatinine 0.29 mg/dL      Sodium 134 mmol/L      Potassium 3.5 mmol/L      Comment: Slight hemolysis detected by analyzer. Results may be affected.        Chloride 97 mmol/L      CO2 25.0 mmol/L      Calcium 7.9 mg/dL      Total Protein 5.4 g/dL      Albumin 2.00 g/dL      ALT (SGPT) 48 U/L      AST (SGOT) 39 U/L      Alkaline Phosphatase 467 U/L      Total Bilirubin 0.5 mg/dL      eGFR Non African Amer >150 mL/min/1.73      Globulin 3.4 gm/dL      A/G Ratio 0.6 g/dL      BUN/Creatinine Ratio 17.2     Anion Gap 12.0 mmol/L     Narrative:       GFR Normal >60  Chronic Kidney Disease <60  Kidney Failure <15      C-reactive Protein [[id number]]  (Abnormal) Collected:  09/02/20 0615    Specimen:  Blood Updated:  09/02/20 0644     C-Reactive Protein 33.85 mg/dL     Lipase [[id number]]  (Normal) Collected:  09/02/20 0615    Specimen:  Blood Updated:  09/02/20 0644     Lipase 58 U/L     CBC & Differential [[id number]] Collected:  09/02/20 0615    Specimen:  Blood Updated:  09/02/20 0626    Narrative:       The following orders were created for panel order CBC & Differential.  Procedure                               Abnormality         Status                     ---------                               -----------         ------                     CBC Auto Differential[[id number]]        Abnormal            Final result                 Please view results for these tests on the individual  orders.    CBC Auto Differential [[id number]]  (Abnormal) Collected:  09/02/20 0615    Specimen:  Blood Updated:  09/02/20 0626     WBC 23.17 10*3/mm3      RBC 3.24 10*6/mm3      Hemoglobin 9.8 g/dL      Hematocrit 28.3 %      MCV 87.3 fL      MCH 30.2 pg      MCHC 34.6 g/dL      RDW 13.8 %      RDW-SD 44.3 fl      MPV 8.1 fL      Platelets 615 10*3/mm3      Neutrophil % 87.4 %      Lymphocyte % 6.1 %      Monocyte % 5.0 %      Eosinophil % 0.3 %      Basophil % 0.3 %      Immature Grans % 0.9 %      Neutrophils, Absolute 20.26 10*3/mm3      Lymphocytes, Absolute 1.41 10*3/mm3      Monocytes, Absolute 1.16 10*3/mm3      Eosinophils, Absolute 0.07 10*3/mm3      Basophils, Absolute 0.06 10*3/mm3      Immature Grans, Absolute 0.21 10*3/mm3      nRBC 0.0 /100 WBC     Ammonia [[id number]]  (Normal) Collected:  09/02/20 0047    Specimen:  Blood Updated:  09/02/20 0109     Ammonia 20 umol/L     POC Glucose Once [[id number]]  (Normal) Collected:  09/02/20 0044    Specimen:  Blood Updated:  09/02/20 0045     Glucose 120 mg/dL      Comment: : [id number] Abi Galvin ID: [id number]       Blood Gas, Arterial [[id number]]  (Abnormal) Collected:  09/02/20 0030    Specimen:  Arterial Blood Updated:  09/02/20 0035     Site Right Radial     Erasmo's Test Positive     pH, Arterial 7.480 pH units      Comment: 83 Value above reference range        pCO2, Arterial 36.9 mm Hg      pO2, Arterial 70.2 mm Hg      Comment: 84 Value below reference range        HCO3, Arterial 27.5 mmol/L      Comment: 83 Value above reference range        Base Excess, Arterial 3.8 mmol/L      Comment: 83 Value above reference range        O2 Saturation, Arterial 95.3 %      Temperature 37.0 C      Barometric Pressure for Blood Gas 746 mmHg      Modality HFNC     Flow Rate 8.0 lpm      Ventilator Mode NA     Collected by [id number]     Comment: Meter: [id number]     :  [id number]        pCO2, Temperature Corrected 36.9 mm Hg      pH, Temp  Corrected 7.480 pH Units      pO2, Temperature Corrected 70.2 mm Hg     Lipase, Body Fluid - Pleural Fluid, Pleural Cavity [[id number]] Collected:  09/01/20 1347    Specimen:  Pleural Fluid from Pleural Cavity Updated:  09/01/20 1407    Amylase, Body Fluid - Pleural Fluid, Pleural Cavity [[id number]] Collected:  09/01/20 1347    Specimen:  Pleural Fluid from Pleural Cavity Updated:  09/01/20 1407    Glucose, Body Fluid - Pleural Fluid, Pleural Cavity [[id number]] Collected:  09/01/20 1347    Specimen:  Pleural Fluid from Pleural Cavity Updated:  09/01/20 1407    Blood Culture With KENISHA - Blood, Arm, Right [[id number]] Collected:  08/31/20 0638    Specimen:  Blood from Arm, Right Updated:  09/01/20 0900     Blood Culture No growth at 24 hours    Blood Culture With KENISHA - Blood, Hand, Right [[id number]] Collected:  08/31/20 0614    Specimen:  Blood from Hand, Right Updated:  09/01/20 0900     Blood Culture No growth at 24 hours    T4, Free [[id number]]  (Normal) Collected:  09/01/20 0241    Specimen:  Blood Updated:  09/01/20 0840     Free T4 1.50 ng/dL     Narrative:       Results may be falsely increased if patient taking Biotin.             Cultures:  Blood Culture   Date Value Ref Range Status   08/31/2020 No growth at 24 hours  Preliminary   08/31/2020 No growth at 24 hours  Preliminary       Radiology Data:    Imaging Results (Last 24 Hours)     Procedure Component Value Units Date/Time    CT Head Without Contrast [[id number]] Collected:  09/02/20 0722     Updated:  09/02/20 0727    Narrative:       CT HEAD WO CONTRAST- 9/2/2020 12:54 AM CDT     HISTORY: Confusion/delirium, altered LOC, unexplained      COMPARISON: None     DOSE LENGTH PRODUCT: 595 mGy cm. Automated exposure control was also  utilized to decrease patient radiation dose.     TECHNIQUE: Axial images of brain obtained without contrast.     FINDINGS:  Ventricles are normal in size and configuration. No  intracranial hemorrhage or mass effect. No acute signs  of ischemia.  Physiologic calcifications of the basal ganglia. No extra-axial hematoma  or subarachnoid hemorrhage. Cerebellar tonsils position above the  foramen magnum. No sellar mass.     No air-fluid level seen within the paranasal sinuses. Mastoid air cells  are well-aerated. Bony calvarium appears intact.       Impression:       1. No acute intracranial abnormality.     Comments: A preliminary report is issued to the ER by the [facility]  radiology service. I agree with this preliminary impression.  This report was finalized on 09/02/2020 07:24 by Dr. Nikki Gordon MD.    CT Chest Without Contrast [[id number]] Collected:  09/01/20 1738     Updated:  09/01/20 1747    Narrative:       CT CHEST WO CONTRAST- 9/1/2020 5:14 PM CDT     HISTORY: Pleural effusion      COMPARISON: Chest x-ray 9/1/2020     DOSE LENGTH PRODUCT: 372 mGy cm. Automated exposure control was also  utilized to decrease patient radiation dose.     TECHNIQUE: Axial images the chest are obtained without contrast.     FINDINGS:  There is a moderate size loculated right pleural fluid  collection. There is fluid present within the right major fissure. There  is a very small left pleural effusion and a small pericardial effusion.  Reactive mediastinal lymph nodes are visualized. No pathologic axillary  lymphadenopathy. Small hiatal hernia.     Images the upper abdomen do not include visualization adrenal glands.     There are scattered patchy bilateral pulmonary opacities. There is  compressed trapped right lower lobe parenchyma with partial collapse of  the right middle lobe associated with the effusion. There is no  pneumothorax. No discrete endobronchial lesions.     There is a right convexity to the thoracic curvature. No discrete focal  bony lesions are visualized.       Impression:       1. Moderate lobulated/loculated right pleural effusion with loculated  fluid in the right major fissure. Scattered bilateral patchy pulmonary  opacities  representing multifocal pneumonia. Trapped portions of the  right lower lobe with compressive atelectasis of right middle and lower  lobes. Reactive mediastinal lymph nodes.  This report was finalized on 09/01/2020 17:43 by Dr. Nikki Gordon MD.    XR Chest 1 View [[id number]] Collected:  09/01/20 1448     Updated:  09/01/20 1452    Narrative:       Frontal upright radiograph of the chest 9/1/2020 2:42 PM CDT     COMPARISON: August 31, 2020     HISTORY: Postthoracentesis.     FINDINGS:   Patchy infiltrates are present in the periphery of the left lung. Large  right pleural effusion is present. There is no pneumothorax.. The  cardiomediastinal silhouette and pulmonary vascularity are within normal  limits.      The osseous structures and surrounding soft tissues demonstrate no acute  abnormality.       Impression:       1. Very large right pleural effusion. There is no pneumothorax.        2. Patchy infiltrates in the left lung..        This report was finalized on 09/01/2020 14:49 by Dr. Nakul Amaro MD.    US Chest [[id number]] Collected:  09/01/20 1422     Updated:  09/01/20 1426    Narrative:       EXAMINATION:   US CHEST-  9/1/2020 2:22 PM CDT     HISTORY: Thoracentesis     3 images were obtained assisting Dr. Coburn during a thoracentesis  This report was finalized on 09/01/2020 14:23 by Dr. Nakul Amaro MD.          No Known Allergies    Scheduled meds:     amitriptyline 150 mg Oral Nightly   estrogens (conjugated) 0.3 mg Oral Daily   famotidine 20 mg Intravenous BID   levothyroxine 112 mcg Oral Q AM   metoprolol tartrate 50 mg Oral Q12H   piperacillin-tazobactam 3.375 g Intravenous Q8H   sodium chloride 10 mL Intravenous Q12H   vancomycin 1,250 mg Intravenous Q12H       PRN meds:  •  acetaminophen **OR** acetaminophen  •  benzonatate  •  cyclobenzaprine  •  HYDROcod Polst-CPM Polst ER  •  ondansetron  •  sodium chloride    Assessment/Plan       Acute pancreatitis    Pneumonitis    Pleural effusion on  right    Elevated liver enzymes      Plan:    Right loculated pleural effusion/pneumonia/right pneumonitis/coughing.  Continue Zosyn.  Vancomycin was started.  Tussionex as needed. Cont CT surgeon.  Atrovent nebs.  Incentive spirometer.  CT scan of the chest-Moderate lobulated/loculated right pleural effusion with loculated  fluid in the right major fissure, scattered bilateral patchy pulmonary opacities representing multifocal pneumonia, trapped portions of the right lower lobe with compressive atelectasis of right middle and lower lobes, reactive mediastinal lymph nodes.  Status post thoracentesis 2020.   Plan for surgery/chest tube today by Dr. Coburn.    Acute pancreatitis.  Lipase is improving.  GI has been consulted.  CT scan abdomen pelvic 2020 at [facility]- mild acute pancreatitis-no suggestion of pancreatic necrosis or pseudocyst formation, cystocele, status post hysterectomy and bilateral salpingo-oophorectomy    Elevated liver enzyme/gallbladder sludge.  Liver enzyme is improving.  Acute hepatitis panel- neg. Nontender right upper quadrant.  Ultrasound of the liver-Sludge is present in the gallbladder, liver is unremarkable, hepatopedal flow is present in the portal vein.     Hypertension/tachycardia. Cont Lopressor.    Echocardiogram-ejection fraction 60-65%, mild mitral valve regurgitation.      Depression.  Elavil at night.     Postmenopausal.  Premarin.      Hypothyroidism.  Continue Synthroid.     Pain control.  Dilaudid as needed.  Flexeril as needed. Percocet PRN.     Nausea.  Zofran PRN.  Pepcid.     Nutrition.  Advance diet full liquid diet.     Blood cultures-no growth 24 hours.  Body fluid culture-no growth. COVID-19-negative.  Respiratory PCR-negative.  Respiratory culture mixed gram-positive simran.  Cytology is pending.  Will get blood culture result from [facility] today.     Discharge Plannin-5 days.  Transfer from [facility].    Electronically signed by Randolph MALDONADO  MD Garth, 09/02/20, 8:15 AM.

## 2020-09-02 NOTE — PLAN OF CARE
Problem: Patient Care Overview  Goal: Plan of Care Review  Outcome: Ongoing (interventions implemented as appropriate)  Flowsheets (Taken 9/1/2020 1900)  Progress: no change  Plan of Care Reviewed With: patient; spouse  Note:   Pt continues on high flow nasal cannula currently at 6L. Bedside thoracentesis on the right fluid obtained and sent to lab results pending. See mar for meds. Up to bedside commode. Good urine output. Tolerating advanced diet to low fat diet. Metoprolol added heartrate improved.

## 2020-09-02 NOTE — ANESTHESIA PROCEDURE NOTES
Airway  Urgency: elective    Date/Time: 9/2/2020 3:32 PM  Airway not difficult    General Information and Staff    Patient location during procedure: OR  CRNA: Michael Ellis CRNA    Indications and Patient Condition  Indications for airway management: airway protection    Preoxygenated: yes  MILS not maintained throughout  Mask difficulty assessment: 1 - vent by mask    Final Airway Details  Final airway type: endotracheal airway      Successful airway: ETT  Cuffed: yes   Successful intubation technique: direct laryngoscopy  Facilitating devices/methods: intubating stylet  Endotracheal tube insertion site: oral  Blade: Roberts  Blade size: 2  ETT size (mm): 8.0  Cormack-Lehane Classification: grade I - full view of glottis  Placement verified by: chest auscultation and capnometry   Measured from: lips  ETT/EBT  to lips (cm): 22  Number of attempts at approach: 1  Assessment: lips, teeth, and gum same as pre-op and atraumatic intubation

## 2020-09-02 NOTE — PROGRESS NOTES
[facility] Gastroenterology  Inpatient Progress Note  Today's date:  09/02/20    Heidi Fernandez  1970       Reason for Follow Up: Acute pancreatitis    Subjective: Patient sitting up in bed this morning. The patient denies any nausea, vomiting, epigastric pain, dysphagia, pyrosis or hematemesis.  The patient denies any fever or chills.  Denies any melena or hematochezia.  Denies any unintentional weight loss or loss of appetite.  She tells me she is to be taken to surgery today for cardiothoracic due right lung infection.      No Known Allergies    Current Facility-Administered Medications:   •  acetaminophen (TYLENOL) tablet 650 mg, 650 mg, Oral, Q4H PRN, 650 mg at 09/01/20 0711 **OR** acetaminophen (TYLENOL) suppository 650 mg, 650 mg, Rectal, Q4H PRN, Rick Corado DO, 650 mg at 08/31/20 0842  •  albuterol (PROVENTIL) nebulizer solution 0.042% 1.25 mg/3mL, 1.25 mg, Nebulization, 4x Daily - RT, Odalis Marie, ANA  •  amitriptyline (ELAVIL) tablet 150 mg, 150 mg, Oral, Nightly, Rick Corado DO, 150 mg at 09/01/20 2020  •  benzonatate (TESSALON) capsule 100 mg, 100 mg, Oral, TID PRN, Rick Corado DO, 100 mg at 08/31/20 2318  •  ceFAZolin in 0.9% normal saline (ANCEF) IVPB solution 2 g, 2 g, Intravenous, Once, Odalis Marie, ANA  •  cyclobenzaprine (FLEXERIL) tablet 5 mg, 5 mg, Oral, TID PRN, Rick Corado DO  •  estrogens (conjugated) (PREMARIN) tablet 0.3 mg, 0.3 mg, Oral, Daily, Rick Corado DO, 0.3 mg at 09/02/20 0915  •  famotidine (PEPCID) injection 20 mg, 20 mg, Intravenous, BID, Randolph Liang MD, 20 mg at 09/02/20 0917  •  HYDROcod Polst-CPM Polst ER (TUSSIONEX PENNKINETIC) 10-8 MG/5ML ER suspension 5 mL, 5 mL, Oral, Q12H PRN, Rick Corado, , 5 mL at 08/31/20 1821  •  ipratropium (ATROVENT) nebulizer solution 0.5 mg, 0.5 mg, Nebulization, 4x Daily - RT, Randolph Liang MD  •  levothyroxine (SYNTHROID, LEVOTHROID) tablet 112 mcg, 112 mcg, Oral, Q  AM, Rick Corado DO, 112 mcg at 09/02/20 0600  •  metoprolol tartrate (LOPRESSOR) tablet 50 mg, 50 mg, Oral, Q12H, Randolph Liang MD, 50 mg at 09/02/20 0915  •  ondansetron (ZOFRAN) injection 4 mg, 4 mg, Intravenous, Q6H PRN, Rick Corado DO  •  piperacillin-tazobactam (ZOSYN) 3.375 g in iso-osmotic dextrose 50 ml (premix), 3.375 g, Intravenous, Q8H, Randolph Liang MD, Last Rate: 0 mL/hr at 09/01/20 2100, 3.375 g at 09/02/20 0915  •  sodium chloride 0.9 % flush 10 mL, 10 mL, Intravenous, Q12H, Rick Corado DO, 10 mL at 09/02/20 0916  •  sodium chloride 0.9 % flush 10 mL, 10 mL, Intravenous, PRN, Rick Corado DO  •  vancomycin 1250 mg/250 mL 0.9% NS IVPB ([facility]), 1,250 mg, Intravenous, Q12H, Randolph Liang MD, 1,250 mg at 09/02/20 0933    Review of Systems:   Review of Systems   Constitutional: Negative for fever and unexpected weight change.   HENT: Negative for hearing loss.    Eyes: Negative for visual disturbance.   Respiratory: Negative for cough.    Cardiovascular: Negative for chest pain.   Gastrointestinal:        See HPI   Endocrine: Negative for cold intolerance and heat intolerance.   Genitourinary: Negative for dysuria.   Musculoskeletal: Negative for arthralgias.   Skin: Negative for rash.   Neurological: Negative for seizures.   Psychiatric/Behavioral: Negative for hallucinations.        Vital Signs:  Temp:  [98.4 °F (36.9 °C)-101.4 °F (38.6 °C)] 99.6 °F (37.6 °C)  Heart Rate:  [] 96  Resp:  [20-32] 27  BP: (103-134)/(47-92) 116/89  Body mass index is 24.46 kg/m².     Intake/Output Summary (Last 24 hours) at 9/2/2020 1137  Last data filed at 9/2/2020 0800  Gross per 24 hour   Intake 1575.5 ml   Output 1600 ml   Net -24.5 ml     I/O this shift:  In: 101 [I.V.:101]  Out: -   Physical Exam:  Physical Exam   Constitutional: She appears well-developed.   Cardiovascular: Normal rate and regular rhythm.   Pulmonary/Chest: Effort normal.   None in right lung, decreased left     Abdominal: Soft. Bowel sounds are normal.   Neurological: She is alert.   Psychiatric: She has a normal mood and affect.        Results Review:   I have reviewed all of the patient's current test results    Results from last 7 days   Lab Units 09/02/20  0615 09/01/20  0241 08/31/20 0318   WBC 10*3/mm3 23.17* 17.45* 29.54*   HEMOGLOBIN g/dL 9.8* 9.9* 9.9*   HEMATOCRIT % 28.3* 30.1* 29.8*   PLATELETS 10*3/mm3 615* 611* 570*       Results from last 7 days   Lab Units 09/02/20  0615 09/01/20  0241 08/31/20 0318   SODIUM mmol/L 134* 134* 133*   POTASSIUM mmol/L 3.5 3.6 4.7   CHLORIDE mmol/L 97* 97* 99   CO2 mmol/L 25.0 25.0 25.0   BUN mg/dL 5* 11 13   CREATININE mg/dL 0.29* 0.43* 0.55*   CALCIUM mg/dL 7.9* 8.0* 8.2*   BILIRUBIN mg/dL 0.5 0.5 0.3   ALK PHOS U/L 467* 392* 357*   ALT (SGPT) U/L 48* 70* 104*   AST (SGOT) U/L 39* 37* 59*   GLUCOSE mg/dL 102* 95 110*       Results from last 7 days   Lab Units 09/02/20  0930   INR  1.22*       Lab Results   Lab Value Date/Time    LIPASE 58 09/02/2020 0615    LIPASE 195 (H) 09/01/2020 0241    LIPASE 795 (H) 08/31/2020 0318       Radiology Review:  Imaging Results (Last 24 Hours)     Procedure Component Value Units Date/Time    CT Head Without Contrast [[id number]] Collected:  09/02/20 0722     Updated:  09/02/20 0727    Narrative:       CT HEAD WO CONTRAST- 9/2/2020 12:54 AM CDT     HISTORY: Confusion/delirium, altered LOC, unexplained      COMPARISON: None     DOSE LENGTH PRODUCT: 595 mGy cm. Automated exposure control was also  utilized to decrease patient radiation dose.     TECHNIQUE: Axial images of brain obtained without contrast.     FINDINGS:  Ventricles are normal in size and configuration. No  intracranial hemorrhage or mass effect. No acute signs of ischemia.  Physiologic calcifications of the basal ganglia. No extra-axial hematoma  or subarachnoid hemorrhage. Cerebellar tonsils position above the  foramen magnum. No sellar mass.     No air-fluid level seen within  the paranasal sinuses. Mastoid air cells  are well-aerated. Bony calvarium appears intact.       Impression:       1. No acute intracranial abnormality.     Comments: A preliminary report is issued to the ER by the [facility]  radiology service. I agree with this preliminary impression.  This report was finalized on 09/02/2020 07:24 by Dr. Nikki Gordon MD.    CT Chest Without Contrast [[id number]] Collected:  09/01/20 1738     Updated:  09/01/20 1747    Narrative:       CT CHEST WO CONTRAST- 9/1/2020 5:14 PM CDT     HISTORY: Pleural effusion      COMPARISON: Chest x-ray 9/1/2020     DOSE LENGTH PRODUCT: 372 mGy cm. Automated exposure control was also  utilized to decrease patient radiation dose.     TECHNIQUE: Axial images the chest are obtained without contrast.     FINDINGS:  There is a moderate size loculated right pleural fluid  collection. There is fluid present within the right major fissure. There  is a very small left pleural effusion and a small pericardial effusion.  Reactive mediastinal lymph nodes are visualized. No pathologic axillary  lymphadenopathy. Small hiatal hernia.     Images the upper abdomen do not include visualization adrenal glands.     There are scattered patchy bilateral pulmonary opacities. There is  compressed trapped right lower lobe parenchyma with partial collapse of  the right middle lobe associated with the effusion. There is no  pneumothorax. No discrete endobronchial lesions.     There is a right convexity to the thoracic curvature. No discrete focal  bony lesions are visualized.       Impression:       1. Moderate lobulated/loculated right pleural effusion with loculated  fluid in the right major fissure. Scattered bilateral patchy pulmonary  opacities representing multifocal pneumonia. Trapped portions of the  right lower lobe with compressive atelectasis of right middle and lower  lobes. Reactive mediastinal lymph nodes.  This report was finalized on 09/01/2020 17:43 by   Nikki Gordon MD.    XR Chest 1 View [[id number]] Collected:  09/01/20 1448     Updated:  09/01/20 1452    Narrative:       Frontal upright radiograph of the chest 9/1/2020 2:42 PM CDT     COMPARISON: August 31, 2020     HISTORY: Postthoracentesis.     FINDINGS:   Patchy infiltrates are present in the periphery of the left lung. Large  right pleural effusion is present. There is no pneumothorax.. The  cardiomediastinal silhouette and pulmonary vascularity are within normal  limits.      The osseous structures and surrounding soft tissues demonstrate no acute  abnormality.       Impression:       1. Very large right pleural effusion. There is no pneumothorax.        2. Patchy infiltrates in the left lung..        This report was finalized on 09/01/2020 14:49 by Dr. Nakul Amaro MD.    US Chest [[id number]] Collected:  09/01/20 1422     Updated:  09/01/20 1426    Narrative:       EXAMINATION:   US CHEST-  9/1/2020 2:22 PM CDT     HISTORY: Thoracentesis     3 images were obtained assisting Dr. Coburn during a thoracentesis  This report was finalized on 09/01/2020 14:23 by Dr. Nakul Amaro MD.          Impression/Plan:  Patient Active Problem List   Diagnosis Code   • Colon cancer screening Z12.11   • Acute pancreatitis K85.90   • Pneumonitis J18.9   • Pleural effusion on right J90   • Elevated liver enzymes R74.8     Acute pancreatitis with elevated liver injury numbers.  This is her first episode.  CT imaging at outside institution does not show any pancreatic mass.  Ultrasound of liver revealed sludge present in gallbladder.  Liver unremarkable.   She has been on low fat diet.  Lipase within normal limits today.  Liver enzymes continue to trend downward.  OK to cont low fat diet.  My office will arrange follow up re: EUS post discharge.  GI is signing off.    ANA Miguel  09/02/20  11:37     Josefina Almonte MD  [facility] Gastroenterology  09/02/20  14:07

## 2020-09-02 NOTE — TELEPHONE ENCOUNTER
Patient will need an office visit with Rubina in about 4 weeks to make arrangements for endoscopic ultrasound.  She is currently in the hospital will probably be there through the rest of the week.    Josefina Almonte MD

## 2020-09-02 NOTE — PROGRESS NOTES
"Pharmacy Dosing Service  Pharmacokinetics  Vancomycin Initial Evaluation    Assessment/Action/Plan:  Pharmacy to dose Vancomycin x 7 days, requested by Randolph Liang MD  Indication: pneumonia    Per physician notes: Chest x-ray-right-sided pneumonia with layering right pleural effusion    Patient has been on Doxycycline and Zosyn therapy, will now transition to vancomycin from doxycycline.     Reviewed H&P, no prior kidney issues.     Patient continues on Zosyn therapy. MRSA nasal swab ordered.     Pharmacokinetic prediction summary:  Regimen: 1250 mg every 12 hours for 7 days  Start time: 0830 on 09/02/2020  Exposure target: AUC24 (range)400-600 mg/L.hr  AUC24,ss: 461 mg/L.hr  PAUC*: 65 %  Ctrough,ss: 12.7 mg/L  Pconc*: 19 %  Tox.: 8 %    Initiated Vancomycin 1250 mg IVPB every 12 hours. Vancomycin levels not ordered at this time..  Current vancomycin end date: final dose 9/8/2020 at 2030. Pharmacy will monitor renal function and adjust dose accordingly.     Subjective:  Heidi Fernandez is a 49 y.o. female with a Vancomycin \"Pharmacy to Dose\" consult for the treatment of pneumonia .    Past Medical History:   Diagnosis Date    Anemia     Cyst, ovary, follicular     Hypothyroid     Lichen sclerosus     Migraine      Objective:  Ht: 170.2 cm (67\"); Wt: 70.9 kg (156 lb 3.2 oz)  Estimated Creatinine Clearance: 256.9 mL/min (A) (by C-G formula based on SCr of 0.29 mg/dL (L)).   Lab Results   Component Value Date    CREATININE 0.29 (L) 09/02/2020    CREATININE 0.43 (L) 09/01/2020    CREATININE 0.55 (L) 08/31/2020      Lab Results   Component Value Date    WBC 23.17 (H) 09/02/2020    WBC 17.45 (H) 09/01/2020    WBC 29.54 (H) 08/31/2020      Baseline culture results:  Microbiology Results (last 10 days)       Procedure Component Value - Date/Time    Respiratory Culture - Sputum, Cough [[id number]] Collected:  09/01/20 1920    Lab Status:  Preliminary result Specimen:  Sputum from Cough Updated:  09/02/20 0714     Gram " Stain Greater than 25 WBCs per low power field      Less than 25 Epithelial cells per low power field      Rare (1+) Mixed gram positive simran    Body Fluid Culture - Body Fluid, Pleural Cavity [[id number]] Collected:  09/01/20 1346    Lab Status:  Preliminary result Specimen:  Body Fluid from Pleural Cavity Updated:  09/02/20 0715     Body Fluid Culture No growth     Gram Stain Many (4+) WBCs seen      No organisms seen    COVID PRE-OP / PRE-PROCEDURE SCREENING ORDER (NO ISOLATION) - Swab, Nasopharynx [[id number]] Collected:  08/31/20 1029    Lab Status:  Final result Specimen:  Swab from Nasopharynx Updated:  08/31/20 1128    Narrative:       The following orders were created for panel order COVID PRE-OP / PRE-PROCEDURE SCREENING ORDER (NO ISOLATION) - Swab, Nasopharynx.  Procedure                               Abnormality         Status                     ---------                               -----------         ------                     Respiratory Panel PCR w/...[[id number]]  Normal              Final result                 Please view results for these tests on the individual orders.    Respiratory Panel PCR w/COVID-19(SARS-CoV-2) [facility] In-House, NP Swab in UTM/VTM, 3-4 HR TAT - Swab, Nasopharynx [[id number]]  (Normal) Collected:  08/31/20 1029    Lab Status:  Final result Specimen:  Swab from Nasopharynx Updated:  08/31/20 1128     ADENOVIRUS, PCR Not Detected     Coronavirus 229E Not Detected     Coronavirus HKU1 Not Detected     Coronavirus NL63 Not Detected     Coronavirus OC43 Not Detected     COVID19 Not Detected     Human Metapneumovirus Not Detected     Human Rhinovirus/Enterovirus Not Detected     Influenza A PCR Not Detected     Influenza A H1 Not Detected     Influenza A H1 2009 PCR Not Detected     Influenza A H3 Not Detected     Influenza B PCR Not Detected     Parainfluenza Virus 1 Not Detected     Parainfluenza Virus 2 Not Detected     Parainfluenza Virus 3 Not Detected      Parainfluenza Virus 4 Not Detected     RSV, PCR Not Detected     Bordetella pertussis pcr Not Detected     Bordetella parapertussis PCR Not Detected     Chlamydophila pneumoniae PCR Not Detected     Mycoplasma pneumo by PCR Not Detected    Narrative:       Fact sheet for providers: https://docs.[employer]/wp-content/uploads/MRX5233-4427-TK7.1-EUA-Provider-Fact-Sheet-3.pdf    Fact sheet for patients: https://docs.[employer]/wp-content/uploads/HMN9120-8799-ZA1.1-EUA-Patient-Fact-Sheet-1.pdf    Blood Culture With KENISHA - Blood, Arm, Right [[id number]] Collected:  08/31/20 0638    Lab Status:  Preliminary result Specimen:  Blood from Arm, Right Updated:  09/01/20 0900     Blood Culture No growth at 24 hours    Blood Culture With KENISHA - Blood, Hand, Right [[id number]] Collected:  08/31/20 0614    Lab Status:  Preliminary result Specimen:  Blood from Hand, Right Updated:  09/01/20 0900     Blood Culture No growth at 24 hours    COVID-19, [facility] IN-HOUSE,NP Swab (NO TRANSPORT MEDIA) 2 HR TAT - Swab, Nasopharynx [[id number]]  (Normal) Collected:  08/31/20 0114    Lab Status:  Final result Specimen:  Swab from Nasopharynx Updated:  08/31/20 0137     COVID19 Not Detected    Narrative:       Fact sheet for providers: https://www.fda.gov/media/906007/download     Fact sheet for patients: https://www.fda.gov/media/984587/download            Shukri Klein, [place]  09/02/20 07:38

## 2020-09-03 ENCOUNTER — APPOINTMENT (OUTPATIENT)
Dept: GENERAL RADIOLOGY | Facility: HOSPITAL | Age: 50
End: 2020-09-03

## 2020-09-03 PROBLEM — J86.9 EMPYEMA, RIGHT: Status: ACTIVE | Noted: 2020-09-03

## 2020-09-03 PROBLEM — D72.829 LEUKOCYTOSIS: Status: ACTIVE | Noted: 2020-09-03

## 2020-09-03 LAB
ALBUMIN SERPL-MCNC: 2.3 G/DL (ref 3.5–5.2)
ALBUMIN/GLOB SERPL: 0.8 G/DL
ALP SERPL-CCNC: 456 U/L (ref 39–117)
ALT SERPL W P-5'-P-CCNC: 43 U/L (ref 1–33)
ANION GAP SERPL CALCULATED.3IONS-SCNC: 14 MMOL/L (ref 5–15)
AST SERPL-CCNC: 40 U/L (ref 1–32)
BASOPHILS # BLD AUTO: 0.04 10*3/MM3 (ref 0–0.2)
BASOPHILS NFR BLD AUTO: 0.2 % (ref 0–1.5)
BILIRUB SERPL-MCNC: 0.5 MG/DL (ref 0–1.2)
BUN SERPL-MCNC: 7 MG/DL (ref 6–20)
BUN/CREAT SERPL: 30.4 (ref 7–25)
CALCIUM SPEC-SCNC: 7.7 MG/DL (ref 8.6–10.5)
CHLORIDE SERPL-SCNC: 98 MMOL/L (ref 98–107)
CO2 SERPL-SCNC: 26 MMOL/L (ref 22–29)
CONV LIPASE, FLUID: 249 U/L
CREAT SERPL-MCNC: 0.23 MG/DL (ref 0.57–1)
CYTO UR: NORMAL
DEPRECATED RDW RBC AUTO: 44.5 FL (ref 37–54)
EOSINOPHIL # BLD AUTO: 0 10*3/MM3 (ref 0–0.4)
EOSINOPHIL NFR BLD AUTO: 0 % (ref 0.3–6.2)
ERYTHROCYTE [DISTWIDTH] IN BLOOD BY AUTOMATED COUNT: 13.8 % (ref 12.3–15.4)
GFR SERPL CREATININE-BSD FRML MDRD: >150 ML/MIN/1.73
GLOBULIN UR ELPH-MCNC: 2.9 GM/DL
GLUCOSE SERPL-MCNC: 153 MG/DL (ref 65–99)
HCT VFR BLD AUTO: 27 % (ref 34–46.6)
HGB BLD-MCNC: 9.1 G/DL (ref 12–15.9)
IMM GRANULOCYTES # BLD AUTO: 0.2 10*3/MM3 (ref 0–0.05)
IMM GRANULOCYTES NFR BLD AUTO: 1 % (ref 0–0.5)
LAB AP CASE REPORT: NORMAL
LYMPHOCYTES # BLD AUTO: 0.51 10*3/MM3 (ref 0.7–3.1)
LYMPHOCYTES NFR BLD AUTO: 2.6 % (ref 19.6–45.3)
MCH RBC QN AUTO: 29.7 PG (ref 26.6–33)
MCHC RBC AUTO-ENTMCNC: 33.7 G/DL (ref 31.5–35.7)
MCV RBC AUTO: 88.2 FL (ref 79–97)
MONOCYTES # BLD AUTO: 0.55 10*3/MM3 (ref 0.1–0.9)
MONOCYTES NFR BLD AUTO: 2.8 % (ref 5–12)
NEUTROPHILS NFR BLD AUTO: 18.2 10*3/MM3 (ref 1.7–7)
NEUTROPHILS NFR BLD AUTO: 93.4 % (ref 42.7–76)
NRBC BLD AUTO-RTO: 0 /100 WBC (ref 0–0.2)
PATH REPORT.FINAL DX SPEC: NORMAL
PATH REPORT.GROSS SPEC: NORMAL
PLATELET # BLD AUTO: 642 10*3/MM3 (ref 140–450)
PMV BLD AUTO: 8.3 FL (ref 6–12)
POTASSIUM SERPL-SCNC: 3.6 MMOL/L (ref 3.5–5.2)
PROT SERPL-MCNC: 5.2 G/DL (ref 6–8.5)
RBC # BLD AUTO: 3.06 10*6/MM3 (ref 3.77–5.28)
SODIUM SERPL-SCNC: 138 MMOL/L (ref 136–145)
WBC # BLD AUTO: 19.5 10*3/MM3 (ref 3.4–10.8)

## 2020-09-03 PROCEDURE — 25010000002 MORPHINE SULFATE (PF) 2 MG/ML SOLUTION: Performed by: SURGERY

## 2020-09-03 PROCEDURE — 25010000002 ENOXAPARIN PER 10 MG: Performed by: SURGERY

## 2020-09-03 PROCEDURE — 71045 X-RAY EXAM CHEST 1 VIEW: CPT

## 2020-09-03 PROCEDURE — 25010000002 FUROSEMIDE PER 20 MG: Performed by: FAMILY MEDICINE

## 2020-09-03 PROCEDURE — 25010000002 PIPERACILLIN SOD-TAZOBACTAM PER 1 G: Performed by: SURGERY

## 2020-09-03 PROCEDURE — 25010000002 FUROSEMIDE PER 20 MG: Performed by: NURSE PRACTITIONER

## 2020-09-03 PROCEDURE — 94799 UNLISTED PULMONARY SVC/PX: CPT

## 2020-09-03 PROCEDURE — 25010000002 PIPERACILLIN SOD-TAZOBACTAM PER 1 G: Performed by: FAMILY MEDICINE

## 2020-09-03 PROCEDURE — 80053 COMPREHEN METABOLIC PANEL: CPT | Performed by: SURGERY

## 2020-09-03 PROCEDURE — 85025 COMPLETE CBC W/AUTO DIFF WBC: CPT | Performed by: SURGERY

## 2020-09-03 PROCEDURE — 99024 POSTOP FOLLOW-UP VISIT: CPT | Performed by: NURSE PRACTITIONER

## 2020-09-03 PROCEDURE — 25010000002 VANCOMYCIN 10 G RECONSTITUTED SOLUTION: Performed by: SURGERY

## 2020-09-03 PROCEDURE — 25010000002 VANCOMYCIN 10 G RECONSTITUTED SOLUTION: Performed by: FAMILY MEDICINE

## 2020-09-03 PROCEDURE — 25010000002 MORPHINE SULFATE (PF) 2 MG/ML SOLUTION: Performed by: NURSE PRACTITIONER

## 2020-09-03 RX ORDER — LIDOCAINE 50 MG/G
2 PATCH TOPICAL
Status: DISCONTINUED | OUTPATIENT
Start: 2020-09-03 | End: 2020-09-07 | Stop reason: HOSPADM

## 2020-09-03 RX ORDER — METHOCARBAMOL 500 MG/1
500 TABLET, FILM COATED ORAL 3 TIMES DAILY
Status: DISCONTINUED | OUTPATIENT
Start: 2020-09-03 | End: 2020-09-07 | Stop reason: HOSPADM

## 2020-09-03 RX ORDER — FAMOTIDINE 20 MG/1
20 TABLET, FILM COATED ORAL
Status: DISCONTINUED | OUTPATIENT
Start: 2020-09-03 | End: 2020-09-07 | Stop reason: HOSPADM

## 2020-09-03 RX ORDER — MORPHINE SULFATE 2 MG/ML
1 INJECTION, SOLUTION INTRAMUSCULAR; INTRAVENOUS EVERY 4 HOURS PRN
Status: DISCONTINUED | OUTPATIENT
Start: 2020-09-03 | End: 2020-09-07 | Stop reason: HOSPADM

## 2020-09-03 RX ORDER — POLYETHYLENE GLYCOL 3350 17 G/17G
17 POWDER, FOR SOLUTION ORAL DAILY
Status: DISCONTINUED | OUTPATIENT
Start: 2020-09-03 | End: 2020-09-07 | Stop reason: HOSPADM

## 2020-09-03 RX ORDER — FUROSEMIDE 10 MG/ML
40 INJECTION INTRAMUSCULAR; INTRAVENOUS ONCE
Status: COMPLETED | OUTPATIENT
Start: 2020-09-03 | End: 2020-09-03

## 2020-09-03 RX ORDER — FUROSEMIDE 10 MG/ML
20 INJECTION INTRAMUSCULAR; INTRAVENOUS
Status: DISCONTINUED | OUTPATIENT
Start: 2020-09-03 | End: 2020-09-04

## 2020-09-03 RX ORDER — NALOXONE HCL 0.4 MG/ML
0.4 VIAL (ML) INJECTION
Status: DISCONTINUED | OUTPATIENT
Start: 2020-09-03 | End: 2020-09-07 | Stop reason: HOSPADM

## 2020-09-03 RX ORDER — POTASSIUM CHLORIDE 750 MG/1
20 CAPSULE, EXTENDED RELEASE ORAL 2 TIMES DAILY WITH MEALS
Status: DISCONTINUED | OUTPATIENT
Start: 2020-09-03 | End: 2020-09-04

## 2020-09-03 RX ORDER — HYDROCODONE BITARTRATE AND ACETAMINOPHEN 5; 325 MG/1; MG/1
1 TABLET ORAL EVERY 6 HOURS PRN
Status: DISCONTINUED | OUTPATIENT
Start: 2020-09-03 | End: 2020-09-07 | Stop reason: HOSPADM

## 2020-09-03 RX ADMIN — FAMOTIDINE 20 MG: 10 INJECTION INTRAVENOUS at 08:00

## 2020-09-03 RX ADMIN — IPRATROPIUM BROMIDE 0.5 MG: 0.5 SOLUTION RESPIRATORY (INHALATION) at 06:58

## 2020-09-03 RX ADMIN — METHOCARBAMOL 500 MG: 500 TABLET ORAL at 09:28

## 2020-09-03 RX ADMIN — IPRATROPIUM BROMIDE 0.5 MG: 0.5 SOLUTION RESPIRATORY (INHALATION) at 19:36

## 2020-09-03 RX ADMIN — TAZOBACTAM SODIUM AND PIPERACILLIN SODIUM 3.38 G: 375; 3 INJECTION, SOLUTION INTRAVENOUS at 08:01

## 2020-09-03 RX ADMIN — SODIUM CHLORIDE, POTASSIUM CHLORIDE, SODIUM LACTATE AND CALCIUM CHLORIDE 100 ML/HR: 600; 310; 30; 20 INJECTION, SOLUTION INTRAVENOUS at 06:13

## 2020-09-03 RX ADMIN — IPRATROPIUM BROMIDE 0.5 MG: 0.5 SOLUTION RESPIRATORY (INHALATION) at 09:58

## 2020-09-03 RX ADMIN — ENOXAPARIN SODIUM 40 MG: 40 INJECTION SUBCUTANEOUS at 06:12

## 2020-09-03 RX ADMIN — POTASSIUM CHLORIDE 20 MEQ: 750 CAPSULE, EXTENDED RELEASE ORAL at 09:28

## 2020-09-03 RX ADMIN — MORPHINE SULFATE 1 MG: 2 INJECTION, SOLUTION INTRAMUSCULAR; INTRAVENOUS at 08:00

## 2020-09-03 RX ADMIN — VANCOMYCIN HYDROCHLORIDE 1250 MG: 10 INJECTION, POWDER, LYOPHILIZED, FOR SOLUTION INTRAVENOUS at 08:01

## 2020-09-03 RX ADMIN — LEVOTHYROXINE SODIUM 112 MCG: 112 TABLET ORAL at 08:00

## 2020-09-03 RX ADMIN — FAMOTIDINE 20 MG: 20 TABLET, FILM COATED ORAL at 17:54

## 2020-09-03 RX ADMIN — METHOCARBAMOL 500 MG: 500 TABLET ORAL at 16:35

## 2020-09-03 RX ADMIN — VANCOMYCIN HYDROCHLORIDE 1250 MG: 10 INJECTION, POWDER, LYOPHILIZED, FOR SOLUTION INTRAVENOUS at 20:45

## 2020-09-03 RX ADMIN — METHOCARBAMOL 500 MG: 500 TABLET ORAL at 20:45

## 2020-09-03 RX ADMIN — ALBUTEROL SULFATE 1.25 MG: 1.25 SOLUTION RESPIRATORY (INHALATION) at 06:58

## 2020-09-03 RX ADMIN — CYCLOBENZAPRINE HYDROCHLORIDE 5 MG: 5 TABLET, FILM COATED ORAL at 17:54

## 2020-09-03 RX ADMIN — MORPHINE SULFATE 1 MG: 2 INJECTION, SOLUTION INTRAMUSCULAR; INTRAVENOUS at 20:55

## 2020-09-03 RX ADMIN — TAZOBACTAM SODIUM AND PIPERACILLIN SODIUM 3.38 G: 375; 3 INJECTION, SOLUTION INTRAVENOUS at 16:35

## 2020-09-03 RX ADMIN — FUROSEMIDE 20 MG: 10 INJECTION, SOLUTION INTRAVENOUS at 17:54

## 2020-09-03 RX ADMIN — DOCUSATE SODIUM 100 MG: 100 CAPSULE ORAL at 08:00

## 2020-09-03 RX ADMIN — AMITRIPTYLINE HYDROCHLORIDE 150 MG: 75 TABLET, FILM COATED ORAL at 20:46

## 2020-09-03 RX ADMIN — ALBUTEROL SULFATE 1.25 MG: 1.25 SOLUTION RESPIRATORY (INHALATION) at 19:36

## 2020-09-03 RX ADMIN — FUROSEMIDE 40 MG: 10 INJECTION, SOLUTION INTRAMUSCULAR; INTRAVENOUS at 08:22

## 2020-09-03 RX ADMIN — SODIUM CHLORIDE, PRESERVATIVE FREE 10 ML: 5 INJECTION INTRAVENOUS at 08:00

## 2020-09-03 RX ADMIN — METOPROLOL TARTRATE 50 MG: 50 TABLET ORAL at 20:45

## 2020-09-03 RX ADMIN — POTASSIUM CHLORIDE 20 MEQ: 750 CAPSULE, EXTENDED RELEASE ORAL at 17:54

## 2020-09-03 RX ADMIN — LIDOCAINE 2 PATCH: 50 PATCH CUTANEOUS at 09:28

## 2020-09-03 RX ADMIN — MORPHINE SULFATE 1 MG: 2 INJECTION, SOLUTION INTRAMUSCULAR; INTRAVENOUS at 12:53

## 2020-09-03 RX ADMIN — METOPROLOL TARTRATE 50 MG: 50 TABLET ORAL at 08:00

## 2020-09-03 RX ADMIN — TAZOBACTAM SODIUM AND PIPERACILLIN SODIUM 3.38 G: 375; 3 INJECTION, SOLUTION INTRAVENOUS at 00:47

## 2020-09-03 RX ADMIN — ESTROGENS, CONJUGATED 0.3 MG: 0.3 TABLET, FILM COATED ORAL at 08:00

## 2020-09-03 RX ADMIN — HYDROCODONE BITARTRATE AND ACETAMINOPHEN 1 TABLET: 5; 325 TABLET ORAL at 11:13

## 2020-09-03 RX ADMIN — ALBUTEROL SULFATE 1.25 MG: 1.25 SOLUTION RESPIRATORY (INHALATION) at 09:58

## 2020-09-03 RX ADMIN — HYDROCODONE BITARTRATE AND ACETAMINOPHEN 1 TABLET: 5; 325 TABLET ORAL at 17:00

## 2020-09-03 NOTE — ANESTHESIA POSTPROCEDURE EVALUATION
Patient: Heidi Fernandez    Procedure Summary     Date:  09/02/20 Room / Location:  [facility] OR 16 /  [facility] OR    Anesthesia Start:  1522 Anesthesia Stop:  1818    Procedures:       THORACOSCOPY VIDEO ASSISTED WITH RIGHT DECORTICATION; RIGHT INTERCOSTAL NERVE BLOCK (Right Chest)      BRONCHOSCOPY (N/A Bronchus) Diagnosis:       Pleural effusion on right      (Pleural effusion on right [J90])    Surgeon:  Tyler Coburn MD Provider:  Michael Ellis CRNA    Anesthesia Type:  general ASA Status:  3 - Emergent          Anesthesia Type: general    Vitals  Vitals Value Taken Time   /66 9/2/2020  6:50 PM   Temp 98 °F (36.7 °C) 9/2/2020  6:50 PM   Pulse 89 9/2/2020  6:55 PM   Resp 24 9/2/2020  6:55 PM   SpO2 96 % 9/2/2020  6:55 PM           Post Anesthesia Care and Evaluation    Patient location during evaluation: PACU  Patient participation: complete - patient participated  Level of consciousness: awake  Pain score: 0  Pain management: adequate  Airway patency: patent  Anesthetic complications: No anesthetic complications  PONV Status: none  Cardiovascular status: acceptable  Respiratory status: acceptable  Hydration status: acceptable  No anesthesia care post op

## 2020-09-03 NOTE — PROGRESS NOTES
Automatic IV to PO Pharmacy Note - General    Heidi Fernandez is a 49 y.o. female who meets the following criteria for IV to PO therapy conversion :     Tolerating oral fluids or 40ml/hour of enteral nutrition and oral route not otherwise compromised  Receiving other oral medications on a scheduled basis    Assessment/Plan  Based on this criteria, Famotidine 20 mg IV Q12H has been changed to Famotidine 20 mg PO BIDac per the directives and guidelines established by [facility] Pharmacy and Therapeutics Committee and [facility] Medical Executive Committee .       Arnol Sterling, PharmD  09/03/2013:33

## 2020-09-03 NOTE — PLAN OF CARE
Problem: Patient Care Overview  Goal: Plan of Care Review  Outcome: Ongoing (interventions implemented as appropriate)  Flowsheets (Taken 9/3/2020 7915)  Progress: no change  Plan of Care Reviewed With: patient  Outcome Summary: Pt had a VATS with decortication & washout. A/P chest tubes -20 dry suction. Moderate drainage around tubes dsg changed. Pt given PO & IV PRN pain meds. Very sleepy after IV MS. ETCO2 monitored.

## 2020-09-03 NOTE — PLAN OF CARE
Problem: Patient Care Overview  Goal: Plan of Care Review  Outcome: Ongoing (interventions implemented as appropriate)  Flowsheets (Taken 9/3/2020 6041)  Progress: improving  Plan of Care Reviewed With: patient  Outcome Summary: Pt transferred from the unit today to . Pt has been up in the chair today. IV abx, IID in between. Tele- sinus tach. F/C in place. VSS, cont to monitor.

## 2020-09-03 NOTE — OP NOTE
Cardiothoracic Surgery Operative Note    Pre-op diagnosis: Empyema with lobar pneumonia   Post-op diagnosis: Same with possible lung abscess  Procedure:  1. Bronchoscopy with Bronchial Alveolar Lavage, CPT 55938  2. Right Video Assisted Thoracoscopic Total Pulmonary Decortication with Intrapleural Pneumolysis, CPT 45351    Surgeon: Tyler Coburn M.D.  Assistant: Joanna Lott  Anesthesia: GETA- double lumen  EBL: 100 ml  Drains: 24 [place] straight right pleural tube (anterior on skin) and 28 Fr. right angle pleural tube (posterior on skin)  Specimens:  1. Pleural peel tissue culture and pathology  2. Pleural fluid for culture and gram stain  3. Left BAL for culture and gram stain  4. Right BAL for culture and gram stain     Operative indications:    50 yo female, 2 weeks of progressive SOB and cough.  Now with loculated right pleural effusion with pleural thickening on CT scan.  Respiratory failure and unable to wean below 6L NC of oxygen.  Otherwise healthy and good candidate for VATS decortication.     Operative findings  Bronchoscopy: Normal left and right bronchial anatomy.  Scant thin secretions on the left with BAL performed.  Thicker secretions on right with BAL performed.  Intrathoracic Findings: Purulent fluid on entry into thorax.  Thick rind along entire right lower and middle lobes and approximately [facility] up right upper lobe.  Rind along entirety of parietal pleura.  Good lung expansion at the end of the case.     Operative description in detail:  The patient was taken to the operative suite where she was placed in a supine position. General anesthesia was induced without complication and a single lumen ET tube was placed by anesthesia. A timeout was performed. With that bronchoscopy was performed demonstrating no endobronchial lesion,friable right lung bronchial mucosa, normal appearing left bronchi, and standard bronchial anatomy. Bilateral BAL was performed with left first and right second.  Single  lumen tube was exchanged for double lumen tube by anesthesia. With that she was positioned in lateral decubitus position with right side up. All pressure point are protected. Single lung ventilation was initiated and double lumen endotracheal tube position confirmed. She was then prepped and draped in the usual and sterile fashion.    An incision was made sharply in line with the posterior axillary line at approximately the 8th intercostal space.  The pleural cavity was accessed.  Gelatinous material was encountered.  This was digitally disrupted circumferentially.  A thoracoport was placed and camera introduced.  Pleural fluid which was purulent was drained and sterilely sampled.  Additional incision was made along anterior axillary line in the 6th ICS.  We then performed lysis of adhesions and total decortication from bottom of lower lobe up onto mid portion of upper lobe.  There was a semi-thick peel on the visceral pleura that  nicely.  Partial peel was also taken for entire thorax and diaphragmatic surface.  There was a large peel and fluid collection in the major fissure which was disrupted and removed.  Some of the pleural peel was sent for culture.  In the anterior right lower lobe, area of firm necrosis was encountered, this was decorticated with minimal disruption of lung parenchyma.  Inflammation of all pleural surfaces was significant with high vascularity.  I then performed transthoracic intercostal nerve blocks with 2cc  of liposomal bupivicaine in each ICS from 10-4.    The pleural cavity was copiously irrigated until irrigant was clear.  There was adequate hemostasis.  A 28Fr right angle chest tube was tunneled into 8th ICS and placed posteriorly and inferiorly in pleural cavity.  A 24Fr straight chest tube was also tunneled into the 8th ICS and placed apical.  Both chest tubes were place to 20cm of suction on pleuravac.  The lung was expanded under direct vision and filled the thoracic  cavity well.  The chest tubes were secured in place with suture.  The wounds were closed in 3 anatomical layers with interrupted suture on top two layers to allow for drainage if any occurred.  The patient was awakened from anesthesia and extubated in the operating room.   Instruments, sharps, and sponge counts were reported as correct at the completion of the case.    Complications: None  Disposition: Transfer to PACU extubated and in stable condition.     Tyler Coburn M.D.  Cardiothoracic Surgeon

## 2020-09-03 NOTE — PROGRESS NOTES
"Patient name: Heidi Fernandez  Patient : 1970  VISIT # [id number]  MR #[id number]    Procedure:Procedure(s):  THORACOSCOPY VIDEO ASSISTED WITH RIGHT DECORTICATION; RIGHT INTERCOSTAL NERVE BLOCK  BRONCHOSCOPY  Procedure Date:2020  POD:1 Day Post-Op    Subjective     Patient is resting in bed tolerating 3 liters nasal cannula with O2 98%.  WBC 19 today, improving.  She does complain of pain at chest tube site.  Nursing states she is sensitive to pain medication and became very drowsy last night after Morphine was given.  Otherwise uneventful night.         Objective     Visit Vitals  /79 (BP Location: Right arm, Patient Position: Lying)   Pulse 87   Temp 98.2 °F (36.8 °C) (Oral)   Resp 26   Ht 170.2 cm (67\")   Wt 73.7 kg (162 lb 6 oz)   LMP 2012   SpO2 100%   BMI 25.43 kg/m²       Intake/Output Summary (Last 24 hours) at 9/3/2020 0837  Last data filed at 9/3/2020 0700  Gross per 24 hour   Intake 2710.62 ml   Output 2570 ml   Net 140.62 ml     CT 1: 194 ml/24 hrs, serosanguineous, no air leak  CT 2: 176 ml/24 hrs, serosanguineous, no air leak    Lab:         IMAGES:       Imaging Results (Last 24 Hours)     Procedure Component Value Units Date/Time    XR Chest 1 View [[id number]] Collected:  20     Updated:  20    Narrative:       Frontal upright radiograph of the chest 9/3/2020 3:35 AM CDT     HISTORY: Empyema on right, s/p right VATS washout     COMPARISON: Chest exam dated 2020.     FINDINGS:      There are 2 right-sided chest tubes in stable position, one coursing  towards the right apex and a second towards the base. No pneumothorax.  There are bilateral perihilar and bibasilar consolidative infiltrates  which are not significantly changed. There appears to be residual trace  right-sided effusion/empyema. Trace left-sided effusion also  appreciated. Heart size is stable. The pulmonary vasculature are  nondilated. Underlying spinal scoliotic curvature. No acute " bony  abnormality.       Impression:       1. Right-sided chest tubes are in stable position. No pneumothorax.  2. Bilateral perihilar and bibasilar consolidative infiltrates are not  significantly changed.        This report was finalized on 09/03/2020 07:06 by Dr Martinez Goncalves, .    XR Chest 1 View [[id number]] Collected:  09/02/20 1942     Updated:  09/02/20 1947    Narrative:       EXAMINATION: XR CHEST 1 VW- 9/2/2020 7:42 PM CDT     HISTORY: Empyema status post VATS washout     COMPARISON: 9/1/2020     FINDINGS:  There is been interval placement of a right-sided chest tube with tip  directed at the right lung apex. A second chest tube is in place with  tip projecting over the right lung base. There is been decrease in the  size of a right pleural effusion. Diffuse interstitial and alveolar  opacities persist bilaterally with relative sparing of the lung apices.  There is no appreciable pneumothorax. The heart and mediastinal contours  are stable. The pulmonary vasculature is indistinct. Trace pleural  effusion also noted on the left.       Impression:       Status post chest tube placement on the right as above.  Persistent bilateral airspace opacities may reflect edema, infection,  hemorrhage, or a combination of these. Trace pleural fluid bilaterally.  This report was finalized on 09/02/2020 19:44 by Dr. Lavon Roberts MD.        CXR: no pneumothorax, right side chest tubes in stable position, trace bilateral effusion with trace right side empyema.     Physical Exam:  General: Alert, oriented. No apparent distress.   Cardiovascular: Regular rate and rhythm without murmur, rubs, or gallops.    Pulmonary: Diminished breath sounds on the right, no wheezing, rubs, or rales.  Chest: Thoracic incisions clean, dry, and intact. Chest tubes to 20 cm H2O suction. No air leak. Fluid is serosanguineous.    Abdomen: Soft, non distended, and non tender.  Extremities: Warm, moves all extremities. No edema.    Neurologic:   Grossly intact with no focal deficits.        Impression:  Empyema, right  Pleural effusion, right  Leukocytosis  Pneumonitis    Plan:  Continue chest tubes to 20 cm H2O suction  Multimodality pain control regimen, decrease Morphine to 1 mg Q4H PRN, add lidocaine patches and robaxin.  Norco 5mg Q6H PRN   Diurese  DC IVF  Initiate bowel regimen  Add nutrition shakes with meals  OK to transfer to floor from CT Surgery standpoint    ANA John  09/03/20  08:37

## 2020-09-03 NOTE — PROGRESS NOTES
[facility] Medicine Services  INPATIENT PROGRESS NOTE    Length of Stay: 4  Date of Admission: 8/30/2020  Primary Care Physician: Raegan Dubose DO    Subjective   Chief Complaint: Pneumonia/loculated pleural effusion/shortness of breath/pancreatitis/leukocytosis/fever.    HPI   T-max 101.4.  T-current 98.2.  Status post VATS procedure.  Patient is currently on 3 L of oxygen.  Chest tubes in place.  Chest pain from chest tube.    Review of Systems   Constitutional: Positive for activity change, appetite change and fatigue. Negative for chills and fever.   HENT: Negative for hearing loss, nosebleeds, tinnitus and trouble swallowing.    Eyes: Negative for visual disturbance.   Respiratory: Positive for shortness of breath. Negative for cough, chest tightness and wheezing.    Cardiovascular: Chest pain from chest tube.  No palpitations and leg swelling.   Gastrointestinal: Negative for abdominal distention, abdominal pain, blood in stool, constipation, diarrhea, nausea and vomiting.   Endocrine: Negative for cold intolerance, heat intolerance, polydipsia, polyphagia and polyuria.   Genitourinary: Negative for decreased urine volume, difficulty urinating, dysuria, flank pain, frequency and hematuria.   Musculoskeletal: Positive for arthralgias, gait problem and myalgias. Negative for joint swelling.   Skin: Negative for rash.   Allergic/Immunologic: Negative for immunocompromised state.   Neurological: Positive for weakness. Negative for dizziness, syncope, light-headedness and headaches.   Hematological: Negative for adenopathy. Does not bruise/bleed easily.   Psychiatric/Behavioral: Negative for confusion and sleep disturbance. The patient is not nervous/anxious.     All pertinent negatives and positives are as above. All other systems have been reviewed and are negative unless otherwise stated.     Objective    Temp:  [97.6 °F (36.4 °C)-101.4 °F (38.6 °C)] 98.2 °F  (36.8 °C)  Heart Rate:  [] 87  Resp:  [18-35] 26  BP: ()/(39-89) 141/79  Arterial Line BP: (103-158)/(49-82) 157/82    Intake/Output Summary (Last 24 hours) at 9/3/2020 0758  Last data filed at 9/3/2020 0700  Gross per 24 hour   Intake 2811.62 ml   Output 2570 ml   Net 241.62 ml     Physical Exam  Constitutional: She is oriented to person, place, and time. She appears well-developed.   HENT:   Head: Normocephalic.   Eyes: Pupils are equal, round, and reactive to light. Conjunctivae are normal.   Neck: Neck supple. No JVD present.   Cardiovascular: Normal heart sounds and intact distal pulses. Exam reveals no gallop and no friction rub.   No murmur heard.  Pulmonary/Chest: No respiratory distress. She has no rales. She exhibits no tenderness.  Decrease in breath sound/slight rales on right.     Patient is on 3 L of oxygen.  2 Chest tubes in place right chest.  Abdominal: Soft. Bowel sounds are normal. She exhibits no distension.  Nontender abdomen.  There is no rebound and no guarding.   Musculoskeletal: Normal range of motion. She exhibits no edema, tenderness or deformity.   Neurological: She is alert and oriented to person, place, and time. She displays normal reflexes. No cranial nerve deficit. She exhibits abnormal muscle tone. Coordination abnormal.   Skin: Skin is warm and dry. Capillary refill takes 2 to 3 seconds. No rash noted.   Psychiatric: She has a normal mood and affect. Her behavior is normal. Judgment and thought content normal.   Nursing note and vitals reviewed.  Results Review:  Lab Results (last 24 hours)     Procedure Component Value Units Date/Time    Body Fluid Culture - Body Fluid, Pleural Cavity [[id number]]  (Abnormal) Collected:  09/01/20 1346    Specimen:  Body Fluid from Pleural Cavity Updated:  09/03/20 0717     Body Fluid Culture Light growth (2+) Gram Positive Cocci     Gram Stain Many (4+) WBCs seen      No organisms seen    Body Fluid Culture - Body Fluid, Bronchus  [[id number]] Collected:  09/02/20 1539    Specimen:  Body Fluid from Bronchus Updated:  09/03/20 0715     Body Fluid Culture Abnormal Stain     Gram Stain Moderate (3+) WBCs seen      Few (2+) Gram negative bacilli    Tissue / Bone Culture - Tissue, Pleural Cavity [[id number]] Collected:  09/02/20 1614    Specimen:  Tissue from Pleural Cavity Updated:  09/03/20 0710     Gram Stain Few (2+) WBCs seen      No organisms seen    Comprehensive Metabolic Panel [[id number]]  (Abnormal) Collected:  09/03/20 0301    Specimen:  Blood Updated:  09/03/20 0407     Glucose 153 mg/dL      BUN 7 mg/dL      Creatinine 0.23 mg/dL      Sodium 138 mmol/L      Potassium 3.6 mmol/L      Chloride 98 mmol/L      CO2 26.0 mmol/L      Calcium 7.7 mg/dL      Total Protein 5.2 g/dL      Albumin 2.30 g/dL      ALT (SGPT) 43 U/L      AST (SGOT) 40 U/L      Alkaline Phosphatase 456 U/L      Total Bilirubin 0.5 mg/dL      eGFR Non African Amer >150 mL/min/1.73      Globulin 2.9 gm/dL      A/G Ratio 0.8 g/dL      BUN/Creatinine Ratio 30.4     Anion Gap 14.0 mmol/L     Narrative:       GFR Normal >60  Chronic Kidney Disease <60  Kidney Failure <15      CBC & Differential [[id number]] Collected:  09/03/20 0301    Specimen:  Blood Updated:  09/03/20 0347    Narrative:       The following orders were created for panel order CBC & Differential.  Procedure                               Abnormality         Status                     ---------                               -----------         ------                     CBC Auto Differential[[id number]]        Abnormal            Final result                 Please view results for these tests on the individual orders.    CBC Auto Differential [[id number]]  (Abnormal) Collected:  09/03/20 0301    Specimen:  Blood Updated:  09/03/20 0347     WBC 19.50 10*3/mm3      RBC 3.06 10*6/mm3      Hemoglobin 9.1 g/dL      Hematocrit 27.0 %      MCV 88.2 fL      MCH 29.7 pg      MCHC 33.7 g/dL      RDW 13.8 %      RDW-SD  44.5 fl      MPV 8.3 fL      Platelets 642 10*3/mm3      Neutrophil % 93.4 %      Lymphocyte % 2.6 %      Monocyte % 2.8 %      Eosinophil % 0.0 %      Basophil % 0.2 %      Immature Grans % 1.0 %      Neutrophils, Absolute 18.20 10*3/mm3      Lymphocytes, Absolute 0.51 10*3/mm3      Monocytes, Absolute 0.55 10*3/mm3      Eosinophils, Absolute 0.00 10*3/mm3      Basophils, Absolute 0.04 10*3/mm3      Immature Grans, Absolute 0.20 10*3/mm3      nRBC 0.0 /100 WBC     Body Fluid Culture - Body Fluid, Bronchus [[id number]] Collected:  09/02/20 1542    Specimen:  Body Fluid from Bronchus Updated:  09/03/20 0052     Gram Stain Moderate (3+) WBCs seen      No organisms seen    Body Fluid Culture - Body Fluid, Pleural Cavity [[id number]] Collected:  09/02/20 1608    Specimen:  Body Fluid from Pleural Cavity Updated:  09/03/20 0048     Body Fluid Culture Abnormal Stain     Gram Stain Moderate (3+) WBCs seen      Few (2+) Gram positive cocci    Glucose, Body Fluid - Pleural Fluid, Pleural Cavity [[id number]] Collected:  09/01/20 1347    Specimen:  Pleural Fluid from Pleural Cavity Updated:  09/02/20 2048     Glucose, Fluid <54 mg/dL     Narrative:       No Reference Ranges Established.    Serous fluid glucose less than 60 mg/dL or less than 30 mg/dL below serum glucose suggests an infectious or malignant exudate.     This test was developed, it performance characteristics determined and judged suitable for clinical purposes by [facility].  It has not been cleared or approved by the FDA.  The laboratory is regulated under CLIA as qualified to perform high-complexity testing.     Basic Metabolic Panel [[id number]]  (Abnormal) Collected:  09/02/20 1947    Specimen:  Blood Updated:  09/02/20 2030     Glucose 150 mg/dL      BUN 6 mg/dL      Creatinine 0.39 mg/dL      Sodium 136 mmol/L      Potassium 3.6 mmol/L      Chloride 95 mmol/L      CO2 24.0 mmol/L      Calcium 7.8 mg/dL      eGFR Non African Amer >150  mL/min/1.73      BUN/Creatinine Ratio 15.4     Anion Gap 17.0 mmol/L     Narrative:       GFR Normal >60  Chronic Kidney Disease <60  Kidney Failure <15      Amylase, Body Fluid - Pleural Fluid, Pleural Cavity [[id number]] Collected:  09/01/20 1347    Specimen:  Pleural Fluid from Pleural Cavity Updated:  09/02/20 2011     Amylase, Fluid 224 U/L     Narrative:       No Reference Ranges Established.    This test was developed, it performance characteristics determined and judged suitable for clinical purposes by [facility].  It has not been cleared or approved by the FDA.  The laboratory is regulated under CLIA as qualified to perform high-complexity testing.     CBC (No Diff) [[id number]]  (Abnormal) Collected:  09/02/20 1947    Specimen:  Blood Updated:  09/02/20 1953     WBC 21.80 10*3/mm3      RBC 3.31 10*6/mm3      Hemoglobin 9.8 g/dL      Hematocrit 29.2 %      MCV 88.2 fL      MCH 29.6 pg      MCHC 33.6 g/dL      RDW 13.9 %      RDW-SD 45.2 fl      MPV 7.9 fL      Platelets 621 10*3/mm3     Fungus Culture - Tissue, Pleural Cavity [[id number]] Collected:  09/02/20 1614    Specimen:  Tissue from Pleural Cavity Updated:  09/02/20 1904    Anaerobic Culture - Tissue, Pleural Cavity [[id number]] Collected:  09/02/20 1614    Specimen:  Tissue from Pleural Cavity Updated:  09/02/20 1646    Anaerobic Culture - Body Fluid, Pleural Cavity [[id number]] Collected:  09/02/20 1608    Specimen:  Body Fluid from Pleural Cavity Updated:  09/02/20 1638    Fungus Culture - Body Fluid, Pleural Cavity [[id number]] Collected:  09/02/20 1608    Specimen:  Body Fluid from Pleural Cavity Updated:  09/02/20 1638    Anaerobic Culture - Body Fluid, Bronchus [[id number]] Collected:  09/02/20 1542    Specimen:  Body Fluid from Bronchus Updated:  09/02/20 1636    Fungus Culture - Body Fluid, Bronchus [[id number]] Collected:  09/02/20 1542    Specimen:  Body Fluid from Bronchus Updated:  09/02/20 1636    Fungus Culture -  Body Fluid, Bronchus [[id number]] Collected:  09/02/20 1539    Specimen:  Body Fluid from Bronchus Updated:  09/02/20 1636    Anaerobic Culture - Body Fluid, Bronchus [[id number]] Collected:  09/02/20 1539    Specimen:  Body Fluid from Bronchus Updated:  09/02/20 1636    MRSA Screen, PCR (Inpatient) - Swab, Nares [[id number]]  (Normal) Collected:  09/02/20 1029    Specimen:  Swab from Nares Updated:  09/02/20 1218     MRSA PCR No MRSA Detected    Protime-INR [[id number]]  (Abnormal) Collected:  09/02/20 0930    Specimen:  Blood Updated:  09/02/20 0947     Protime 15.0 Seconds      INR 1.22    Blood Culture With KENISHA - Blood, Arm, Right [[id number]] Collected:  08/31/20 0638    Specimen:  Blood from Arm, Right Updated:  09/02/20 0900     Blood Culture No growth at 2 days    Blood Culture With KENISHA - Blood, Hand, Right [[id number]] Collected:  08/31/20 0614    Specimen:  Blood from Hand, Right Updated:  09/02/20 0900     Blood Culture No growth at 2 days    Non-gynecologic Cytology [[id number]] Collected:  09/01/20 1346    Specimen:  Body Fluid from Pleural Cavity Updated:  09/02/20 0804           Cultures:  Blood Culture   Date Value Ref Range Status   08/31/2020 No growth at 2 days  Preliminary   08/31/2020 No growth at 2 days  Preliminary       Radiology Data:    Imaging Results (Last 24 Hours)     Procedure Component Value Units Date/Time    XR Chest 1 View [[id number]] Collected:  09/03/20 0701     Updated:  09/03/20 0709    Narrative:       Frontal upright radiograph of the chest 9/3/2020 3:35 AM CDT     HISTORY: Empyema on right, s/p right VATS washout     COMPARISON: Chest exam dated 9/2/2020.     FINDINGS:      There are 2 right-sided chest tubes in stable position, one coursing  towards the right apex and a second towards the base. No pneumothorax.  There are bilateral perihilar and bibasilar consolidative infiltrates  which are not significantly changed. There appears to be residual trace  right-sided  effusion/empyema. Trace left-sided effusion also  appreciated. Heart size is stable. The pulmonary vasculature are  nondilated. Underlying spinal scoliotic curvature. No acute bony  abnormality.       Impression:       1. Right-sided chest tubes are in stable position. No pneumothorax.  2. Bilateral perihilar and bibasilar consolidative infiltrates are not  significantly changed.        This report was finalized on 09/03/2020 07:06 by Dr Martinez Goncalves, .    XR Chest 1 View [[id number]] Collected:  09/02/20 1942     Updated:  09/02/20 1947    Narrative:       EXAMINATION: XR CHEST 1 VW- 9/2/2020 7:42 PM CDT     HISTORY: Empyema status post VATS washout     COMPARISON: 9/1/2020     FINDINGS:  There is been interval placement of a right-sided chest tube with tip  directed at the right lung apex. A second chest tube is in place with  tip projecting over the right lung base. There is been decrease in the  size of a right pleural effusion. Diffuse interstitial and alveolar  opacities persist bilaterally with relative sparing of the lung apices.  There is no appreciable pneumothorax. The heart and mediastinal contours  are stable. The pulmonary vasculature is indistinct. Trace pleural  effusion also noted on the left.       Impression:       Status post chest tube placement on the right as above.  Persistent bilateral airspace opacities may reflect edema, infection,  hemorrhage, or a combination of these. Trace pleural fluid bilaterally.  This report was finalized on 09/02/2020 19:44 by Dr. Lavon Roberts MD.          No Known Allergies    Scheduled meds:     albuterol 1.25 mg Nebulization 4x Daily - RT   amitriptyline 150 mg Oral Nightly   docusate sodium 100 mg Oral Daily   enoxaparin 40 mg Subcutaneous Q24H   estrogens (conjugated) 0.3 mg Oral Daily   famotidine 20 mg Intravenous BID   ipratropium 0.5 mg Nebulization 4x Daily - RT   levothyroxine 112 mcg Oral Q AM   metoprolol tartrate 50 mg Oral Q12H    piperacillin-tazobactam 3.375 g Intravenous Q8H   sodium chloride 10 mL Intravenous Q12H   vancomycin 1,250 mg Intravenous Q12H       PRN meds:  •  acetaminophen **OR** acetaminophen  •  benzonatate  •  cyclobenzaprine  •  Morphine **AND** naloxone  •  ondansetron  •  ondansetron **OR** ondansetron  •  oxyCODONE-acetaminophen  •  Pharmacy to Dose enoxaparin (LOVENOX)  •  sodium chloride    Assessment/Plan       Acute pancreatitis    Pneumonitis    Pleural effusion on right    Elevated liver enzymes      Plan:  Right loculated pleural effusion/pneumonia/right pneumonitis/coughing.  Continue Zosyn.  Cont Vancomycin.  Tessalon Perle PRN.  Cont CT surgeon.  Atrovent nebs.  Incentive spirometer.  Lasix 40 mg x 1.  CT scan of the chest-Moderate lobulated/loculated right pleural effusion with loculated fluid in the right major fissure, scattered bilateral patchy pulmonary opacities representing multifocal pneumonia, trapped portions of the right lower lobe with compressive atelectasis of right middle and lower lobes, reactive mediastinal lymph nodes.  Status post thoracentesis 9/1/2020.   Status post VATS 9/2/2020.  2 Chest tubes in place.     Acute pancreatitis.  Lipase resolved.  GI sign off.   CT scan abdomen pelvic 8/30/2020 at [facility]- mild acute pancreatitis-no suggestion of pancreatic necrosis or pseudocyst formation, cystocele, status post hysterectomy and bilateral salpingo-oophorectomy     Elevated liver enzyme/gallbladder sludge.  Liver enzyme stable.  Acute hepatitis panel- neg.   Ultrasound of the liver-Sludge is present in the gallbladder, liver is unremarkable, hepatopedal flow is present in the portal vein.     Hypertension/tachycardia.  Tachycardia resolved.  Cont Lopressor.    Echocardiogram-ejection fraction 60-65%, mild mitral valve regurgitation.      Depression.  Elavil at night.      Postmenopausal.  Premarin.      Hypothyroidism.  Continue Synthroid.     Pain control.  Morphine as needed.   Flexeril as needed. Percocet PRN.    Prophylaxis Lovenox.     Nausea.  Zofran PRN.  Pepcid.     Nutrition.  Advance diet as tolerated.     Blood cultures-no growth 2 days.  Body fluid culture- pending.  COVID-19-negative.  Respiratory PCR-negative.  Respiratory culture mixed gram-positive simran.  Cytology is pending.    Tissue culture pending.  Fungal culture pending.     Discharge Plannin-5 days.  Transfer from [facility].  Transfer to floor.    Electronically signed by Randolph Liang MD, 20, 7:58 AM.

## 2020-09-03 NOTE — PLAN OF CARE
Problem: Patient Care Overview  Goal: Plan of Care Review  Outcome: Ongoing (interventions implemented as appropriate)  Flowsheets (Taken 9/3/2020 1012)  Progress: no change  Plan of Care Reviewed With: patient  Outcome Summary: Diet advanced from NPO to regular. No po intake recorded yet. She has chest tubes. She complains of weakness. She has been ordered Boost Plus TID with meals for extra kcal and protein. Will follow for nutrition needs.

## 2020-09-03 NOTE — PROGRESS NOTES
"Pharmacy Dosing Service  Pharmacokinetics  Vancomycin Follow-up Evaluation    Assessment/Action/Plan:  Vancomycin trough ordered before dose due on 9/4/2020 at 0830.  Continue Vancomycin 1250mg iv q12h.    Per [employer] calculator:  Regimen: 1250 mg every 12 hours for 7 doses.  Start time: 20:30 on 09/03/2020  Exposure target: AUC24 (range)400-600 mg/L.hr  AUC24,ss: 463 mg/L.hr  PAUC*: 65 %  Ctrough,ss: 13 mg/L  Pconc*: 21 %  Tox.: 8 %    Pharmacy will continue to monitor renal function and adjust dose accordingly.     Subjective:  Heidi Fernandez is a 49 y.o. female currently on Vancomycin 1250 mg IV every 12 hours for the treatment of pneumonia, day 2 of therapy (Current vancomycin end date: 9/6/2020).    Objective:  Ht: 170.2 cm (67\"); Wt: 73.7 kg (162 lb 6 oz)  Estimated Creatinine Clearance: 336.7 mL/min (A) (by C-G formula based on SCr of 0.23 mg/dL (L)).   Lab Results   Component Value Date    CREATININE 0.23 (L) 09/03/2020    CREATININE 0.39 (L) 09/02/2020    CREATININE 0.29 (L) 09/02/2020      Lab Results   Component Value Date    WBC 19.50 (H) 09/03/2020    WBC 21.80 (H) 09/02/2020    WBC 23.17 (H) 09/02/2020       No results found for: VANCOPEAK, VANCOTROUGH, VANCORANDOM    Culture Results:  Microbiology Results (last 10 days)       Procedure Component Value - Date/Time    Tissue / Bone Culture - Tissue, Pleural Cavity [[id number]] Collected:  09/02/20 1614    Lab Status:  Preliminary result Specimen:  Tissue from Pleural Cavity Updated:  09/03/20 1215     Tissue Culture No growth     Gram Stain Few (2+) WBCs seen      No organisms seen    Body Fluid Culture - Body Fluid, Pleural Cavity [[id number]] Collected:  09/02/20 1608    Lab Status:  Preliminary result Specimen:  Body Fluid from Pleural Cavity Updated:  09/03/20 1215     Body Fluid Culture No growth     Gram Stain Moderate (3+) WBCs seen      Few (2+) Gram positive cocci    Body Fluid Culture - Body Fluid, Bronchus [[id number]] Collected:  09/02/20 " 1542    Lab Status:  Preliminary result Specimen:  Body Fluid from Bronchus Updated:  09/03/20 1215     Body Fluid Culture No growth     Gram Stain Moderate (3+) WBCs seen      No organisms seen    Body Fluid Culture - Body Fluid, Bronchus [[id number]] Collected:  09/02/20 1539    Lab Status:  Preliminary result Specimen:  Body Fluid from Bronchus Updated:  09/03/20 1216     Body Fluid Culture No growth     Gram Stain Moderate (3+) WBCs seen      Few (2+) Gram negative bacilli    MRSA Screen, PCR (Inpatient) - Swab, Nares [[id number]]  (Normal) Collected:  09/02/20 1029    Lab Status:  Final result Specimen:  Swab from Nares Updated:  09/02/20 1218     MRSA PCR No MRSA Detected    Respiratory Culture - Sputum, Cough [[id number]] Collected:  09/01/20 1920    Lab Status:  Preliminary result Specimen:  Sputum from Cough Updated:  09/03/20 0853     Respiratory Culture Scant growth (1+) Normal Respiratory Simran: NO S.aureus/MRSA or Pseudomonas aeruginosa     Gram Stain Greater than 25 WBCs per low power field      Less than 25 Epithelial cells per low power field      Rare (1+) Mixed gram positive simran    Body Fluid Culture - Body Fluid, Pleural Cavity [[id number]]  (Abnormal) Collected:  09/01/20 1346    Lab Status:  Preliminary result Specimen:  Body Fluid from Pleural Cavity Updated:  09/03/20 0717     Body Fluid Culture Light growth (2+) Gram Positive Cocci     Gram Stain Many (4+) WBCs seen      No organisms seen    COVID PRE-OP / PRE-PROCEDURE SCREENING ORDER (NO ISOLATION) - Swab, Nasopharynx [[id number]] Collected:  08/31/20 1029    Lab Status:  Final result Specimen:  Swab from Nasopharynx Updated:  08/31/20 1128    Narrative:       The following orders were created for panel order COVID PRE-OP / PRE-PROCEDURE SCREENING ORDER (NO ISOLATION) - Swab, Nasopharynx.  Procedure                               Abnormality         Status                     ---------                               -----------         ------                      Respiratory Panel PCR w/...[[id number]]  Normal              Final result                 Please view results for these tests on the individual orders.    Respiratory Panel PCR w/COVID-19(SARS-CoV-2) [facility] In-House, NP Swab in UTM/VTM, 3-4 HR TAT - Swab, Nasopharynx [[id number]]  (Normal) Collected:  08/31/20 1029    Lab Status:  Final result Specimen:  Swab from Nasopharynx Updated:  08/31/20 1128     ADENOVIRUS, PCR Not Detected     Coronavirus 229E Not Detected     Coronavirus HKU1 Not Detected     Coronavirus NL63 Not Detected     Coronavirus OC43 Not Detected     COVID19 Not Detected     Human Metapneumovirus Not Detected     Human Rhinovirus/Enterovirus Not Detected     Influenza A PCR Not Detected     Influenza A H1 Not Detected     Influenza A H1 2009 PCR Not Detected     Influenza A H3 Not Detected     Influenza B PCR Not Detected     Parainfluenza Virus 1 Not Detected     Parainfluenza Virus 2 Not Detected     Parainfluenza Virus 3 Not Detected     Parainfluenza Virus 4 Not Detected     RSV, PCR Not Detected     Bordetella pertussis pcr Not Detected     Bordetella parapertussis PCR Not Detected     Chlamydophila pneumoniae PCR Not Detected     Mycoplasma pneumo by PCR Not Detected    Narrative:       Fact sheet for providers: https://docs.[employer].com/wp-content/uploads/LSN1584-0057-FD4.1-EUA-Provider-Fact-Sheet-3.pdf    Fact sheet for patients: https://docs.[employer].com/wp-content/uploads/VCK3181-4850-YL2.1-EUA-Patient-Fact-Sheet-1.pdf    Blood Culture With KENISHA - Blood, Arm, Right [[id number]] Collected:  08/31/20 0638    Lab Status:  Preliminary result Specimen:  Blood from Arm, Right Updated:  09/03/20 0900     Blood Culture No growth at 3 days    Blood Culture With KENISHA - Blood, Hand, Right [[id number]] Collected:  08/31/20 0614    Lab Status:  Preliminary result Specimen:  Blood from Hand, Right Updated:  09/03/20 0900     Blood Culture No growth at 3 days    COVID-19,  [facility] IN-HOUSE,NP Swab (NO TRANSPORT MEDIA) 2 HR TAT - Swab, Nasopharynx [[id number]]  (Normal) Collected:  08/31/20 0114    Lab Status:  Final result Specimen:  Swab from Nasopharynx Updated:  08/31/20 0137     COVID19 Not Detected    Narrative:       Fact sheet for providers: https://www.fda.gov/media/727304/download     Fact sheet for patients: https://www.fda.gov/media/756477/download            Arnol Sterling, Rosa MariaD   09/03/20 13:15

## 2020-09-04 ENCOUNTER — APPOINTMENT (OUTPATIENT)
Dept: GENERAL RADIOLOGY | Facility: HOSPITAL | Age: 50
End: 2020-09-04

## 2020-09-04 LAB
ALBUMIN SERPL-MCNC: 2.3 G/DL (ref 3.5–5.2)
ALBUMIN/GLOB SERPL: 0.8 G/DL
ALP SERPL-CCNC: 359 U/L (ref 39–117)
ALT SERPL W P-5'-P-CCNC: 43 U/L (ref 1–33)
ANION GAP SERPL CALCULATED.3IONS-SCNC: 10 MMOL/L (ref 5–15)
AST SERPL-CCNC: 38 U/L (ref 1–32)
BACTERIA FLD CULT: ABNORMAL
BACTERIA SPEC RESP CULT: NORMAL
BASOPHILS # BLD AUTO: 0.03 10*3/MM3 (ref 0–0.2)
BASOPHILS NFR BLD AUTO: 0.2 % (ref 0–1.5)
BILIRUB SERPL-MCNC: 0.4 MG/DL (ref 0–1.2)
BUN SERPL-MCNC: 11 MG/DL (ref 6–20)
BUN/CREAT SERPL: 37.9 (ref 7–25)
CALCIUM SPEC-SCNC: 7.9 MG/DL (ref 8.6–10.5)
CHLORIDE SERPL-SCNC: 94 MMOL/L (ref 98–107)
CO2 SERPL-SCNC: 31 MMOL/L (ref 22–29)
CREAT SERPL-MCNC: 0.29 MG/DL (ref 0.57–1)
DEPRECATED RDW RBC AUTO: 44.1 FL (ref 37–54)
EOSINOPHIL # BLD AUTO: 0.03 10*3/MM3 (ref 0–0.4)
EOSINOPHIL NFR BLD AUTO: 0.2 % (ref 0.3–6.2)
ERYTHROCYTE [DISTWIDTH] IN BLOOD BY AUTOMATED COUNT: 13.6 % (ref 12.3–15.4)
GFR SERPL CREATININE-BSD FRML MDRD: >150 ML/MIN/1.73
GLOBULIN UR ELPH-MCNC: 3 GM/DL
GLUCOSE SERPL-MCNC: 117 MG/DL (ref 65–99)
GRAM STN SPEC: ABNORMAL
GRAM STN SPEC: ABNORMAL
GRAM STN SPEC: NORMAL
HCT VFR BLD AUTO: 28.6 % (ref 34–46.6)
HGB BLD-MCNC: 9.8 G/DL (ref 12–15.9)
IMM GRANULOCYTES # BLD AUTO: 0.14 10*3/MM3 (ref 0–0.05)
IMM GRANULOCYTES NFR BLD AUTO: 0.8 % (ref 0–0.5)
LYMPHOCYTES # BLD AUTO: 1.3 10*3/MM3 (ref 0.7–3.1)
LYMPHOCYTES NFR BLD AUTO: 7.3 % (ref 19.6–45.3)
MAGNESIUM SERPL-MCNC: 2.1 MG/DL (ref 1.6–2.6)
MCH RBC QN AUTO: 30 PG (ref 26.6–33)
MCHC RBC AUTO-ENTMCNC: 34.3 G/DL (ref 31.5–35.7)
MCV RBC AUTO: 87.5 FL (ref 79–97)
MONOCYTES # BLD AUTO: 1.35 10*3/MM3 (ref 0.1–0.9)
MONOCYTES NFR BLD AUTO: 7.6 % (ref 5–12)
NEUTROPHILS NFR BLD AUTO: 14.98 10*3/MM3 (ref 1.7–7)
NEUTROPHILS NFR BLD AUTO: 83.9 % (ref 42.7–76)
NRBC BLD AUTO-RTO: 0 /100 WBC (ref 0–0.2)
PLATELET # BLD AUTO: 765 10*3/MM3 (ref 140–450)
PMV BLD AUTO: 8.1 FL (ref 6–12)
POTASSIUM SERPL-SCNC: 3.4 MMOL/L (ref 3.5–5.2)
PROT SERPL-MCNC: 5.3 G/DL (ref 6–8.5)
RBC # BLD AUTO: 3.27 10*6/MM3 (ref 3.77–5.28)
SODIUM SERPL-SCNC: 135 MMOL/L (ref 136–145)
VANCOMYCIN TROUGH SERPL-MCNC: 7 MCG/ML (ref 5–20)
WBC # BLD AUTO: 17.83 10*3/MM3 (ref 3.4–10.8)

## 2020-09-04 PROCEDURE — 80202 ASSAY OF VANCOMYCIN: CPT | Performed by: FAMILY MEDICINE

## 2020-09-04 PROCEDURE — 25010000002 FUROSEMIDE PER 20 MG: Performed by: NURSE PRACTITIONER

## 2020-09-04 PROCEDURE — 25010000002 ENOXAPARIN PER 10 MG: Performed by: FAMILY MEDICINE

## 2020-09-04 PROCEDURE — 94799 UNLISTED PULMONARY SVC/PX: CPT

## 2020-09-04 PROCEDURE — 80053 COMPREHEN METABOLIC PANEL: CPT | Performed by: FAMILY MEDICINE

## 2020-09-04 PROCEDURE — 25010000002 PIPERACILLIN SOD-TAZOBACTAM PER 1 G: Performed by: FAMILY MEDICINE

## 2020-09-04 PROCEDURE — 83735 ASSAY OF MAGNESIUM: CPT | Performed by: FAMILY MEDICINE

## 2020-09-04 PROCEDURE — 71045 X-RAY EXAM CHEST 1 VIEW: CPT

## 2020-09-04 PROCEDURE — 25010000002 VANCOMYCIN 10 G RECONSTITUTED SOLUTION: Performed by: FAMILY MEDICINE

## 2020-09-04 PROCEDURE — 99024 POSTOP FOLLOW-UP VISIT: CPT | Performed by: NURSE PRACTITIONER

## 2020-09-04 PROCEDURE — 85025 COMPLETE CBC W/AUTO DIFF WBC: CPT | Performed by: FAMILY MEDICINE

## 2020-09-04 PROCEDURE — 25010000002 MORPHINE SULFATE (PF) 2 MG/ML SOLUTION: Performed by: NURSE PRACTITIONER

## 2020-09-04 PROCEDURE — 25010000002 KETOROLAC TROMETHAMINE PER 15 MG: Performed by: NURSE PRACTITIONER

## 2020-09-04 RX ORDER — KETOROLAC TROMETHAMINE 15 MG/ML
15 INJECTION, SOLUTION INTRAMUSCULAR; INTRAVENOUS EVERY 6 HOURS SCHEDULED
Status: DISCONTINUED | OUTPATIENT
Start: 2020-09-04 | End: 2020-09-07 | Stop reason: HOSPADM

## 2020-09-04 RX ORDER — FUROSEMIDE 10 MG/ML
20 INJECTION INTRAMUSCULAR; INTRAVENOUS ONCE
Status: COMPLETED | OUTPATIENT
Start: 2020-09-04 | End: 2020-09-04

## 2020-09-04 RX ORDER — POTASSIUM CHLORIDE 750 MG/1
20 CAPSULE, EXTENDED RELEASE ORAL 2 TIMES DAILY WITH MEALS
Status: DISCONTINUED | OUTPATIENT
Start: 2020-09-05 | End: 2020-09-07 | Stop reason: HOSPADM

## 2020-09-04 RX ORDER — POTASSIUM CHLORIDE 750 MG/1
20 CAPSULE, EXTENDED RELEASE ORAL
Status: DISCONTINUED | OUTPATIENT
Start: 2020-09-04 | End: 2020-09-04

## 2020-09-04 RX ORDER — PREGABALIN 25 MG/1
25 CAPSULE ORAL EVERY 12 HOURS SCHEDULED
Status: DISCONTINUED | OUTPATIENT
Start: 2020-09-04 | End: 2020-09-07 | Stop reason: HOSPADM

## 2020-09-04 RX ADMIN — METOPROLOL TARTRATE 50 MG: 50 TABLET ORAL at 08:30

## 2020-09-04 RX ADMIN — HYDROCODONE BITARTRATE AND ACETAMINOPHEN 1 TABLET: 5; 325 TABLET ORAL at 00:08

## 2020-09-04 RX ADMIN — KETOROLAC TROMETHAMINE 15 MG: 15 INJECTION, SOLUTION INTRAMUSCULAR; INTRAVENOUS at 08:42

## 2020-09-04 RX ADMIN — IPRATROPIUM BROMIDE 0.5 MG: 0.5 SOLUTION RESPIRATORY (INHALATION) at 14:37

## 2020-09-04 RX ADMIN — TAZOBACTAM SODIUM AND PIPERACILLIN SODIUM 3.38 G: 375; 3 INJECTION, SOLUTION INTRAVENOUS at 15:09

## 2020-09-04 RX ADMIN — ESTROGENS, CONJUGATED 0.3 MG: 0.3 TABLET, FILM COATED ORAL at 08:30

## 2020-09-04 RX ADMIN — POTASSIUM CHLORIDE 20 MEQ: 750 CAPSULE, EXTENDED RELEASE ORAL at 17:23

## 2020-09-04 RX ADMIN — HYDROCODONE BITARTRATE AND ACETAMINOPHEN 1 TABLET: 5; 325 TABLET ORAL at 06:14

## 2020-09-04 RX ADMIN — FUROSEMIDE 20 MG: 10 INJECTION, SOLUTION INTRAMUSCULAR; INTRAVENOUS at 09:59

## 2020-09-04 RX ADMIN — KETOROLAC TROMETHAMINE 15 MG: 15 INJECTION, SOLUTION INTRAMUSCULAR; INTRAVENOUS at 15:09

## 2020-09-04 RX ADMIN — FAMOTIDINE 20 MG: 20 TABLET, FILM COATED ORAL at 08:30

## 2020-09-04 RX ADMIN — DOCUSATE SODIUM 100 MG: 100 CAPSULE ORAL at 08:30

## 2020-09-04 RX ADMIN — AMITRIPTYLINE HYDROCHLORIDE 150 MG: 75 TABLET, FILM COATED ORAL at 21:05

## 2020-09-04 RX ADMIN — TAZOBACTAM SODIUM AND PIPERACILLIN SODIUM 3.38 G: 375; 3 INJECTION, SOLUTION INTRAVENOUS at 08:30

## 2020-09-04 RX ADMIN — METHOCARBAMOL 500 MG: 500 TABLET ORAL at 08:42

## 2020-09-04 RX ADMIN — KETOROLAC TROMETHAMINE 15 MG: 15 INJECTION, SOLUTION INTRAMUSCULAR; INTRAVENOUS at 21:05

## 2020-09-04 RX ADMIN — SODIUM CHLORIDE, PRESERVATIVE FREE 10 ML: 5 INJECTION INTRAVENOUS at 08:32

## 2020-09-04 RX ADMIN — PREGABALIN 25 MG: 25 CAPSULE ORAL at 08:42

## 2020-09-04 RX ADMIN — LEVOTHYROXINE SODIUM 112 MCG: 112 TABLET ORAL at 06:32

## 2020-09-04 RX ADMIN — METHOCARBAMOL 500 MG: 500 TABLET ORAL at 21:05

## 2020-09-04 RX ADMIN — ALBUTEROL SULFATE 1.25 MG: 1.25 SOLUTION RESPIRATORY (INHALATION) at 14:37

## 2020-09-04 RX ADMIN — POLYETHYLENE GLYCOL 3350 17 G: 17 POWDER, FOR SOLUTION ORAL at 08:30

## 2020-09-04 RX ADMIN — ENOXAPARIN SODIUM 40 MG: 40 INJECTION SUBCUTANEOUS at 06:14

## 2020-09-04 RX ADMIN — LIDOCAINE 2 PATCH: 50 PATCH CUTANEOUS at 08:32

## 2020-09-04 RX ADMIN — VANCOMYCIN HYDROCHLORIDE 1250 MG: 10 INJECTION, POWDER, LYOPHILIZED, FOR SOLUTION INTRAVENOUS at 08:30

## 2020-09-04 RX ADMIN — FAMOTIDINE 20 MG: 20 TABLET, FILM COATED ORAL at 17:23

## 2020-09-04 RX ADMIN — METHOCARBAMOL 500 MG: 500 TABLET ORAL at 15:09

## 2020-09-04 RX ADMIN — HYDROCODONE BITARTRATE AND ACETAMINOPHEN 1 TABLET: 5; 325 TABLET ORAL at 18:09

## 2020-09-04 RX ADMIN — POTASSIUM CHLORIDE 20 MEQ: 750 CAPSULE, EXTENDED RELEASE ORAL at 12:10

## 2020-09-04 RX ADMIN — ALBUTEROL SULFATE 1.25 MG: 1.25 SOLUTION RESPIRATORY (INHALATION) at 10:17

## 2020-09-04 RX ADMIN — PREGABALIN 25 MG: 25 CAPSULE ORAL at 21:05

## 2020-09-04 RX ADMIN — VANCOMYCIN HYDROCHLORIDE 1250 MG: 10 INJECTION, POWDER, LYOPHILIZED, FOR SOLUTION INTRAVENOUS at 15:10

## 2020-09-04 RX ADMIN — IPRATROPIUM BROMIDE 0.5 MG: 0.5 SOLUTION RESPIRATORY (INHALATION) at 22:35

## 2020-09-04 RX ADMIN — MORPHINE SULFATE 1 MG: 2 INJECTION, SOLUTION INTRAMUSCULAR; INTRAVENOUS at 03:36

## 2020-09-04 RX ADMIN — IPRATROPIUM BROMIDE 0.5 MG: 0.5 SOLUTION RESPIRATORY (INHALATION) at 10:17

## 2020-09-04 RX ADMIN — TAZOBACTAM SODIUM AND PIPERACILLIN SODIUM 3.38 G: 375; 3 INJECTION, SOLUTION INTRAVENOUS at 00:38

## 2020-09-04 RX ADMIN — SODIUM CHLORIDE, PRESERVATIVE FREE 10 ML: 5 INJECTION INTRAVENOUS at 21:05

## 2020-09-04 RX ADMIN — FUROSEMIDE 20 MG: 10 INJECTION, SOLUTION INTRAVENOUS at 08:30

## 2020-09-04 RX ADMIN — ALBUTEROL SULFATE 1.25 MG: 1.25 SOLUTION RESPIRATORY (INHALATION) at 22:35

## 2020-09-04 RX ADMIN — CYCLOBENZAPRINE HYDROCHLORIDE 5 MG: 5 TABLET, FILM COATED ORAL at 00:38

## 2020-09-04 RX ADMIN — POTASSIUM CHLORIDE 20 MEQ: 750 CAPSULE, EXTENDED RELEASE ORAL at 08:30

## 2020-09-04 RX ADMIN — METOPROLOL TARTRATE 50 MG: 50 TABLET ORAL at 21:05

## 2020-09-04 NOTE — PROGRESS NOTES
[facility] Medicine Services  INPATIENT PROGRESS NOTE    Length of Stay: 5  Date of Admission: 8/30/2020  Primary Care Physician: Raegan Dubose DO    Subjective   Chief Complaint: Pneumonia/loculated pleural effusion/shortness of breath/pancreatitis/leukocytosis/fever.    HPI   T-max 98.7.  T-current 98.3.  Blood pressure stable.  Tachycardia resolved.  Patient is off oxygen.    Review of Systems   Constitutional: Positive for activity change, appetite change and fatigue. Negative for chills and fever.   HENT: Negative for hearing loss, nosebleeds, tinnitus and trouble swallowing.    Eyes: Negative for visual disturbance.   Respiratory: Patient is off oxygen.  Chest pain from chest tube.  Coughing has improved.  Cardiovascular: Chest pain from chest tube.  No palpitations and leg swelling.   Gastrointestinal: Negative for abdominal distention, abdominal pain, blood in stool, constipation, diarrhea, nausea and vomiting.   Endocrine: Negative for cold intolerance, heat intolerance, polydipsia, polyphagia and polyuria.   Genitourinary: Negative for decreased urine volume, difficulty urinating, dysuria, flank pain, frequency and hematuria.   Musculoskeletal: Positive for arthralgias, gait problem and myalgias. Negative for joint swelling.   Skin: Negative for rash.   Allergic/Immunologic: Negative for immunocompromised state.   Neurological: Positive for weakness. Negative for dizziness, syncope, light-headedness and headaches.   Hematological: Negative for adenopathy. Does not bruise/bleed easily.   Psychiatric/Behavioral: Negative for confusion and sleep disturbance. The patient is not nervous/anxious.     All pertinent negatives and positives are as above. All other systems have been reviewed and are negative unless otherwise stated.     Objective    Temp:  [98 °F (36.7 °C)-98.7 °F (37.1 °C)] 98.3 °F (36.8 °C)  Heart Rate:  [79-94] 91  Resp:  [16-23] 20  BP:  ()/(57-79) 128/70    Intake/Output Summary (Last 24 hours) at 9/4/2020 0852  Last data filed at 9/4/2020 0437  Gross per 24 hour   Intake 240 ml   Output 5395 ml   Net -5155 ml     Physical Exam  Constitutional: She is oriented to person, place, and time. She appears well-developed.   HENT:   Head: Normocephalic.   Eyes: Pupils are equal, round, and reactive to light. Conjunctivae are normal.   Neck: Neck supple. No JVD present.   Cardiovascular: Normal heart sounds and intact distal pulses. Exam reveals no gallop and no friction rub.   No murmur heard.  Pulmonary/Chest: No respiratory distress. She has no rales. She exhibits no tenderness.  Decrease in breath sound/slight rales on right.     Patient is off oxygen. 2 Chest tubes in place right chest.  Abdominal: Soft. Bowel sounds are normal. She exhibits no distension.  Nontender abdomen.  There is no rebound and no guarding.   Musculoskeletal: Normal range of motion. She exhibits no edema, tenderness or deformity.   Neurological: She is alert and oriented to person, place, and time. She displays normal reflexes. No cranial nerve deficit. She exhibits abnormal muscle tone. Coordination abnormal.   Skin: Skin is warm and dry. Capillary refill takes 2 to 3 seconds. No rash noted.   Psychiatric: She has a normal mood and affect. Her behavior is normal. Judgment and thought content normal.   Nursing note and vitals reviewed.  Results Review:  Lab Results (last 24 hours)     Procedure Component Value Units Date/Time    Body Fluid Culture - Body Fluid, Bronchus [[id number]] Collected:  09/02/20 1542    Specimen:  Body Fluid from Bronchus Updated:  09/04/20 0742     Body Fluid Culture No growth at 2 days     Gram Stain Moderate (3+) WBCs seen      No organisms seen    Respiratory Culture - Sputum, Cough [[id number]] Collected:  09/01/20 1920    Specimen:  Sputum from Cough Updated:  09/04/20 0741     Respiratory Culture Scant growth (1+) Normal Respiratory Sharla: NO  S.aureus/MRSA or Pseudomonas aeruginosa     Gram Stain Greater than 25 WBCs per low power field      Less than 25 Epithelial cells per low power field      Rare (1+) Mixed gram positive simran    Vancomycin, Trough [[id number]]  (Normal) Collected:  09/04/20 0657    Specimen:  Blood Updated:  09/04/20 0730     Vancomycin Trough 7.00 mcg/mL     Comprehensive Metabolic Panel [[id number]]  (Abnormal) Collected:  09/04/20 0657    Specimen:  Blood Updated:  09/04/20 0728     Glucose 117 mg/dL      BUN 11 mg/dL      Creatinine 0.29 mg/dL      Sodium 135 mmol/L      Potassium 3.4 mmol/L      Chloride 94 mmol/L      CO2 31.0 mmol/L      Calcium 7.9 mg/dL      Total Protein 5.3 g/dL      Albumin 2.30 g/dL      ALT (SGPT) 43 U/L      AST (SGOT) 38 U/L      Alkaline Phosphatase 359 U/L      Total Bilirubin 0.4 mg/dL      eGFR Non African Amer >150 mL/min/1.73      Globulin 3.0 gm/dL      A/G Ratio 0.8 g/dL      BUN/Creatinine Ratio 37.9     Anion Gap 10.0 mmol/L     Narrative:       GFR Normal >60  Chronic Kidney Disease <60  Kidney Failure <15      CBC & Differential [[id number]] Collected:  09/04/20 0657    Specimen:  Blood Updated:  09/04/20 0706    Narrative:       The following orders were created for panel order CBC & Differential.  Procedure                               Abnormality         Status                     ---------                               -----------         ------                     CBC Auto Differential[[id number]]        Abnormal            Final result                 Please view results for these tests on the individual orders.    CBC Auto Differential [[id number]]  (Abnormal) Collected:  09/04/20 0657    Specimen:  Blood Updated:  09/04/20 0706     WBC 17.83 10*3/mm3      RBC 3.27 10*6/mm3      Hemoglobin 9.8 g/dL      Hematocrit 28.6 %      MCV 87.5 fL      MCH 30.0 pg      MCHC 34.3 g/dL      RDW 13.6 %      RDW-SD 44.1 fl      MPV 8.1 fL      Platelets 765 10*3/mm3      Neutrophil % 83.9 %       Lymphocyte % 7.3 %      Monocyte % 7.6 %      Eosinophil % 0.2 %      Basophil % 0.2 %      Immature Grans % 0.8 %      Neutrophils, Absolute 14.98 10*3/mm3      Lymphocytes, Absolute 1.30 10*3/mm3      Monocytes, Absolute 1.35 10*3/mm3      Eosinophils, Absolute 0.03 10*3/mm3      Basophils, Absolute 0.03 10*3/mm3      Immature Grans, Absolute 0.14 10*3/mm3      nRBC 0.0 /100 WBC     Body Fluid Culture - Body Fluid, Pleural Cavity [[id number]]  (Abnormal)  (Susceptibility) Collected:  09/01/20 1346    Specimen:  Body Fluid from Pleural Cavity Updated:  09/04/20 0626     Body Fluid Culture Light growth (2+) Streptococcus intermedius     Gram Stain Many (4+) WBCs seen      No organisms seen    Susceptibility      Streptococcus intermedius     LEXX     Ceftriaxone Susceptible     Penicillin G Susceptible     Vancomycin Susceptible                    Lipase, Body Fluid - Pleural Fluid, Pleural Cavity [[id number]] Collected:  09/01/20 1347    Specimen:  Pleural Fluid from Pleural Cavity Updated:  09/03/20 1410     Lipase, Fluid 249 U/L      Comment: INTERPRETIVE INFORMATION: Lipase, Fluid  For information on body fluid reference ranges and/or interpretive  guidance visit [email or web address]  Test developed and characteristics determined by [facility]. See Compliance Statement B: [employer]/CS       Narrative:       Performed at:  01 - [facility]  [address] [place] [address]  [id number]  : Arsen Tavarez MD, Phone:  [phone]    Body Fluid Culture - Body Fluid, Bronchus [[id number]] Collected:  09/02/20 1539    Specimen:  Body Fluid from Bronchus Updated:  09/03/20 1216     Body Fluid Culture No growth     Gram Stain Moderate (3+) WBCs seen      Few (2+) Gram negative bacilli    Tissue / Bone Culture - Tissue, Pleural Cavity [[id number]] Collected:  09/02/20 1614    Specimen:  Tissue from Pleural Cavity Updated:  09/03/20 1215     Tissue Culture No growth     Gram Stain Few  (2+) WBCs seen      No organisms seen    Body Fluid Culture - Body Fluid, Pleural Cavity [[id number]] Collected:  20 1608    Specimen:  Body Fluid from Pleural Cavity Updated:  20 1215     Body Fluid Culture No growth     Gram Stain Moderate (3+) WBCs seen      Few (2+) Gram positive cocci    Non-gynecologic Cytology [[id number]] Collected:  20 1346    Specimen:  Body Fluid from Pleural Cavity Updated:  20 1213     Case Report --     Non-gynecologic Cytology                          Case: [id number]                                  Authorizing Provider:  Odalis Marie APRN     Collected:           2020 01:46 PM          Ordering Location:     [facility]     Received:            2020 08:04 AM                                 CARDIAC CARE                                                                 Pathologist:           Butch Hobson MD                                                   Specimen:    Pleural Cavity, pleural fluid                                                               Final Diagnosis --     Right pleural cavity, thoracentesis:     Negative for malignancy.     Acute inflammation.       Gross Description --     Received fresh in the laboratory in a container labeled with patient name and  designated as pleural fluid.  8 mls of blood tinged fluid.  1 thin prep slide and 1 cell block prepared.         Microscopic Description --     Review of the right pleural fluid reveals base with numerous segmented neutrophils.  There are benign mesothelial cells.  There is no evidence of malignancy.      Blood Culture With KENISHA - Blood, Arm, Right [[id number]] Collected:  20 0638    Specimen:  Blood from Arm, Right Updated:  20 0900     Blood Culture No growth at 3 days    Blood Culture With KENISHA - Blood, Hand, Right [[id number]] Collected:  20 0614    Specimen:  Blood from Hand, Right Updated:  20 0900     Blood Culture No  growth at 3 days           Cultures:  Blood Culture   Date Value Ref Range Status   08/31/2020 No growth at 3 days  Preliminary   08/31/2020 No growth at 3 days  Preliminary     Respiratory Culture   Date Value Ref Range Status   09/01/2020   Final    Scant growth (1+) Normal Respiratory Sharla: NO S.aureus/MRSA or Pseudomonas aeruginosa       Radiology Data:    Imaging Results (Last 24 Hours)     Procedure Component Value Units Date/Time    XR Chest 1 View [[id number]] Collected:  09/04/20 0713     Updated:  09/04/20 0718    Narrative:       Frontal upright radiograph of the chest 9/4/2020 3:55 AM CDT     HISTORY: Empyema on right, s/p right VATS washout     COMPARISON: Chest exam dated 9/3/2020.     FINDINGS:      There are 2 right-sided chest tubes in stable position, one coursing  towards the right apex and a second towards the base. No pneumothorax.  There are bilateral perihilar and bibasilar consolidative infiltrates  which are not significantly changed. There appears to be residual trace  right-sided effusion/empyema. Trace left-sided effusion also  appreciated. Heart size is stable. The pulmonary vasculature are  nondilated. Underlying spinal scoliotic curvature. No acute bony  abnormality.       Impression:       1. No significant interval change. Right-sided chest tubes are in stable  position. No pneumothorax.  2. Bilateral perihilar and bibasilar patchy infiltrates are not  significant change. Residual right-sided effusion/empyema is also  stable.     This report was finalized on 09/04/2020 07:14 by Dr Martinez Goncalves, .          No Known Allergies    Scheduled meds:     albuterol 1.25 mg Nebulization 4x Daily - RT   amitriptyline 150 mg Oral Nightly   docusate sodium 100 mg Oral Daily   enoxaparin 40 mg Subcutaneous Q24H   estrogens (conjugated) 0.3 mg Oral Daily   famotidine 20 mg Oral BID AC   furosemide 20 mg Intravenous BID   furosemide 20 mg Intravenous Once   ipratropium 0.5 mg Nebulization 4x Daily  - RT   ketorolac 15 mg Intravenous Q6H   levothyroxine 112 mcg Oral Q AM   lidocaine 2 patch Transdermal Q24H   methocarbamol 500 mg Oral TID   metoprolol tartrate 50 mg Oral Q12H   piperacillin-tazobactam 3.375 g Intravenous Q8H   polyethylene glycol 17 g Oral Daily   potassium chloride 20 mEq Oral TID With Meals   pregabalin 25 mg Oral Q12H   sodium chloride 10 mL Intravenous Q12H   vancomycin 1,250 mg Intravenous Q12H       PRN meds:  •  acetaminophen **OR** acetaminophen  •  benzonatate  •  cyclobenzaprine  •  HYDROcodone-acetaminophen  •  Morphine **AND** naloxone  •  ondansetron  •  ondansetron **OR** ondansetron  •  Pharmacy to Dose enoxaparin (LOVENOX)  •  sodium chloride    Assessment/Plan       Acute pancreatitis    Pneumonitis    Pleural effusion on right    Elevated liver enzymes    Empyema, right (CMS/HCC)    Leukocytosis      Plan:  Right loculated pleural effusion-empyema/pneumonia/right pneumonitis/coughing.  Continue Zosyn, and Vancomycin.  Tessalon Perle PRN.  Cont CT surgeon.  Atrovent nebs.  Incentive spirometer.  Lasix 40 mg x 1.  CT scan of the chest-Moderate lobulated/loculated right pleural effusion with loculated fluid in the right major fissure, scattered bilateral patchy pulmonary opacities representing multifocal pneumonia, trapped portions of the right lower lobe with compressive atelectasis of right middle and lower lobes, reactive mediastinal lymph nodes.  Status post thoracentesis 9/1/2020.   Status post VATS 9/2/2020.  2 Chest tubes in place.  Leukocytosis- improving.       Acute pancreatitis.  Lipase resolved.  GI sign off.   CT scan abdomen pelvic 8/30/2020 at [facility]- mild acute pancreatitis-no suggestion of pancreatic necrosis or pseudocyst formation, cystocele, status post hysterectomy and bilateral salpingo-oophorectomy     Elevated liver enzyme/gallbladder sludge.  Liver enzyme  improving.  Acute hepatitis panel- neg.   Ultrasound of the liver-Sludge is present in the  gallbladder, liver is unremarkable, hepatopedal flow is present in the portal vein.     Hypertension/tachycardia.  Tachycardia resolved.  Cont Lopressor.    Echocardiogram-ejection fraction 60-65%, mild mitral valve regurgitation.     Hypokalemia. Po potassium.    Anemia.  Hemoglobin stable.  No sign of acute bleed.     Depression.  Elavil at night.      Postmenopausal.  Premarin.      Hypothyroidism.  Continue Synthroid.     Pain control.  Morphine as needed.  Flexeril as needed. Percocet PRN.     Prophylaxis Lovenox.     Nausea.  Zofran PRN.  Pepcid.     Nutrition.  Advance diet as tolerated.     Blood cultures-no growth 3 days.  Body fluid culture- light growth of Streptococcus intermedius.  Anaerobic culture pending.  Fungal culture pending.  MRSA DNA probe-negative.  COVID-19-negative.  Respiratory PCR-negative.  Respiratory culture mixed gram-positive simran.  Cytology- right pleural cavity thoracentesis - negative for malignancy, acute inflammation..    Tissue culture-no growth.  Fungal culture pending.     Discharge Plannin-5 days.  Transfer from [facility].     Electronically signed by Randolph Liang MD, 20, 8:52 AM.

## 2020-09-04 NOTE — PLAN OF CARE
Problem: Patient Care Overview  Goal: Plan of Care Review  Outcome: Ongoing (interventions implemented as appropriate)  Flowsheets (Taken 9/4/2020 0241)  Progress: no change  Plan of Care Reviewed With: patient  Outcome Summary: Patient ambulated in hallway once this shift with assist of two. Pt c/o pain this shift. PRN pain medications given. See MAR. IID between abx. Britt catheter to BSD. Sinus on tele. Chest tubes in place. See I&O. VSS. Safety maintained. Will continue to monitor.

## 2020-09-04 NOTE — PLAN OF CARE
Problem: Patient Care Overview  Goal: Plan of Care Review  Outcome: Ongoing (interventions implemented as appropriate)  Flowsheets  Taken 9/4/2020 0241 by Dustin Casas, RN  Plan of Care Reviewed With: patient  Taken 9/4/2020 1439 by Narda Bolden, RN  Outcome Summary: pt ambulated in hall this shift with assist of 1 pt became dizzy this afternoon bp checked was low md called orders recieved  meds given as ordered

## 2020-09-04 NOTE — PROGRESS NOTES
Discharge Planning Assessment  [facility]     Patient Name: Heidi Fernandez  MRN: [id number]  Today's Date: 9/4/2020    Admit Date: 8/30/2020    Discharge Needs Assessment     Row Name 09/04/20 1324       Living Environment    Lives With  spouse    Current Living Arrangements  home/apartment/condo    Primary Care Provided by  self    Provides Primary Care For  no one    Family Caregiver if Needed  spouse    Quality of Family Relationships  helpful;involved;supportive    Able to Return to Prior Arrangements  yes       Resource/Environmental Concerns    Resource/Environmental Concerns  none       Transition Planning    Patient/Family Anticipates Transition to  home with family    Patient/Family Anticipated Services at Transition  none    Transportation Anticipated  family or friend will provide       Discharge Needs Assessment    Concerns to be Addressed  adjustment to diagnosis/illness    Equipment Currently Used at Home  none    Anticipated Changes Related to Illness  none    Equipment Needed After Discharge  none    Discharge Coordination/Progress  Pt lives at home with her spouse. Plan is to return there at d/c but pt currently has 2 chest tubes. She has been ambulating in the halls. Pt has rx coverage for her medication. Will follow in case d/c needs arise.         Discharge Plan    No documentation.       Destination      Coordination has not been started for this encounter.      Durable Medical Equipment      Coordination has not been started for this encounter.      Dialysis/Infusion      Coordination has not been started for this encounter.      Home Medical Care      Coordination has not been started for this encounter.      Therapy      Coordination has not been started for this encounter.      Community Resources      Coordination has not been started for this encounter.          Demographic Summary    No documentation.       Functional Status    No documentation.       Psychosocial    No documentation.        Abuse/Neglect    No documentation.       Legal    No documentation.       Substance Abuse    No documentation.       Patient Forms    No documentation.           LACI Banuelos

## 2020-09-04 NOTE — PROGRESS NOTES
"Patient name: Heidi Fernandez  Patient : 1970  VISIT # [id number]  MR #[id number]    Procedure:Procedure(s):  THORACOSCOPY VIDEO ASSISTED WITH RIGHT DECORTICATION; RIGHT INTERCOSTAL NERVE BLOCK  BRONCHOSCOPY  Procedure Date:2020  POD:2 Day Post-Op    Subjective     Patient is resting in bed tolerating room air with O2 94%.  WBC 17 today, continuing to improve.  Ongoing pain at chest tube site.  Patient states she ambulated in the hallway 1 time yesterday.  IS up to 500 with prompting.  Chest tube dressing removed, tubes stripped, and dressing reapplied.         Objective     Visit Vitals  /70 (BP Location: Left arm, Patient Position: Lying)   Pulse 91   Temp 98.3 °F (36.8 °C) (Oral)   Resp 20   Ht 170.2 cm (67\")   Wt 73.7 kg (162 lb 6 oz)   LMP 2012   SpO2 94%   BMI 25.43 kg/m²       Intake/Output Summary (Last 24 hours) at 2020 0831  Last data filed at 2020 0437  Gross per 24 hour   Intake 240 ml   Output 5395 ml   Net -5155 ml     CT 1: 10 ml/24 hrs, serosanguineous, no air leak  CT 2: 35 ml/24 hrs, serosanguineous, no air leak    Lab:         IMAGES:       Imaging Results (Last 24 Hours)     Procedure Component Value Units Date/Time    XR Chest 1 View [[id number]] Collected:  20     Updated:  20    Narrative:       Frontal upright radiograph of the chest 2020 3:55 AM CDT     HISTORY: Empyema on right, s/p right VATS washout     COMPARISON: Chest exam dated 9/3/2020.     FINDINGS:      There are 2 right-sided chest tubes in stable position, one coursing  towards the right apex and a second towards the base. No pneumothorax.  There are bilateral perihilar and bibasilar consolidative infiltrates  which are not significantly changed. There appears to be residual trace  right-sided effusion/empyema. Trace left-sided effusion also  appreciated. Heart size is stable. The pulmonary vasculature are  nondilated. Underlying spinal scoliotic curvature. No acute " bony  abnormality.       Impression:       1. No significant interval change. Right-sided chest tubes are in stable  position. No pneumothorax.  2. Bilateral perihilar and bibasilar patchy infiltrates are not  significant change. Residual right-sided effusion/empyema is also  stable.     This report was finalized on 09/04/2020 07:14 by Dr Martinez Goncalves, .           Physical Exam:  General: Alert, oriented. No apparent distress.   Cardiovascular: Regular rate and rhythm without murmur, rubs, or gallops.    Pulmonary: Diminished breath sounds on the right, no wheezing, rubs, or rales.  Chest: Thoracic incisions clean, dry, and intact with dressing in place. Chest tubes to 20 cm H2O suction. No air leak. Fluid is serosanguineous.    Abdomen: Soft, non distended, and non tender.  Extremities: Warm, moves all extremities. Mild edema BLE  Neurologic:  Grossly intact with no focal deficits.        Impression:  Empyema, right  Pleural effusion, right  Leukocytosis  Pneumonitis    Plan:  Continue chest tubes to 20 cm H2O suction, tubes stripped and dressing changed  Multimodality pain control regimen, add Lyrica and Toradol  Continue diuresis, additional 20mg Lasix IV x 1 dose now  Increase potassium  Weigh patient  DC delroy  Encourage pulmonary toilet and ambulation, walk 3-4 times daily  DW patient and nursing     ANA John  09/04/20  08:31

## 2020-09-04 NOTE — PROGRESS NOTES
"Pharmacy Dosing Service  Pharmacokinetics  Vancomycin Follow-up Evaluation     Assessment/Action/Plan:  9/4/2020 0657 Vancomycin trough = 7 (10.25 hours post 1250mg dose)   Vancomycin dose adjusted to 1250mg iv q8h.      Per [email or web address] calculator:  Regimen: 1250 mg every 8 hours   Start time: 16:30 on 09/04/2020  Exposure target: AUC24 (range)400-600 mg/L.hr  AUC24,ss: 522 mg/L.hr  PAUC*: 87 %  Ctrough,ss: 13.4 mg/L  Pconc*: 11 %  Tox.: 9 %     Pharmacy will continue to monitor renal function and adjust dose accordingly.      Subjective:  Heidi Fernandez is a 49 y.o. female currently on Vancomycin for the treatment of pneumonia, day 3 of therapy (Current vancomycin end date: 9/6/2020).    Objective:  Ht: 170.2 cm (67\"); Wt: 73.7 kg (162 lb 6 oz)  Estimated Creatinine Clearance: 267 mL/min (A) (by C-G formula based on SCr of 0.29 mg/dL (L)).   Lab Results   Component Value Date    CREATININE 0.29 (L) 09/04/2020    CREATININE 0.23 (L) 09/03/2020    CREATININE 0.39 (L) 09/02/2020      Lab Results   Component Value Date    WBC 17.83 (H) 09/04/2020    WBC 19.50 (H) 09/03/2020    WBC 21.80 (H) 09/02/2020         Lab Results   Component Value Date    VANCOTROUGH 7.00 09/04/2020       Culture Results:  Microbiology Results (last 10 days)       Procedure Component Value - Date/Time    Tissue / Bone Culture - Tissue, Pleural Cavity [[id number]] Collected:  09/02/20 1614    Lab Status:  Preliminary result Specimen:  Tissue from Pleural Cavity Updated:  09/03/20 1215     Tissue Culture No growth     Gram Stain Few (2+) WBCs seen      No organisms seen    Body Fluid Culture - Body Fluid, Pleural Cavity [[id number]] Collected:  09/02/20 1608    Lab Status:  Preliminary result Specimen:  Body Fluid from Pleural Cavity Updated:  09/03/20 1215     Body Fluid Culture No growth     Gram Stain Moderate (3+) WBCs seen      Few (2+) Gram positive cocci    Body Fluid Culture - Body Fluid, Bronchus [[id number]] Collected:  09/02/20 1542    Lab " Status:  Preliminary result Specimen:  Body Fluid from Bronchus Updated:  09/04/20 0742     Body Fluid Culture No growth at 2 days     Gram Stain Moderate (3+) WBCs seen      No organisms seen    Body Fluid Culture - Body Fluid, Bronchus [[id number]] Collected:  09/02/20 1539    Lab Status:  Preliminary result Specimen:  Body Fluid from Bronchus Updated:  09/03/20 1216     Body Fluid Culture No growth     Gram Stain Moderate (3+) WBCs seen      Few (2+) Gram negative bacilli    MRSA Screen, PCR (Inpatient) - Swab, Nares [[id number]]  (Normal) Collected:  09/02/20 1029    Lab Status:  Final result Specimen:  Swab from Nares Updated:  09/02/20 1218     MRSA PCR No MRSA Detected    Respiratory Culture - Sputum, Cough [[id number]] Collected:  09/01/20 1920    Lab Status:  Final result Specimen:  Sputum from Cough Updated:  09/04/20 0741     Respiratory Culture Scant growth (1+) Normal Respiratory Simran: NO S.aureus/MRSA or Pseudomonas aeruginosa     Gram Stain Greater than 25 WBCs per low power field      Less than 25 Epithelial cells per low power field      Rare (1+) Mixed gram positive simran    Body Fluid Culture - Body Fluid, Pleural Cavity [[id number]]  (Abnormal)  (Susceptibility) Collected:  09/01/20 1346    Lab Status:  Final result Specimen:  Body Fluid from Pleural Cavity Updated:  09/04/20 0626     Body Fluid Culture Light growth (2+) Streptococcus intermedius     Gram Stain Many (4+) WBCs seen      No organisms seen    Susceptibility        Streptococcus intermedius     LEXX     Ceftriaxone Susceptible     Penicillin G Susceptible     Vancomycin Susceptible                      COVID PRE-OP / PRE-PROCEDURE SCREENING ORDER (NO ISOLATION) - Swab, Nasopharynx [[id number]] Collected:  08/31/20 1029    Lab Status:  Final result Specimen:  Swab from Nasopharynx Updated:  08/31/20 1128    Narrative:       The following orders were created for panel order COVID PRE-OP / PRE-PROCEDURE SCREENING ORDER (NO ISOLATION) - Swab,  Nasopharynx.  Procedure                               Abnormality         Status                     ---------                               -----------         ------                     Respiratory Panel PCR w/...[[id number]]  Normal              Final result                 Please view results for these tests on the individual orders.    Respiratory Panel PCR w/COVID-19(SARS-CoV-2) [facility] In-House, NP Swab in UTM/VTM, 3-4 HR TAT - Swab, Nasopharynx [[id number]]  (Normal) Collected:  08/31/20 1029    Lab Status:  Final result Specimen:  Swab from Nasopharynx Updated:  08/31/20 1128     ADENOVIRUS, PCR Not Detected     Coronavirus 229E Not Detected     Coronavirus HKU1 Not Detected     Coronavirus NL63 Not Detected     Coronavirus OC43 Not Detected     COVID19 Not Detected     Human Metapneumovirus Not Detected     Human Rhinovirus/Enterovirus Not Detected     Influenza A PCR Not Detected     Influenza A H1 Not Detected     Influenza A H1 2009 PCR Not Detected     Influenza A H3 Not Detected     Influenza B PCR Not Detected     Parainfluenza Virus 1 Not Detected     Parainfluenza Virus 2 Not Detected     Parainfluenza Virus 3 Not Detected     Parainfluenza Virus 4 Not Detected     RSV, PCR Not Detected     Bordetella pertussis pcr Not Detected     Bordetella parapertussis PCR Not Detected     Chlamydophila pneumoniae PCR Not Detected     Mycoplasma pneumo by PCR Not Detected    Narrative:       Fact sheet for providers: https://docs.[employer]/wp-content/uploads/AHD7531-1121-OT7.1-EUA-Provider-Fact-Sheet-3.pdf    Fact sheet for patients: https://docs.[employer]/wp-content/uploads/LFZ4755-9838-ME9.1-EUA-Patient-Fact-Sheet-1.pdf    Blood Culture With KENISHA - Blood, Arm, Right [[id number]] Collected:  08/31/20 0638    Lab Status:  Preliminary result Specimen:  Blood from Arm, Right Updated:  09/03/20 0900     Blood Culture No growth at 3 days    Blood Culture With KENISHA - Blood, Hand, Right [[id number]]  Collected:  08/31/20 0614    Lab Status:  Preliminary result Specimen:  Blood from Hand, Right Updated:  09/03/20 0900     Blood Culture No growth at 3 days    COVID-19, [facility] IN-HOUSE,NP Swab (NO TRANSPORT MEDIA) 2 HR TAT - Swab, Nasopharynx [[id number]]  (Normal) Collected:  08/31/20 0114    Lab Status:  Final result Specimen:  Swab from Nasopharynx Updated:  08/31/20 0137     COVID19 Not Detected    Narrative:       Fact sheet for providers: https://www.fda.gov/media/065573/download     Fact sheet for patients: https://www.fda.gov/media/226555/download            Arnol Sterling, PharmD   09/04/20 08:59

## 2020-09-05 ENCOUNTER — APPOINTMENT (OUTPATIENT)
Dept: GENERAL RADIOLOGY | Facility: HOSPITAL | Age: 50
End: 2020-09-05

## 2020-09-05 LAB
ALBUMIN SERPL-MCNC: 2.3 G/DL (ref 3.5–5.2)
ALBUMIN/GLOB SERPL: 0.8 G/DL
ALP SERPL-CCNC: 324 U/L (ref 39–117)
ALT SERPL W P-5'-P-CCNC: 38 U/L (ref 1–33)
ANION GAP SERPL CALCULATED.3IONS-SCNC: 12 MMOL/L (ref 5–15)
AST SERPL-CCNC: 32 U/L (ref 1–32)
BACTERIA FLD CULT: ABNORMAL
BACTERIA FLD CULT: ABNORMAL
BACTERIA FLD CULT: NORMAL
BACTERIA SPEC AEROBE CULT: ABNORMAL
BACTERIA SPEC AEROBE CULT: NORMAL
BACTERIA SPEC AEROBE CULT: NORMAL
BASOPHILS # BLD AUTO: 0.05 10*3/MM3 (ref 0–0.2)
BASOPHILS NFR BLD AUTO: 0.3 % (ref 0–1.5)
BILIRUB SERPL-MCNC: 0.4 MG/DL (ref 0–1.2)
BUN SERPL-MCNC: 8 MG/DL (ref 6–20)
BUN/CREAT SERPL: 25 (ref 7–25)
CALCIUM SPEC-SCNC: 7.8 MG/DL (ref 8.6–10.5)
CHLORIDE SERPL-SCNC: 95 MMOL/L (ref 98–107)
CO2 SERPL-SCNC: 27 MMOL/L (ref 22–29)
CREAT SERPL-MCNC: 0.32 MG/DL (ref 0.57–1)
DEPRECATED RDW RBC AUTO: 43.8 FL (ref 37–54)
EOSINOPHIL # BLD AUTO: 0.27 10*3/MM3 (ref 0–0.4)
EOSINOPHIL NFR BLD AUTO: 1.6 % (ref 0.3–6.2)
ERYTHROCYTE [DISTWIDTH] IN BLOOD BY AUTOMATED COUNT: 13.5 % (ref 12.3–15.4)
GFR SERPL CREATININE-BSD FRML MDRD: >150 ML/MIN/1.73
GLOBULIN UR ELPH-MCNC: 3 GM/DL
GLUCOSE SERPL-MCNC: 106 MG/DL (ref 65–99)
GRAM STN SPEC: ABNORMAL
GRAM STN SPEC: NORMAL
GRAM STN SPEC: NORMAL
HCT VFR BLD AUTO: 28.3 % (ref 34–46.6)
HGB BLD-MCNC: 9.3 G/DL (ref 12–15.9)
IMM GRANULOCYTES # BLD AUTO: 0.2 10*3/MM3 (ref 0–0.05)
IMM GRANULOCYTES NFR BLD AUTO: 1.2 % (ref 0–0.5)
LYMPHOCYTES # BLD AUTO: 1.28 10*3/MM3 (ref 0.7–3.1)
LYMPHOCYTES NFR BLD AUTO: 7.7 % (ref 19.6–45.3)
MCH RBC QN AUTO: 29 PG (ref 26.6–33)
MCHC RBC AUTO-ENTMCNC: 32.9 G/DL (ref 31.5–35.7)
MCV RBC AUTO: 88.2 FL (ref 79–97)
MONOCYTES # BLD AUTO: 1.17 10*3/MM3 (ref 0.1–0.9)
MONOCYTES NFR BLD AUTO: 7 % (ref 5–12)
NEUTROPHILS NFR BLD AUTO: 13.7 10*3/MM3 (ref 1.7–7)
NEUTROPHILS NFR BLD AUTO: 82.2 % (ref 42.7–76)
NRBC BLD AUTO-RTO: 0 /100 WBC (ref 0–0.2)
PLATELET # BLD AUTO: 829 10*3/MM3 (ref 140–450)
PMV BLD AUTO: 8.3 FL (ref 6–12)
POTASSIUM SERPL-SCNC: 3.8 MMOL/L (ref 3.5–5.2)
PROT SERPL-MCNC: 5.3 G/DL (ref 6–8.5)
RBC # BLD AUTO: 3.21 10*6/MM3 (ref 3.77–5.28)
SODIUM SERPL-SCNC: 134 MMOL/L (ref 136–145)
WBC # BLD AUTO: 16.67 10*3/MM3 (ref 3.4–10.8)

## 2020-09-05 PROCEDURE — 25010000002 ENOXAPARIN PER 10 MG: Performed by: FAMILY MEDICINE

## 2020-09-05 PROCEDURE — 80053 COMPREHEN METABOLIC PANEL: CPT | Performed by: FAMILY MEDICINE

## 2020-09-05 PROCEDURE — 94799 UNLISTED PULMONARY SVC/PX: CPT

## 2020-09-05 PROCEDURE — 71045 X-RAY EXAM CHEST 1 VIEW: CPT

## 2020-09-05 PROCEDURE — 25010000002 VANCOMYCIN 10 G RECONSTITUTED SOLUTION: Performed by: FAMILY MEDICINE

## 2020-09-05 PROCEDURE — 85025 COMPLETE CBC W/AUTO DIFF WBC: CPT | Performed by: FAMILY MEDICINE

## 2020-09-05 PROCEDURE — 25010000002 KETOROLAC TROMETHAMINE PER 15 MG: Performed by: NURSE PRACTITIONER

## 2020-09-05 PROCEDURE — 25010000002 PIPERACILLIN SOD-TAZOBACTAM PER 1 G: Performed by: FAMILY MEDICINE

## 2020-09-05 PROCEDURE — 99024 POSTOP FOLLOW-UP VISIT: CPT | Performed by: SURGERY

## 2020-09-05 PROCEDURE — 25010000002 CEFTRIAXONE PER 250 MG: Performed by: FAMILY MEDICINE

## 2020-09-05 RX ADMIN — SODIUM CHLORIDE, PRESERVATIVE FREE 10 ML: 5 INJECTION INTRAVENOUS at 20:55

## 2020-09-05 RX ADMIN — KETOROLAC TROMETHAMINE 15 MG: 15 INJECTION, SOLUTION INTRAMUSCULAR; INTRAVENOUS at 14:38

## 2020-09-05 RX ADMIN — HYDROCODONE BITARTRATE AND ACETAMINOPHEN 1 TABLET: 5; 325 TABLET ORAL at 13:14

## 2020-09-05 RX ADMIN — VANCOMYCIN HYDROCHLORIDE 1250 MG: 10 INJECTION, POWDER, LYOPHILIZED, FOR SOLUTION INTRAVENOUS at 00:27

## 2020-09-05 RX ADMIN — HYDROCODONE BITARTRATE AND ACETAMINOPHEN 1 TABLET: 5; 325 TABLET ORAL at 06:10

## 2020-09-05 RX ADMIN — IPRATROPIUM BROMIDE 0.5 MG: 0.5 SOLUTION RESPIRATORY (INHALATION) at 14:10

## 2020-09-05 RX ADMIN — AMITRIPTYLINE HYDROCHLORIDE 150 MG: 75 TABLET, FILM COATED ORAL at 20:55

## 2020-09-05 RX ADMIN — METHOCARBAMOL 500 MG: 500 TABLET ORAL at 08:48

## 2020-09-05 RX ADMIN — IPRATROPIUM BROMIDE 0.5 MG: 0.5 SOLUTION RESPIRATORY (INHALATION) at 06:51

## 2020-09-05 RX ADMIN — LEVOTHYROXINE SODIUM 112 MCG: 112 TABLET ORAL at 06:02

## 2020-09-05 RX ADMIN — CEFTRIAXONE SODIUM 1 G: 1 INJECTION, POWDER, FOR SOLUTION INTRAMUSCULAR; INTRAVENOUS at 17:08

## 2020-09-05 RX ADMIN — IPRATROPIUM BROMIDE 0.5 MG: 0.5 SOLUTION RESPIRATORY (INHALATION) at 20:16

## 2020-09-05 RX ADMIN — POLYETHYLENE GLYCOL 3350 17 G: 17 POWDER, FOR SOLUTION ORAL at 08:48

## 2020-09-05 RX ADMIN — METHOCARBAMOL 500 MG: 500 TABLET ORAL at 17:08

## 2020-09-05 RX ADMIN — TAZOBACTAM SODIUM AND PIPERACILLIN SODIUM 3.38 G: 375; 3 INJECTION, SOLUTION INTRAVENOUS at 00:27

## 2020-09-05 RX ADMIN — PREGABALIN 25 MG: 25 CAPSULE ORAL at 08:48

## 2020-09-05 RX ADMIN — LIDOCAINE 2 PATCH: 50 PATCH CUTANEOUS at 08:48

## 2020-09-05 RX ADMIN — KETOROLAC TROMETHAMINE 15 MG: 15 INJECTION, SOLUTION INTRAMUSCULAR; INTRAVENOUS at 08:48

## 2020-09-05 RX ADMIN — ALBUTEROL SULFATE 1.25 MG: 1.25 SOLUTION RESPIRATORY (INHALATION) at 06:50

## 2020-09-05 RX ADMIN — ENOXAPARIN SODIUM 40 MG: 40 INJECTION SUBCUTANEOUS at 06:02

## 2020-09-05 RX ADMIN — ALBUTEROL SULFATE 1.25 MG: 1.25 SOLUTION RESPIRATORY (INHALATION) at 20:16

## 2020-09-05 RX ADMIN — KETOROLAC TROMETHAMINE 15 MG: 15 INJECTION, SOLUTION INTRAMUSCULAR; INTRAVENOUS at 20:56

## 2020-09-05 RX ADMIN — POTASSIUM CHLORIDE 20 MEQ: 750 CAPSULE, EXTENDED RELEASE ORAL at 17:08

## 2020-09-05 RX ADMIN — VANCOMYCIN HYDROCHLORIDE 1250 MG: 10 INJECTION, POWDER, LYOPHILIZED, FOR SOLUTION INTRAVENOUS at 08:56

## 2020-09-05 RX ADMIN — ALBUTEROL SULFATE 1.25 MG: 1.25 SOLUTION RESPIRATORY (INHALATION) at 14:10

## 2020-09-05 RX ADMIN — DOCUSATE SODIUM 100 MG: 100 CAPSULE ORAL at 08:48

## 2020-09-05 RX ADMIN — KETOROLAC TROMETHAMINE 15 MG: 15 INJECTION, SOLUTION INTRAMUSCULAR; INTRAVENOUS at 02:12

## 2020-09-05 RX ADMIN — ESTROGENS, CONJUGATED 0.3 MG: 0.3 TABLET, FILM COATED ORAL at 08:48

## 2020-09-05 RX ADMIN — METOPROLOL TARTRATE 50 MG: 50 TABLET ORAL at 20:55

## 2020-09-05 RX ADMIN — METHOCARBAMOL 500 MG: 500 TABLET ORAL at 20:55

## 2020-09-05 RX ADMIN — TAZOBACTAM SODIUM AND PIPERACILLIN SODIUM 3.38 G: 375; 3 INJECTION, SOLUTION INTRAVENOUS at 08:48

## 2020-09-05 RX ADMIN — FAMOTIDINE 20 MG: 20 TABLET, FILM COATED ORAL at 17:08

## 2020-09-05 RX ADMIN — POTASSIUM CHLORIDE 20 MEQ: 750 CAPSULE, EXTENDED RELEASE ORAL at 08:48

## 2020-09-05 RX ADMIN — PREGABALIN 25 MG: 25 CAPSULE ORAL at 20:55

## 2020-09-05 RX ADMIN — FAMOTIDINE 20 MG: 20 TABLET, FILM COATED ORAL at 08:49

## 2020-09-05 NOTE — PLAN OF CARE
Problem: Patient Care Overview  Goal: Plan of Care Review  Outcome: Ongoing (interventions implemented as appropriate)  Flowsheets (Taken 9/5/2020 6438)  Progress: no change  Plan of Care Reviewed With: patient  Outcome Summary: Patient ambulated in room to bathroom x 2. IV abx infusing. IID between. CT x 2 to -20 cm of dry suction. See I&O. Pain controlled with PRN and scheduled pain medications. See MAR. VSS. Safety maitained. Will continue to monitor.

## 2020-09-05 NOTE — PROGRESS NOTES
"    [facility] Cardiothoracic Surgery  PROGRESS NOTE     Subjective:  Much better today pain is better controlled eating better.  Walked to bathroom but not in the halls yet.    Objective:      BP 99/48 (BP Location: Left arm, Patient Position: Sitting)   Pulse 111   Temp 98 °F (36.7 °C) (Oral)   Resp 18   Ht 170.2 cm (67\")   Wt 73.7 kg (162 lb 6 oz)   LMP 08/31/2012   SpO2 95%   BMI 25.43 kg/m²       Intake/Output Summary (Last 24 hours) at 9/5/2020 1103  Last data filed at 9/5/2020 0825  Gross per 24 hour   Intake --   Output 1960 ml   Net -1960 ml         PE:  Vitals:    09/05/20 0825   BP: 99/48   Pulse: 111   Resp: 18   Temp: 98 °F (36.7 °C)   SpO2:      GENERAL: NAD, resting comfortably, normal palor  CARDIOVASCULAR: regular, regular rate, sinus  PULMONARY: Normal bilateral breath sounds, no labored breathing, chest tubes without airleak and with straw colored drainage  ABDOMEN: soft, nt/nd  EXTREMITIES: mild peripheral edema, normal pulses, normal ROM, edema improved        Lab Results (last 72 hours)     Procedure Component Value Units Date/Time    Anaerobic Culture - Body Fluid, Bronchus [[id number]] Collected:  09/02/20 1539    Specimen:  Body Fluid from Bronchus Updated:  09/05/20 0902     Anaerobic Culture No anaerobes isolated at 3 days    Anaerobic Culture - Body Fluid, Pleural Cavity [[id number]] Collected:  09/02/20 1608    Specimen:  Body Fluid from Pleural Cavity Updated:  09/05/20 0902     Anaerobic Culture No anaerobes isolated at 3 days    Blood Culture With KENISHA - Blood, Arm, Right [[id number]] Collected:  08/31/20 0638    Specimen:  Blood from Arm, Right Updated:  09/05/20 0900     Blood Culture No growth at 5 days    Blood Culture With KENISHA - Blood, Hand, Right [[id number]] Collected:  08/31/20 0614    Specimen:  Blood from Hand, Right Updated:  09/05/20 0900     Blood Culture No growth at 5 days    Anaerobic Culture - Body Fluid, Bronchus [[id number]] Collected:  09/02/20 " 1542    Specimen:  Body Fluid from Bronchus Updated:  09/05/20 0852     Anaerobic Culture No anaerobes isolated at 3 days    Anaerobic Culture - Tissue, Pleural Cavity [[id number]] Collected:  09/02/20 1614    Specimen:  Tissue from Pleural Cavity Updated:  09/05/20 0852     Anaerobic Culture No anaerobes isolated at 3 days    Body Fluid Culture - Body Fluid, Bronchus [[id number]] Collected:  09/02/20 1542    Specimen:  Body Fluid from Bronchus Updated:  09/05/20 0615     Body Fluid Culture No growth at 3 days     Gram Stain Moderate (3+) WBCs seen      No organisms seen    Body Fluid Culture - Body Fluid, Bronchus [[id number]]  (Abnormal) Collected:  09/02/20 1539    Specimen:  Body Fluid from Bronchus Updated:  09/05/20 0608     Body Fluid Culture Rare Streptococcus intermedius     Gram Stain Moderate (3+) WBCs seen      Few (2+) Gram negative bacilli    Narrative:       Refer to previous body fluid culture collected on 9-1-20 for MICs    Tissue / Bone Culture - Tissue, Pleural Cavity [[id number]]  (Abnormal) Collected:  09/02/20 1614    Specimen:  Tissue from Pleural Cavity Updated:  09/05/20 0608     Tissue Culture Scant growth (1+) Streptococcus intermedius     Gram Stain Few (2+) WBCs seen      No organisms seen    Narrative:       Refer to previous body fluid culture collected on 9-1-20 for MICs    Body Fluid Culture - Body Fluid, Pleural Cavity [[id number]]  (Abnormal) Collected:  09/02/20 1608    Specimen:  Body Fluid from Pleural Cavity Updated:  09/05/20 0607     Body Fluid Culture Light growth (2+) Streptococcus intermedius     Gram Stain Moderate (3+) WBCs seen      Few (2+) Gram positive cocci    Narrative:       Refer to previous body fluid culture collected on 9-1-20 for MICs    Comprehensive Metabolic Panel [[id number]]  (Abnormal) Collected:  09/05/20 0519    Specimen:  Blood Updated:  09/05/20 0600     Glucose 106 mg/dL      BUN 8 mg/dL      Creatinine 0.32 mg/dL      Sodium 134 mmol/L      Potassium  3.8 mmol/L      Chloride 95 mmol/L      CO2 27.0 mmol/L      Calcium 7.8 mg/dL      Total Protein 5.3 g/dL      Albumin 2.30 g/dL      ALT (SGPT) 38 U/L      AST (SGOT) 32 U/L      Alkaline Phosphatase 324 U/L      Total Bilirubin 0.4 mg/dL      eGFR Non African Amer >150 mL/min/1.73      Globulin 3.0 gm/dL      A/G Ratio 0.8 g/dL      BUN/Creatinine Ratio 25.0     Anion Gap 12.0 mmol/L     Narrative:       GFR Normal >60  Chronic Kidney Disease <60  Kidney Failure <15      CBC & Differential [[id number]] Collected:  09/05/20 0519    Specimen:  Blood Updated:  09/05/20 0554    Narrative:       The following orders were created for panel order CBC & Differential.  Procedure                               Abnormality         Status                     ---------                               -----------         ------                     CBC Auto Differential[[id number]]        Abnormal            Final result                 Please view results for these tests on the individual orders.    CBC Auto Differential [[id number]]  (Abnormal) Collected:  09/05/20 0519    Specimen:  Blood Updated:  09/05/20 0554     WBC 16.67 10*3/mm3      RBC 3.21 10*6/mm3      Hemoglobin 9.3 g/dL      Hematocrit 28.3 %      MCV 88.2 fL      MCH 29.0 pg      MCHC 32.9 g/dL      RDW 13.5 %      RDW-SD 43.8 fl      MPV 8.3 fL      Platelets 829 10*3/mm3      Neutrophil % 82.2 %      Lymphocyte % 7.7 %      Monocyte % 7.0 %      Eosinophil % 1.6 %      Basophil % 0.3 %      Immature Grans % 1.2 %      Neutrophils, Absolute 13.70 10*3/mm3      Lymphocytes, Absolute 1.28 10*3/mm3      Monocytes, Absolute 1.17 10*3/mm3      Eosinophils, Absolute 0.27 10*3/mm3      Basophils, Absolute 0.05 10*3/mm3      Immature Grans, Absolute 0.20 10*3/mm3      nRBC 0.0 /100 WBC     Magnesium [[id number]]  (Normal) Collected:  09/04/20 0657    Specimen:  Blood Updated:  09/04/20 0918     Magnesium 2.1 mg/dL     Respiratory Culture - Sputum, Cough [[id number]] Collected:   09/01/20 1920    Specimen:  Sputum from Cough Updated:  09/04/20 0741     Respiratory Culture Scant growth (1+) Normal Respiratory Simran: NO S.aureus/MRSA or Pseudomonas aeruginosa     Gram Stain Greater than 25 WBCs per low power field      Less than 25 Epithelial cells per low power field      Rare (1+) Mixed gram positive simran    Vancomycin, Trough [[id number]]  (Normal) Collected:  09/04/20 0657    Specimen:  Blood Updated:  09/04/20 0730     Vancomycin Trough 7.00 mcg/mL     Comprehensive Metabolic Panel [[id number]]  (Abnormal) Collected:  09/04/20 0657    Specimen:  Blood Updated:  09/04/20 0728     Glucose 117 mg/dL      BUN 11 mg/dL      Creatinine 0.29 mg/dL      Sodium 135 mmol/L      Potassium 3.4 mmol/L      Chloride 94 mmol/L      CO2 31.0 mmol/L      Calcium 7.9 mg/dL      Total Protein 5.3 g/dL      Albumin 2.30 g/dL      ALT (SGPT) 43 U/L      AST (SGOT) 38 U/L      Alkaline Phosphatase 359 U/L      Total Bilirubin 0.4 mg/dL      eGFR Non African Amer >150 mL/min/1.73      Globulin 3.0 gm/dL      A/G Ratio 0.8 g/dL      BUN/Creatinine Ratio 37.9     Anion Gap 10.0 mmol/L     Narrative:       GFR Normal >60  Chronic Kidney Disease <60  Kidney Failure <15      CBC & Differential [[id number]] Collected:  09/04/20 0657    Specimen:  Blood Updated:  09/04/20 0706    Narrative:       The following orders were created for panel order CBC & Differential.  Procedure                               Abnormality         Status                     ---------                               -----------         ------                     CBC Auto Differential[[id number]]        Abnormal            Final result                 Please view results for these tests on the individual orders.    CBC Auto Differential [[id number]]  (Abnormal) Collected:  09/04/20 0657    Specimen:  Blood Updated:  09/04/20 0706     WBC 17.83 10*3/mm3      RBC 3.27 10*6/mm3      Hemoglobin 9.8 g/dL      Hematocrit 28.6 %      MCV 87.5 fL      MCH  30.0 pg      MCHC 34.3 g/dL      RDW 13.6 %      RDW-SD 44.1 fl      MPV 8.1 fL      Platelets 765 10*3/mm3      Neutrophil % 83.9 %      Lymphocyte % 7.3 %      Monocyte % 7.6 %      Eosinophil % 0.2 %      Basophil % 0.2 %      Immature Grans % 0.8 %      Neutrophils, Absolute 14.98 10*3/mm3      Lymphocytes, Absolute 1.30 10*3/mm3      Monocytes, Absolute 1.35 10*3/mm3      Eosinophils, Absolute 0.03 10*3/mm3      Basophils, Absolute 0.03 10*3/mm3      Immature Grans, Absolute 0.14 10*3/mm3      nRBC 0.0 /100 WBC     Body Fluid Culture - Body Fluid, Pleural Cavity [[id number]]  (Abnormal)  (Susceptibility) Collected:  09/01/20 1346    Specimen:  Body Fluid from Pleural Cavity Updated:  09/04/20 0626     Body Fluid Culture Light growth (2+) Streptococcus intermedius     Gram Stain Many (4+) WBCs seen      No organisms seen    Susceptibility      Streptococcus intermedius     LEXX     Ceftriaxone Susceptible     Penicillin G Susceptible     Vancomycin Susceptible                    Lipase, Body Fluid - Pleural Fluid, Pleural Cavity [[id number]] Collected:  09/01/20 1347    Specimen:  Pleural Fluid from Pleural Cavity Updated:  09/03/20 1410     Lipase, Fluid 249 U/L      Comment: INTERPRETIVE INFORMATION: Lipase, Fluid  For information on body fluid reference ranges and/or interpretive  guidance visit [email or web address]  Test developed and characteristics determined by [facility]. See Compliance Statement B: [email or web address]       Narrative:       Performed at:  01 - [facility]  [address] [place] [address]  [id number]  : Arsen Tavarez MD, Phone:  [phone]    Non-gynecologic Cytology [[id number]] Collected:  09/01/20 1346    Specimen:  Body Fluid from Pleural Cavity Updated:  09/03/20 1213     Case Report --     Non-gynecologic Cytology                          Case: [id number]                                  Authorizing Provider:  Odalis Marie APRN     Collected:            2020 01:46 PM          Ordering Location:     [facility]     Received:            2020 08:04 AM                                 CARDIAC CARE                                                                 Pathologist:           Butch Hobson MD                                                   Specimen:    Pleural Cavity, pleural fluid                                                               Final Diagnosis --     Right pleural cavity, thoracentesis:     Negative for malignancy.     Acute inflammation.       Gross Description --     Received fresh in the laboratory in a container labeled with patient name and  designated as pleural fluid.  8 mls of blood tinged fluid.  1 thin prep slide and 1 cell block prepared.         Microscopic Description --     Review of the right pleural fluid reveals base with numerous segmented neutrophils.  There are benign mesothelial cells.  There is no evidence of malignancy.      Comprehensive Metabolic Panel [[id number]]  (Abnormal) Collected:  20    Specimen:  Blood Updated:  20     Glucose 153 mg/dL      BUN 7 mg/dL      Creatinine 0.23 mg/dL      Sodium 138 mmol/L      Potassium 3.6 mmol/L      Chloride 98 mmol/L      CO2 26.0 mmol/L      Calcium 7.7 mg/dL      Total Protein 5.2 g/dL      Albumin 2.30 g/dL      ALT (SGPT) 43 U/L      AST (SGOT) 40 U/L      Alkaline Phosphatase 456 U/L      Total Bilirubin 0.5 mg/dL      eGFR Non African Amer >150 mL/min/1.73      Globulin 2.9 gm/dL      A/G Ratio 0.8 g/dL      BUN/Creatinine Ratio 30.4     Anion Gap 14.0 mmol/L     Narrative:       GFR Normal >60  Chronic Kidney Disease <60  Kidney Failure <15      CBC & Differential [[id number]] Collected:  20    Specimen:  Blood Updated:  20    Narrative:       The following orders were created for panel order CBC & Differential.  Procedure                               Abnormality         Status                      ---------                               -----------         ------                     CBC Auto Differential[[id number]]        Abnormal            Final result                 Please view results for these tests on the individual orders.    CBC Auto Differential [[id number]]  (Abnormal) Collected:  09/03/20 0301    Specimen:  Blood Updated:  09/03/20 0347     WBC 19.50 10*3/mm3      RBC 3.06 10*6/mm3      Hemoglobin 9.1 g/dL      Hematocrit 27.0 %      MCV 88.2 fL      MCH 29.7 pg      MCHC 33.7 g/dL      RDW 13.8 %      RDW-SD 44.5 fl      MPV 8.3 fL      Platelets 642 10*3/mm3      Neutrophil % 93.4 %      Lymphocyte % 2.6 %      Monocyte % 2.8 %      Eosinophil % 0.0 %      Basophil % 0.2 %      Immature Grans % 1.0 %      Neutrophils, Absolute 18.20 10*3/mm3      Lymphocytes, Absolute 0.51 10*3/mm3      Monocytes, Absolute 0.55 10*3/mm3      Eosinophils, Absolute 0.00 10*3/mm3      Basophils, Absolute 0.04 10*3/mm3      Immature Grans, Absolute 0.20 10*3/mm3      nRBC 0.0 /100 WBC     Glucose, Body Fluid - Pleural Fluid, Pleural Cavity [[id number]] Collected:  09/01/20 1347    Specimen:  Pleural Fluid from Pleural Cavity Updated:  09/02/20 2048     Glucose, Fluid <54 mg/dL     Narrative:       No Reference Ranges Established.    Serous fluid glucose less than 60 mg/dL or less than 30 mg/dL below serum glucose suggests an infectious or malignant exudate.     This test was developed, it performance characteristics determined and judged suitable for clinical purposes by [facility].  It has not been cleared or approved by the FDA.  The laboratory is regulated under CLIA as qualified to perform high-complexity testing.     Basic Metabolic Panel [[id number]]  (Abnormal) Collected:  09/02/20 1947    Specimen:  Blood Updated:  09/02/20 2030     Glucose 150 mg/dL      BUN 6 mg/dL      Creatinine 0.39 mg/dL      Sodium 136 mmol/L      Potassium 3.6 mmol/L      Chloride 95 mmol/L      CO2  24.0 mmol/L      Calcium 7.8 mg/dL      eGFR Non African Amer >150 mL/min/1.73      BUN/Creatinine Ratio 15.4     Anion Gap 17.0 mmol/L     Narrative:       GFR Normal >60  Chronic Kidney Disease <60  Kidney Failure <15      Amylase, Body Fluid - Pleural Fluid, Pleural Cavity [[id number]] Collected:  09/01/20 1347    Specimen:  Pleural Fluid from Pleural Cavity Updated:  09/02/20 2011     Amylase, Fluid 224 U/L     Narrative:       No Reference Ranges Established.    This test was developed, it performance characteristics determined and judged suitable for clinical purposes by [facility].  It has not been cleared or approved by the FDA.  The laboratory is regulated under CLIA as qualified to perform high-complexity testing.     CBC (No Diff) [[id number]]  (Abnormal) Collected:  09/02/20 1947    Specimen:  Blood Updated:  09/02/20 1953     WBC 21.80 10*3/mm3      RBC 3.31 10*6/mm3      Hemoglobin 9.8 g/dL      Hematocrit 29.2 %      MCV 88.2 fL      MCH 29.6 pg      MCHC 33.6 g/dL      RDW 13.9 %      RDW-SD 45.2 fl      MPV 7.9 fL      Platelets 621 10*3/mm3     Fungus Culture - Tissue, Pleural Cavity [[id number]] Collected:  09/02/20 1614    Specimen:  Tissue from Pleural Cavity Updated:  09/02/20 1904    Fungus Culture - Body Fluid, Pleural Cavity [[id number]] Collected:  09/02/20 1608    Specimen:  Body Fluid from Pleural Cavity Updated:  09/02/20 1638    Fungus Culture - Body Fluid, Bronchus [[id number]] Collected:  09/02/20 1542    Specimen:  Body Fluid from Bronchus Updated:  09/02/20 1636    Fungus Culture - Body Fluid, Bronchus [[id number]] Collected:  09/02/20 1539    Specimen:  Body Fluid from Bronchus Updated:  09/02/20 1636    MRSA Screen, PCR (Inpatient) - Swab, Nares [[id number]]  (Normal) Collected:  09/02/20 1029    Specimen:  Swab from Nares Updated:  09/02/20 1218     MRSA PCR No MRSA Detected              CXR: Chest x-ray improved with better aeriation of right lower lobe small  right pleural effusion remains but is improving    Assessment/Plan     Impression: 49-year-old female with complicated right lower lobe loculated pleural effusion.  This is in the setting of pneumonia.  Improving postop day 3 from VATS right pleurodesis and decortication.  Chest tube output is decreasing but I would like to keep the chest tubes 1 more day to get all fluid out.  Her cultures have shown Streptococcus species in both the pleural fluid and pleural peel.    Plan:  Continue gentle diuresis today with close attention to hypotension, was net -2 L yesterday.  Continue chest tubes 1 more day to waterseal today  Out of bed and walking is much as possible into the halls multiple times today  After speciation okay to change antibiotics to species directed therapy, would favor 2-week course of oral antibiotics given pleural cavity infection  Pain control much better now on current regimen    Tyler Coburn M.D.  Cardiothoracic Surgeon

## 2020-09-05 NOTE — PLAN OF CARE
Pt awake and alert. Pt states pain controlled is much improved today after adjustments of medications. Anterior/posterior chest tube to gravity today per DR. Coburn's order. Small amt of serosanguinous drainage from chest tube.CT dressing dry and intact. Up in chair today, ambulating hallway with standby assist with encouragement. Continue IV antibiotics. Continue to monitor.

## 2020-09-05 NOTE — PROGRESS NOTES
[facility]  INPATIENT PROGRESS NOTE    Length of Stay: 6  Date of Admission: 8/30/2020  Primary Care Physician: Raegan Dubose DO    Subjective   Chief Complaint: Pneumonia/loculated pleural effusion/shortness of breath/pancreatitis/leukocytosis/fever.    HPI   T-max nine 9.3.  T-current 98.6.  Patient is feeling a lot better.  Chest pain from chest tube.    Review of Systems   Constitutional: Positive for activity change, appetite change and fatigue. Negative for chills and fever.   HENT: Negative for hearing loss, nosebleeds, tinnitus and trouble swallowing.    Eyes: Negative for visual disturbance.   Respiratory: Patient is off oxygen.  Chest pain from chest tube.  Coughing has improved.  Cardiovascular: Chest pain from chest tube.  No palpitations and leg swelling.   Gastrointestinal: Negative for abdominal distention, abdominal pain, blood in stool, constipation, diarrhea, nausea and vomiting.   Endocrine: Negative for cold intolerance, heat intolerance, polydipsia, polyphagia and polyuria.   Genitourinary: Negative for decreased urine volume, difficulty urinating, dysuria, flank pain, frequency and hematuria.   Musculoskeletal: Positive for arthralgias, gait problem and myalgias. Negative for joint swelling.   Skin: Negative for rash.   Allergic/Immunologic: Negative for immunocompromised state.   Neurological: Positive for weakness. Negative for dizziness, syncope, light-headedness and headaches.   Hematological: Negative for adenopathy. Does not bruise/bleed easily.   Psychiatric/Behavioral: Negative for confusion and sleep disturbance. The patient is not nervous/anxious.     All pertinent negatives and positives are as above. All other systems have been reviewed and are negative unless otherwise stated.     Objective    Temp:  [98 °F (36.7 °C)-99.3 °F (37.4 °C)] 98.6 °F (37 °C)  Heart Rate:  [] 102  Resp:  [18] 18  BP: ()/(38-59)  114/38    Intake/Output Summary (Last 24 hours) at 9/5/2020 1553  Last data filed at 9/5/2020 1446  Gross per 24 hour   Intake --   Output 2870 ml   Net -2870 ml     Physical Exam  Constitutional: She is oriented to person, place, and time. She appears well-developed.   HENT:   Head: Normocephalic.   Eyes: Pupils are equal, round, and reactive to light. Conjunctivae are normal.   Neck: Neck supple. No JVD present.   Cardiovascular: Normal heart sounds and intact distal pulses. Exam reveals no gallop and no friction rub.   No murmur heard.  Pulmonary/Chest: No respiratory distress. She has no rales. She exhibits no tenderness.  Decrease in breath sound right lung.   2 Chest tubes in place right chest/drain to gravity.  Abdominal: Soft. Bowel sounds are normal. She exhibits no distension.  Nontender abdomen.  There is no rebound and no guarding.   Musculoskeletal: Normal range of motion. She exhibits no edema, tenderness or deformity.   Neurological: She is alert and oriented to person, place, and time. She displays normal reflexes. No cranial nerve deficit. She exhibits abnormal muscle tone. Coordination abnormal.   Skin: Skin is warm and dry. Capillary refill takes 2 to 3 seconds. No rash noted.   Psychiatric: She has a normal mood and affect. Her behavior is normal. Judgment and thought content normal.   Nursing note and vitals reviewed.  Results Review:  Lab Results (last 24 hours)     Procedure Component Value Units Date/Time    Anaerobic Culture - Body Fluid, Bronchus [[id number]] Collected:  09/02/20 1539    Specimen:  Body Fluid from Bronchus Updated:  09/05/20 0902     Anaerobic Culture No anaerobes isolated at 3 days    Anaerobic Culture - Body Fluid, Pleural Cavity [[id number]] Collected:  09/02/20 1608    Specimen:  Body Fluid from Pleural Cavity Updated:  09/05/20 0902     Anaerobic Culture No anaerobes isolated at 3 days    Blood Culture With KENISHA - Blood, Arm, Right [[id number]] Collected:  08/31/20 0638     Specimen:  Blood from Arm, Right Updated:  09/05/20 0900     Blood Culture No growth at 5 days    Blood Culture With KENISHA - Blood, Hand, Right [[id number]] Collected:  08/31/20 0614    Specimen:  Blood from Hand, Right Updated:  09/05/20 0900     Blood Culture No growth at 5 days    Anaerobic Culture - Body Fluid, Bronchus [[id number]] Collected:  09/02/20 1542    Specimen:  Body Fluid from Bronchus Updated:  09/05/20 0852     Anaerobic Culture No anaerobes isolated at 3 days    Anaerobic Culture - Tissue, Pleural Cavity [[id number]] Collected:  09/02/20 1614    Specimen:  Tissue from Pleural Cavity Updated:  09/05/20 0852     Anaerobic Culture No anaerobes isolated at 3 days    Body Fluid Culture - Body Fluid, Bronchus [[id number]] Collected:  09/02/20 1542    Specimen:  Body Fluid from Bronchus Updated:  09/05/20 0615     Body Fluid Culture No growth at 3 days     Gram Stain Moderate (3+) WBCs seen      No organisms seen    Body Fluid Culture - Body Fluid, Bronchus [[id number]]  (Abnormal) Collected:  09/02/20 1539    Specimen:  Body Fluid from Bronchus Updated:  09/05/20 0608     Body Fluid Culture Rare Streptococcus intermedius     Gram Stain Moderate (3+) WBCs seen      Few (2+) Gram negative bacilli    Narrative:       Refer to previous body fluid culture collected on 9-1-20 for MICs    Tissue / Bone Culture - Tissue, Pleural Cavity [[id number]]  (Abnormal) Collected:  09/02/20 1614    Specimen:  Tissue from Pleural Cavity Updated:  09/05/20 0608     Tissue Culture Scant growth (1+) Streptococcus intermedius     Gram Stain Few (2+) WBCs seen      No organisms seen    Narrative:       Refer to previous body fluid culture collected on 9-1-20 for MICs    Body Fluid Culture - Body Fluid, Pleural Cavity [[id number]]  (Abnormal) Collected:  09/02/20 1608    Specimen:  Body Fluid from Pleural Cavity Updated:  09/05/20 0607     Body Fluid Culture Light growth (2+) Streptococcus intermedius     Gram Stain Moderate (3+) WBCs  seen      Few (2+) Gram positive cocci    Narrative:       Refer to previous body fluid culture collected on 9-1-20 for MICs    Comprehensive Metabolic Panel [[id number]]  (Abnormal) Collected:  09/05/20 0519    Specimen:  Blood Updated:  09/05/20 0600     Glucose 106 mg/dL      BUN 8 mg/dL      Creatinine 0.32 mg/dL      Sodium 134 mmol/L      Potassium 3.8 mmol/L      Chloride 95 mmol/L      CO2 27.0 mmol/L      Calcium 7.8 mg/dL      Total Protein 5.3 g/dL      Albumin 2.30 g/dL      ALT (SGPT) 38 U/L      AST (SGOT) 32 U/L      Alkaline Phosphatase 324 U/L      Total Bilirubin 0.4 mg/dL      eGFR Non African Amer >150 mL/min/1.73      Globulin 3.0 gm/dL      A/G Ratio 0.8 g/dL      BUN/Creatinine Ratio 25.0     Anion Gap 12.0 mmol/L     Narrative:       GFR Normal >60  Chronic Kidney Disease <60  Kidney Failure <15      CBC & Differential [[id number]] Collected:  09/05/20 0519    Specimen:  Blood Updated:  09/05/20 0554    Narrative:       The following orders were created for panel order CBC & Differential.  Procedure                               Abnormality         Status                     ---------                               -----------         ------                     CBC Auto Differential[[id number]]        Abnormal            Final result                 Please view results for these tests on the individual orders.    CBC Auto Differential [[id number]]  (Abnormal) Collected:  09/05/20 0519    Specimen:  Blood Updated:  09/05/20 0554     WBC 16.67 10*3/mm3      RBC 3.21 10*6/mm3      Hemoglobin 9.3 g/dL      Hematocrit 28.3 %      MCV 88.2 fL      MCH 29.0 pg      MCHC 32.9 g/dL      RDW 13.5 %      RDW-SD 43.8 fl      MPV 8.3 fL      Platelets 829 10*3/mm3      Neutrophil % 82.2 %      Lymphocyte % 7.7 %      Monocyte % 7.0 %      Eosinophil % 1.6 %      Basophil % 0.3 %      Immature Grans % 1.2 %      Neutrophils, Absolute 13.70 10*3/mm3      Lymphocytes, Absolute 1.28 10*3/mm3      Monocytes,  Absolute 1.17 10*3/mm3      Eosinophils, Absolute 0.27 10*3/mm3      Basophils, Absolute 0.05 10*3/mm3      Immature Grans, Absolute 0.20 10*3/mm3      nRBC 0.0 /100 WBC            Cultures:  Blood Culture   Date Value Ref Range Status   08/31/2020 No growth at 5 days  Final   08/31/2020 No growth at 5 days  Final     Respiratory Culture   Date Value Ref Range Status   09/01/2020   Final    Scant growth (1+) Normal Respiratory Sharla: NO S.aureus/MRSA or Pseudomonas aeruginosa       Radiology Data:    Imaging Results (Last 24 Hours)     Procedure Component Value Units Date/Time    XR Chest 1 View [[id number]] Collected:  09/05/20 0917     Updated:  09/05/20 0921    Narrative:       EXAMINATION: XR CHEST 1 VW-     9/5/2020 3:40 AM CDT     HISTORY: Empyema on right, s/p right VATS washout     1 view chest x-ray compared with 9/4/2020.     Stable heart and mediastinum.     2 right chest tubes remain in place.     There is persistent basilar infiltrate with no new or increased lung  disease.  No pneumothorax or heart failure.     Summary:  1. Stable appearance of the chest.  This report was finalized on 09/05/2020 09:18 by Dr. Boubacar Prieto MD.          No Known Allergies    Scheduled meds:     albuterol 1.25 mg Nebulization 4x Daily - RT   amitriptyline 150 mg Oral Nightly   docusate sodium 100 mg Oral Daily   enoxaparin 40 mg Subcutaneous Q24H   estrogens (conjugated) 0.3 mg Oral Daily   famotidine 20 mg Oral BID AC   ipratropium 0.5 mg Nebulization 4x Daily - RT   ketorolac 15 mg Intravenous Q6H   levothyroxine 112 mcg Oral Q AM   lidocaine 2 patch Transdermal Q24H   methocarbamol 500 mg Oral TID   metoprolol tartrate 50 mg Oral Q12H   piperacillin-tazobactam 3.375 g Intravenous Q8H   polyethylene glycol 17 g Oral Daily   potassium chloride 20 mEq Oral BID With Meals   pregabalin 25 mg Oral Q12H   sodium chloride 10 mL Intravenous Q12H   vancomycin 1,250 mg Intravenous Q8H       PRN meds:  •  acetaminophen **OR**  acetaminophen  •  benzonatate  •  cyclobenzaprine  •  HYDROcodone-acetaminophen  •  Morphine **AND** naloxone  •  ondansetron  •  ondansetron **OR** ondansetron  •  Pharmacy to Dose enoxaparin (LOVENOX)  •  sodium chloride    Assessment/Plan       Acute pancreatitis    Pneumonitis    Pleural effusion on right    Elevated liver enzymes    Empyema, right (CMS/HCC)    Leukocytosis      Plan:  Right loculated pleural effusion-empyema/pneumonia/right pneumonitis/coughing.  DC Zosyn and vancomycin.  Start Rocephin antibiotics.  This can be changed to Omnicef upon discharge total 2 weeks worth of antibiotic.  Tessalon Perle PRN.  Cont CT surgeon.  Atrovent nebs.  Incentive spirometer.  Lasix 40 mg x 1.  CT scan of the chest-Moderate lobulated/loculated right pleural effusion with loculated fluid in the right major fissure, scattered bilateral patchy pulmonary opacities representing multifocal pneumonia, trapped portions of the right lower lobe with compressive atelectasis of right middle and lower lobes, reactive mediastinal lymph nodes.  Status post thoracentesis 9/1/2020.   Status post VATS 9/2/2020.  2 Chest tubes in place.  Leukocytosis- improving.       Acute pancreatitis.  Lipase resolved.  GI sign off.   CT scan abdomen pelvic 8/30/2020 at [facility]- mild acute pancreatitis-no suggestion of pancreatic necrosis or pseudocyst formation, cystocele, status post hysterectomy and bilateral salpingo-oophorectomy     Elevated liver enzyme/gallbladder sludge.  Liver enzyme  improving.  Acute hepatitis panel- neg.   Ultrasound of the liver-Sludge is present in the gallbladder, liver is unremarkable, hepatopedal flow is present in the portal vein.     Hypertension/tachycardia.  Tachycardia resolved.  Cont Lopressor.    Echocardiogram-ejection fraction 60-65%, mild mitral valve regurgitation.      Hypokalemia. Po potassium.     Anemia.  Hemoglobin stable.  No sign of acute bleed.     Depression.  Elavil at night.       Postmenopausal.  Premarin.      Hypothyroidism.  Continue Synthroid.     Pain control.  Morphine as needed.  Flexeril as needed. Percocet PRN.     Prophylaxis Lovenox.     Nausea.  Zofran PRN.  Pepcid.     Nutrition.  Advance diet as tolerated.     Blood cultures-no growth 5 days.  Body fluid culture- light growth of Streptococcus intermedius.  Anaerobic culture-no growth in 3 days.  Fungal culture pending.  MRSA DNA probe-negative.  COVID-19-negative.  Respiratory PCR-negative.  Respiratory culture mixed gram-positive simran.  Cytology- right pleural cavity thoracentesis - negative for malignancy, acute inflammation..    Tissue culture-no growth.  Fungal culture pending.     Discharge Plannin-5 days.  Transfer from [facility].     Electronically signed by Randolph Liang MD, 20, 3:53 PM.

## 2020-09-06 ENCOUNTER — APPOINTMENT (OUTPATIENT)
Dept: GENERAL RADIOLOGY | Facility: HOSPITAL | Age: 50
End: 2020-09-06

## 2020-09-06 LAB
ALBUMIN SERPL-MCNC: 2.3 G/DL (ref 3.5–5.2)
ALBUMIN/GLOB SERPL: 0.7 G/DL
ALP SERPL-CCNC: 325 U/L (ref 39–117)
ALT SERPL W P-5'-P-CCNC: 50 U/L (ref 1–33)
ANION GAP SERPL CALCULATED.3IONS-SCNC: 11 MMOL/L (ref 5–15)
AST SERPL-CCNC: 45 U/L (ref 1–32)
BASOPHILS # BLD AUTO: 0.06 10*3/MM3 (ref 0–0.2)
BASOPHILS NFR BLD AUTO: 0.4 % (ref 0–1.5)
BH BB BLOOD EXPIRATION DATE: NORMAL
BH BB BLOOD EXPIRATION DATE: NORMAL
BH BB BLOOD TYPE BARCODE: 6200
BH BB BLOOD TYPE BARCODE: 6200
BH BB DISPENSE STATUS: NORMAL
BH BB DISPENSE STATUS: NORMAL
BH BB PRODUCT CODE: NORMAL
BH BB PRODUCT CODE: NORMAL
BH BB UNIT NUMBER: NORMAL
BH BB UNIT NUMBER: NORMAL
BILIRUB SERPL-MCNC: 0.2 MG/DL (ref 0–1.2)
BUN SERPL-MCNC: 8 MG/DL (ref 6–20)
BUN/CREAT SERPL: 22.9 (ref 7–25)
CALCIUM SPEC-SCNC: 8 MG/DL (ref 8.6–10.5)
CHLORIDE SERPL-SCNC: 98 MMOL/L (ref 98–107)
CO2 SERPL-SCNC: 25 MMOL/L (ref 22–29)
CREAT SERPL-MCNC: 0.35 MG/DL (ref 0.57–1)
CROSSMATCH INTERPRETATION: NORMAL
CROSSMATCH INTERPRETATION: NORMAL
DEPRECATED RDW RBC AUTO: 45.1 FL (ref 37–54)
EOSINOPHIL # BLD AUTO: 0.42 10*3/MM3 (ref 0–0.4)
EOSINOPHIL NFR BLD AUTO: 2.6 % (ref 0.3–6.2)
ERYTHROCYTE [DISTWIDTH] IN BLOOD BY AUTOMATED COUNT: 13.8 % (ref 12.3–15.4)
GFR SERPL CREATININE-BSD FRML MDRD: >150 ML/MIN/1.73
GLOBULIN UR ELPH-MCNC: 3.4 GM/DL
GLUCOSE SERPL-MCNC: 122 MG/DL (ref 65–99)
HCT VFR BLD AUTO: 27.1 % (ref 34–46.6)
HGB BLD-MCNC: 9.1 G/DL (ref 12–15.9)
IMM GRANULOCYTES # BLD AUTO: 0.34 10*3/MM3 (ref 0–0.05)
IMM GRANULOCYTES NFR BLD AUTO: 2.1 % (ref 0–0.5)
LYMPHOCYTES # BLD AUTO: 1.17 10*3/MM3 (ref 0.7–3.1)
LYMPHOCYTES NFR BLD AUTO: 7.2 % (ref 19.6–45.3)
MCH RBC QN AUTO: 29.8 PG (ref 26.6–33)
MCHC RBC AUTO-ENTMCNC: 33.6 G/DL (ref 31.5–35.7)
MCV RBC AUTO: 88.9 FL (ref 79–97)
MONOCYTES # BLD AUTO: 1.26 10*3/MM3 (ref 0.1–0.9)
MONOCYTES NFR BLD AUTO: 7.7 % (ref 5–12)
NEUTROPHILS NFR BLD AUTO: 13.07 10*3/MM3 (ref 1.7–7)
NEUTROPHILS NFR BLD AUTO: 80 % (ref 42.7–76)
NRBC BLD AUTO-RTO: 0 /100 WBC (ref 0–0.2)
PLATELET # BLD AUTO: 859 10*3/MM3 (ref 140–450)
PMV BLD AUTO: 8.5 FL (ref 6–12)
POTASSIUM SERPL-SCNC: 4.2 MMOL/L (ref 3.5–5.2)
PROT SERPL-MCNC: 5.7 G/DL (ref 6–8.5)
RBC # BLD AUTO: 3.05 10*6/MM3 (ref 3.77–5.28)
SODIUM SERPL-SCNC: 134 MMOL/L (ref 136–145)
UNIT  ABO: NORMAL
UNIT  ABO: NORMAL
UNIT  RH: NORMAL
UNIT  RH: NORMAL
WBC # BLD AUTO: 16.32 10*3/MM3 (ref 3.4–10.8)

## 2020-09-06 PROCEDURE — 85025 COMPLETE CBC W/AUTO DIFF WBC: CPT | Performed by: FAMILY MEDICINE

## 2020-09-06 PROCEDURE — 25010000002 CEFTRIAXONE PER 250 MG: Performed by: FAMILY MEDICINE

## 2020-09-06 PROCEDURE — 25010000002 KETOROLAC TROMETHAMINE PER 15 MG: Performed by: NURSE PRACTITIONER

## 2020-09-06 PROCEDURE — 80053 COMPREHEN METABOLIC PANEL: CPT | Performed by: FAMILY MEDICINE

## 2020-09-06 PROCEDURE — 71045 X-RAY EXAM CHEST 1 VIEW: CPT

## 2020-09-06 PROCEDURE — 99024 POSTOP FOLLOW-UP VISIT: CPT | Performed by: SURGERY

## 2020-09-06 PROCEDURE — 94799 UNLISTED PULMONARY SVC/PX: CPT

## 2020-09-06 PROCEDURE — 25010000002 ENOXAPARIN PER 10 MG: Performed by: FAMILY MEDICINE

## 2020-09-06 RX ADMIN — ALBUTEROL SULFATE 1.25 MG: 1.25 SOLUTION RESPIRATORY (INHALATION) at 19:18

## 2020-09-06 RX ADMIN — FAMOTIDINE 20 MG: 20 TABLET, FILM COATED ORAL at 16:58

## 2020-09-06 RX ADMIN — PREGABALIN 25 MG: 25 CAPSULE ORAL at 08:31

## 2020-09-06 RX ADMIN — HYDROCODONE BITARTRATE AND ACETAMINOPHEN 1 TABLET: 5; 325 TABLET ORAL at 03:16

## 2020-09-06 RX ADMIN — ALBUTEROL SULFATE 1.25 MG: 1.25 SOLUTION RESPIRATORY (INHALATION) at 15:10

## 2020-09-06 RX ADMIN — KETOROLAC TROMETHAMINE 15 MG: 15 INJECTION, SOLUTION INTRAMUSCULAR; INTRAVENOUS at 08:32

## 2020-09-06 RX ADMIN — SODIUM CHLORIDE, PRESERVATIVE FREE 10 ML: 5 INJECTION INTRAVENOUS at 08:32

## 2020-09-06 RX ADMIN — POLYETHYLENE GLYCOL 3350 17 G: 17 POWDER, FOR SOLUTION ORAL at 08:31

## 2020-09-06 RX ADMIN — SODIUM CHLORIDE, PRESERVATIVE FREE 10 ML: 5 INJECTION INTRAVENOUS at 21:23

## 2020-09-06 RX ADMIN — IPRATROPIUM BROMIDE 0.5 MG: 0.5 SOLUTION RESPIRATORY (INHALATION) at 07:16

## 2020-09-06 RX ADMIN — CYCLOBENZAPRINE HYDROCHLORIDE 5 MG: 5 TABLET, FILM COATED ORAL at 03:16

## 2020-09-06 RX ADMIN — PREGABALIN 25 MG: 25 CAPSULE ORAL at 21:22

## 2020-09-06 RX ADMIN — ENOXAPARIN SODIUM 40 MG: 40 INJECTION SUBCUTANEOUS at 05:13

## 2020-09-06 RX ADMIN — LEVOTHYROXINE SODIUM 112 MCG: 112 TABLET ORAL at 05:13

## 2020-09-06 RX ADMIN — ESTROGENS, CONJUGATED 0.3 MG: 0.3 TABLET, FILM COATED ORAL at 08:31

## 2020-09-06 RX ADMIN — METOPROLOL TARTRATE 50 MG: 50 TABLET ORAL at 21:22

## 2020-09-06 RX ADMIN — METOPROLOL TARTRATE 50 MG: 50 TABLET ORAL at 08:32

## 2020-09-06 RX ADMIN — KETOROLAC TROMETHAMINE 15 MG: 15 INJECTION, SOLUTION INTRAMUSCULAR; INTRAVENOUS at 15:09

## 2020-09-06 RX ADMIN — LIDOCAINE 2 PATCH: 50 PATCH CUTANEOUS at 08:32

## 2020-09-06 RX ADMIN — POTASSIUM CHLORIDE 20 MEQ: 750 CAPSULE, EXTENDED RELEASE ORAL at 17:01

## 2020-09-06 RX ADMIN — IPRATROPIUM BROMIDE 0.5 MG: 0.5 SOLUTION RESPIRATORY (INHALATION) at 11:17

## 2020-09-06 RX ADMIN — DOCUSATE SODIUM 100 MG: 100 CAPSULE ORAL at 08:31

## 2020-09-06 RX ADMIN — METHOCARBAMOL 500 MG: 500 TABLET ORAL at 08:31

## 2020-09-06 RX ADMIN — ALBUTEROL SULFATE 1.25 MG: 1.25 SOLUTION RESPIRATORY (INHALATION) at 11:17

## 2020-09-06 RX ADMIN — AMITRIPTYLINE HYDROCHLORIDE 150 MG: 75 TABLET, FILM COATED ORAL at 21:22

## 2020-09-06 RX ADMIN — POTASSIUM CHLORIDE 20 MEQ: 750 CAPSULE, EXTENDED RELEASE ORAL at 08:31

## 2020-09-06 RX ADMIN — KETOROLAC TROMETHAMINE 15 MG: 15 INJECTION, SOLUTION INTRAMUSCULAR; INTRAVENOUS at 02:38

## 2020-09-06 RX ADMIN — IPRATROPIUM BROMIDE 0.5 MG: 0.5 SOLUTION RESPIRATORY (INHALATION) at 19:18

## 2020-09-06 RX ADMIN — CEFTRIAXONE SODIUM 1 G: 1 INJECTION, POWDER, FOR SOLUTION INTRAMUSCULAR; INTRAVENOUS at 05:13

## 2020-09-06 RX ADMIN — KETOROLAC TROMETHAMINE 15 MG: 15 INJECTION, SOLUTION INTRAMUSCULAR; INTRAVENOUS at 21:23

## 2020-09-06 RX ADMIN — CEFTRIAXONE SODIUM 1 G: 1 INJECTION, POWDER, FOR SOLUTION INTRAMUSCULAR; INTRAVENOUS at 17:01

## 2020-09-06 RX ADMIN — ALBUTEROL SULFATE 1.25 MG: 1.25 SOLUTION RESPIRATORY (INHALATION) at 07:16

## 2020-09-06 RX ADMIN — METHOCARBAMOL 500 MG: 500 TABLET ORAL at 21:22

## 2020-09-06 RX ADMIN — FAMOTIDINE 20 MG: 20 TABLET, FILM COATED ORAL at 08:31

## 2020-09-06 RX ADMIN — IPRATROPIUM BROMIDE 0.5 MG: 0.5 SOLUTION RESPIRATORY (INHALATION) at 15:10

## 2020-09-06 RX ADMIN — METHOCARBAMOL 500 MG: 500 TABLET ORAL at 16:58

## 2020-09-06 NOTE — PLAN OF CARE
Problem: Patient Care Overview  Goal: Plan of Care Review  Outcome: Ongoing (interventions implemented as appropriate)  Flowsheets (Taken 9/6/2020 0059)  Progress: improving  Plan of Care Reviewed With: patient  Outcome Summary: CT x2 to gravity with minimal output.  IID.  Medicated with scheduled and prn pain meds.  VSS  Up ad sher.  Tele-sinus/sinus tach.  Cont to monitor.

## 2020-09-06 NOTE — PLAN OF CARE
Pt awake and alert. C/O mild dull achy incisional right lateral chest discomfort. Pt reports good pain control with medications. Chest tubes were discontinued per Dr. Coburn this am. Pt tolerated well and has had no resp. Distress. Follow-up chest xray is pending. IID. Continue IV antibiotics. Pt up ambulating in hallway. Voiding without difficulty. Tolerating diet. VSS. Possible d/c tomorrow.

## 2020-09-06 NOTE — NURSING NOTE
Patient is awake, alert, and oriented x 4.  She has had a few coughing spells throughout the night.  She started on 4L nasal cannula.  After she started coughing, her 02 saturation dropped to 87% and sustained.  She was placed on a high flow nasal cannula and was titrated up to 10L before her 02 saturation came back up to 92%.  She has been titrated off of the high flow as she has tolerated.  She is currently now on 7L high flow and maintaining a sat of 92-97%.  She is still complaining of right sided rib pain which has been relieved with PRN pain medication. Still has remained NPO with sips of water.  Her Amylase and Lipase are significantly lower than her initial results.  WBC count is a little better this morning as well. Remains afebrile.  All vitals stable at this time. Will continue to monitor her closely.    
Patient refused chest tube dressing change this AM.     Dustin Casas RN  
Wearing gloves and face mask ppe  
normal...

## 2020-09-06 NOTE — PROGRESS NOTES
"    [facility] Cardiothoracic Surgery  PROGRESS NOTE     Subjective:  Doing much better today has walked in the halls.  Pain is much improved.  Eating better as well.  Shortness of breath and cough improved.    Objective:      /42 (BP Location: Right arm, Patient Position: Sitting)   Pulse 103   Temp 97.7 °F (36.5 °C) (Oral)   Resp 18   Ht 170.2 cm (67\")   Wt 73.7 kg (162 lb 6 oz)   LMP 08/31/2012   SpO2 94%   BMI 25.43 kg/m²       Intake/Output Summary (Last 24 hours) at 9/6/2020 1019  Last data filed at 9/6/2020 0811  Gross per 24 hour   Intake 360 ml   Output 2600 ml   Net -2240 ml         PE:  Vitals:    09/06/20 0811   BP: 103/42   Pulse: 103   Resp: 18   Temp: 97.7 °F (36.5 °C)   SpO2: 94%     GENERAL: NAD, resting comfortably, normal palor  CARDIOVASCULAR: regular, regular rate, sinus  PULMONARY:  Decreased breath sounds on right normal breath sounds on the left, no labored breathing, right chest tubes with straw-colored fluid drainage no air leak  ABDOMEN: soft, nt/nd  EXTREMITIES: mild peripheral edema, normal pulses, normal ROM    WBC   Date Value Ref Range Status   09/06/2020 16.32 (H) 3.40 - 10.80 10*3/mm3 Final     RBC   Date Value Ref Range Status   09/06/2020 3.05 (L) 3.77 - 5.28 10*6/mm3 Final     Hemoglobin   Date Value Ref Range Status   09/06/2020 9.1 (L) 12.0 - 15.9 g/dL Final     Hematocrit   Date Value Ref Range Status   09/06/2020 27.1 (L) 34.0 - 46.6 % Final     MCV   Date Value Ref Range Status   09/06/2020 88.9 79.0 - 97.0 fL Final     MCH   Date Value Ref Range Status   09/06/2020 29.8 26.6 - 33.0 pg Final     MCHC   Date Value Ref Range Status   09/06/2020 33.6 31.5 - 35.7 g/dL Final     RDW   Date Value Ref Range Status   09/06/2020 13.8 12.3 - 15.4 % Final     RDW-SD   Date Value Ref Range Status   09/06/2020 45.1 37.0 - 54.0 fl Final     MPV   Date Value Ref Range Status   09/06/2020 8.5 6.0 - 12.0 fL Final     Platelets   Date Value Ref Range Status "   09/06/2020 859 (H) 140 - 450 10*3/mm3 Final     Neutrophil %   Date Value Ref Range Status   09/06/2020 80.0 (H) 42.7 - 76.0 % Final     Lymphocyte %   Date Value Ref Range Status   09/06/2020 7.2 (L) 19.6 - 45.3 % Final     Monocyte %   Date Value Ref Range Status   09/06/2020 7.7 5.0 - 12.0 % Final     Eosinophil %   Date Value Ref Range Status   09/06/2020 2.6 0.3 - 6.2 % Final     Basophil %   Date Value Ref Range Status   09/06/2020 0.4 0.0 - 1.5 % Final     Immature Grans %   Date Value Ref Range Status   09/06/2020 2.1 (H) 0.0 - 0.5 % Final     Neutrophils, Absolute   Date Value Ref Range Status   09/06/2020 13.07 (H) 1.70 - 7.00 10*3/mm3 Final     Lymphocytes, Absolute   Date Value Ref Range Status   09/06/2020 1.17 0.70 - 3.10 10*3/mm3 Final     Monocytes, Absolute   Date Value Ref Range Status   09/06/2020 1.26 (H) 0.10 - 0.90 10*3/mm3 Final     Eosinophils, Absolute   Date Value Ref Range Status   09/06/2020 0.42 (H) 0.00 - 0.40 10*3/mm3 Final     Basophils, Absolute   Date Value Ref Range Status   09/06/2020 0.06 0.00 - 0.20 10*3/mm3 Final     Immature Grans, Absolute   Date Value Ref Range Status   09/06/2020 0.34 (H) 0.00 - 0.05 10*3/mm3 Final     nRBC   Date Value Ref Range Status   09/06/2020 0.0 0.0 - 0.2 /100 WBC Final     Basic Metabolic Panel    Sodium Sodium   Date Value Ref Range Status   09/06/2020 134 (L) 136 - 145 mmol/L Final   09/05/2020 134 (L) 136 - 145 mmol/L Final   09/04/2020 135 (L) 136 - 145 mmol/L Final      Potassium Potassium   Date Value Ref Range Status   09/06/2020 4.2 3.5 - 5.2 mmol/L Final     Comment:     Slight hemolysis detected by analyzer. Results may be affected.   09/05/2020 3.8 3.5 - 5.2 mmol/L Final   09/04/2020 3.4 (L) 3.5 - 5.2 mmol/L Final      Chloride Chloride   Date Value Ref Range Status   09/06/2020 98 98 - 107 mmol/L Final   09/05/2020 95 (L) 98 - 107 mmol/L Final   09/04/2020 94 (L) 98 - 107 mmol/L Final      Bicarbonate No results found for:  PLASMABICARB   BUN BUN   Date Value Ref Range Status   09/06/2020 8 6 - 20 mg/dL Final   09/05/2020 8 6 - 20 mg/dL Final   09/04/2020 11 6 - 20 mg/dL Final      Creatinine Creatinine   Date Value Ref Range Status   09/06/2020 0.35 (L) 0.57 - 1.00 mg/dL Final   09/05/2020 0.32 (L) 0.57 - 1.00 mg/dL Final   09/04/2020 0.29 (L) 0.57 - 1.00 mg/dL Final      Calcium Calcium   Date Value Ref Range Status   09/06/2020 8.0 (L) 8.6 - 10.5 mg/dL Final   09/05/2020 7.8 (L) 8.6 - 10.5 mg/dL Final   09/04/2020 7.9 (L) 8.6 - 10.5 mg/dL Final      Glucose      No components found for: GLUCOSE.*           CXR: Improved aeration of right lung, decreasing right pleural effusion    Assessment/Plan     Impression: 49-year-old female with complicated right lower lobe loculated pleural effusion.  This is in the setting of pneumonia.  Improving postop day 4 from VATS right pleurodesis and decortication.  Chest tube output is decreasing and I believe reasonable to remove chest tubes today.  Her cultures have shown Streptococcus species in both the pleural fluid and pleural peel.    Plan:  Chest tubes removed at bedside this morning repeat chest x-ray 4 hours after removal  Agree with speciated directed antibiotic therapy would favor 2-week course of antibiotics given pleural cavity infection  Toradol DC'd, expect much improvement with pain after chest tube removal  Out of bed and walking is much as possible today  Be okay with discharge in next 1 to 3 days pending improvement.    Please call with any questions or concerns    Tyler Coburn M.D.  Cardiothoracic Surgeon

## 2020-09-06 NOTE — PROGRESS NOTES
[facility] Medicine Services  INPATIENT PROGRESS NOTE    Length of Stay: 7  Date of Admission: 8/30/2020  Primary Care Physician: Raegan Dubose DO    Subjective   Chief Complaint: Pneumonia/loculated pleural effusion/shortness of breath/pancreatitis/leukocytosis/fever.    HPI   T-max 98.8.  T-current 96.9.  Chest tube has been pulled.  Patient is amatory.  Slightly hypotensive.  Discussed with patient possible go home tomorrow.    Review of Systems   Constitutional: Positive for activity change, appetite change and fatigue. Negative for chills and fever.   HENT: Negative for hearing loss, nosebleeds, tinnitus and trouble swallowing.    Eyes: Negative for visual disturbance.   Respiratory: Patient is off oxygen.  Chest pain from chest tube.  Coughing has improved.  Cardiovascular: Chest pain from chest tube.  No palpitations and leg swelling.   Gastrointestinal: Negative for abdominal distention, abdominal pain, blood in stool, constipation, diarrhea, nausea and vomiting.   Endocrine: Negative for cold intolerance, heat intolerance, polydipsia, polyphagia and polyuria.   Genitourinary: Negative for decreased urine volume, difficulty urinating, dysuria, flank pain, frequency and hematuria.   Musculoskeletal: Positive for arthralgias, gait problem and myalgias. Negative for joint swelling.   Skin: Negative for rash.   Allergic/Immunologic: Negative for immunocompromised state.   Neurological: Positive for weakness. Negative for dizziness, syncope, light-headedness and headaches.   Hematological: Negative for adenopathy. Does not bruise/bleed easily.   Psychiatric/Behavioral: Negative for confusion and sleep disturbance. The patient is not nervous/anxious.        All pertinent negatives and positives are as above. All other systems have been reviewed and are negative unless otherwise stated.     Objective    Temp:  [96.9 °F (36.1 °C)-98.8 °F (37.1 °C)] 96.9 °F (36.1  °C)  Heart Rate:  [] 86  Resp:  [18] 18  BP: ()/(42-54) 91/54    Intake/Output Summary (Last 24 hours) at 9/6/2020 1550  Last data filed at 9/6/2020 1330  Gross per 24 hour   Intake 600 ml   Output 2590 ml   Net -1990 ml     Physical Exam  Constitutional: She is oriented to person, place, and time. She appears well-developed.   HENT:   Head: Normocephalic.   Eyes: Pupils are equal, round, and reactive to light. Conjunctivae are normal.   Neck: Neck supple. No JVD present.   Cardiovascular: Normal heart sounds and intact distal pulses. Exam reveals no gallop and no friction rub.   No murmur heard.  Pulmonary/Chest: No respiratory distress. She has no rales. She exhibits no tenderness.  Decrease in breath sound right lung.   2 Chest tubes in place right chest/drain to gravity.  Abdominal: Soft. Bowel sounds are normal. She exhibits no distension.  Nontender abdomen.  There is no rebound and no guarding.   Musculoskeletal: Normal range of motion. She exhibits no edema, tenderness or deformity.   Neurological: She is alert and oriented to person, place, and time. She displays normal reflexes. No cranial nerve deficit. She exhibits abnormal muscle tone. Coordination abnormal.   Skin: Skin is warm and dry. Capillary refill takes 2 to 3 seconds. No rash noted.   Psychiatric: She has a normal mood and affect. Her behavior is normal. Judgment and thought content normal.   Nursing note and vitals reviewed.  Results Review:  Lab Results (last 24 hours)     Procedure Component Value Units Date/Time    Comprehensive Metabolic Panel [[id number]]  (Abnormal) Collected:  09/06/20 0511    Specimen:  Blood Updated:  09/06/20 0603     Glucose 122 mg/dL      BUN 8 mg/dL      Creatinine 0.35 mg/dL      Sodium 134 mmol/L      Potassium 4.2 mmol/L      Comment: Slight hemolysis detected by analyzer. Results may be affected.        Chloride 98 mmol/L      CO2 25.0 mmol/L      Calcium 8.0 mg/dL      Total Protein 5.7 g/dL       Albumin 2.30 g/dL      ALT (SGPT) 50 U/L      AST (SGOT) 45 U/L      Alkaline Phosphatase 325 U/L      Total Bilirubin 0.2 mg/dL      eGFR Non African Amer >150 mL/min/1.73      Globulin 3.4 gm/dL      A/G Ratio 0.7 g/dL      BUN/Creatinine Ratio 22.9     Anion Gap 11.0 mmol/L     Narrative:       GFR Normal >60  Chronic Kidney Disease <60  Kidney Failure <15      CBC & Differential [[id number]] Collected:  09/06/20 0511    Specimen:  Blood Updated:  09/06/20 0550    Narrative:       The following orders were created for panel order CBC & Differential.  Procedure                               Abnormality         Status                     ---------                               -----------         ------                     CBC Auto Differential[[id number]]        Abnormal            Final result                 Please view results for these tests on the individual orders.    CBC Auto Differential [[id number]]  (Abnormal) Collected:  09/06/20 0511    Specimen:  Blood Updated:  09/06/20 0550     WBC 16.32 10*3/mm3      RBC 3.05 10*6/mm3      Hemoglobin 9.1 g/dL      Hematocrit 27.1 %      MCV 88.9 fL      MCH 29.8 pg      MCHC 33.6 g/dL      RDW 13.8 %      RDW-SD 45.1 fl      MPV 8.5 fL      Platelets 859 10*3/mm3      Neutrophil % 80.0 %      Lymphocyte % 7.2 %      Monocyte % 7.7 %      Eosinophil % 2.6 %      Basophil % 0.4 %      Immature Grans % 2.1 %      Neutrophils, Absolute 13.07 10*3/mm3      Lymphocytes, Absolute 1.17 10*3/mm3      Monocytes, Absolute 1.26 10*3/mm3      Eosinophils, Absolute 0.42 10*3/mm3      Basophils, Absolute 0.06 10*3/mm3      Immature Grans, Absolute 0.34 10*3/mm3      nRBC 0.0 /100 WBC            Cultures:  Blood Culture   Date Value Ref Range Status   08/31/2020 No growth at 5 days  Final   08/31/2020 No growth at 5 days  Final     Respiratory Culture   Date Value Ref Range Status   09/01/2020   Final    Scant growth (1+) Normal Respiratory Sharla: NO S.aureus/MRSA or Pseudomonas  aeruginosa       Radiology Data:    Imaging Results (Last 24 Hours)     Procedure Component Value Units Date/Time    XR Chest 1 View [[id number]] Collected:  09/06/20 1106     Updated:  09/06/20 1111    Narrative:       EXAMINATION: XR CHEST 1 -     9/6/2020 10:53 AM CDT     HISTORY: empyema, f/u after chest tube removal; W51-Mdyyqgf effusion,  not elsewhere classified     1 view chest x-ray compared with 4:18 AM.     Interval removal of 2 right chest tubes.     Very small right apical pneumothorax.  The pleural reflection lies 18 mm below the bony apex.     Bibasilar atelectasis.     Normal heart size.     Summary:  1. Right chest tube removal with very small right apical pneumothorax  which is slightly larger than 6 hours ago.  This report was finalized on 09/06/2020 11:08 by Dr. Boubacar Prieto MD.    XR Chest 1 View [[id number]] Collected:  09/06/20 0825     Updated:  09/06/20 0829    Narrative:       EXAMINATION: XR CHEST 1 -     9/6/2020 3:45 AM CDT     HISTORY: Empyema on right, s/p right VATS washout     2 right chest tubes remain in place.     Trace right apical pneumothorax now present.     Slightly improved lung expansion with partial clearing of basilar  infiltrate.     Scoliosis.  Normal heart size.     Summary:  1. Trace right apical pneumothorax.  2. Slightly improved lung expansion with partial clearing of basilar  infiltrate/atelectasis.  This report was finalized on 09/06/2020 08:26 by Dr. Boubacar Prieto MD.          No Known Allergies    Scheduled meds:     albuterol 1.25 mg Nebulization 4x Daily - RT   amitriptyline 150 mg Oral Nightly   cefTRIAXone 1 g Intravenous Q12H   docusate sodium 100 mg Oral Daily   enoxaparin 40 mg Subcutaneous Q24H   estrogens (conjugated) 0.3 mg Oral Daily   famotidine 20 mg Oral BID AC   ipratropium 0.5 mg Nebulization 4x Daily - RT   ketorolac 15 mg Intravenous Q6H   levothyroxine 112 mcg Oral Q AM   lidocaine 2 patch Transdermal Q24H   methocarbamol 500 mg Oral TID    metoprolol tartrate 50 mg Oral Q12H   polyethylene glycol 17 g Oral Daily   potassium chloride 20 mEq Oral BID With Meals   pregabalin 25 mg Oral Q12H   sodium chloride 10 mL Intravenous Q12H       PRN meds:  •  acetaminophen **OR** acetaminophen  •  benzonatate  •  cyclobenzaprine  •  HYDROcodone-acetaminophen  •  Morphine **AND** naloxone  •  ondansetron  •  ondansetron **OR** ondansetron  •  Pharmacy to Dose enoxaparin (LOVENOX)  •  sodium chloride    Assessment/Plan       Acute pancreatitis    Pneumonitis    Pleural effusion on right    Elevated liver enzymes    Empyema, right (CMS/HCC)    Leukocytosis      Plan:  Right loculated pleural effusion-empyema/pneumonia/right pneumonitis/coughing.  DC Zosyn and vancomycin.  Start Rocephin antibiotics.  This can be changed to Omnicef upon discharge total 2 weeks worth of antibiotic.  Tessalon Perle PRN.  Cont CT surgeon.  Atrovent nebs.  Incentive spirometer.  Lasix 40 mg x 1.  CT scan of the chest-Moderate lobulated/loculated right pleural effusion with loculated fluid in the right major fissure, scattered bilateral patchy pulmonary opacities representing multifocal pneumonia, trapped portions of the right lower lobe with compressive atelectasis of right middle and lower lobes, reactive mediastinal lymph nodes.  Status post thoracentesis 9/1/2020.   Status post VATS 9/2/2020.  2 Chest tubes in place.  Leukocytosis-stable.  Chest x-ray-right chest tube removal with very small right apical pneumothorax which is slightly larger than 6 hours ago.     Acute pancreatitis.  Lipase resolved.  GI sign off.   CT scan abdomen pelvic 8/30/2020 [facility]- mild acute pancreatitis-no suggestion of pancreatic necrosis or pseudocyst formation, cystocele, status post hysterectomy and bilateral salpingo-oophorectomy     Elevated liver enzyme/gallbladder sludge.  Liver enzyme  improving.  Acute hepatitis panel- neg.   Ultrasound of the liver-Sludge is present in the  gallbladder, liver is unremarkable, hepatopedal flow is present in the portal vein.     Hypertension/tachycardia.  Tachycardia resolved.  Cont Lopressor.    Echocardiogram-ejection fraction 60-65%, mild mitral valve regurgitation.      Hypokalemia. Resolved.      Anemia.  Hemoglobin stable.  No sign of acute bleed.     Depression.  Elavil at night.      Postmenopausal.  Premarin.      Hypothyroidism.  Continue Synthroid.     Pain control.  Morphine as needed.  Flexeril as needed. Percocet PRN.     Prophylaxis Lovenox.     Nausea.  Zofran PRN.  Pepcid.     Nutrition.  Advance diet as tolerated.     Blood cultures-no growth 5 days.  Body fluid culture- light growth of Streptococcus intermedius.  Anaerobic culture-no growth in 3 days.  Fungal culture pending.  MRSA DNA probe-negative.  COVID-19-negative.  Respiratory PCR-negative.  Respiratory culture mixed gram-positive simran.  Cytology- right pleural cavity thoracentesis - negative for malignancy, acute inflammation..    Tissue culture-no growth.  Fungal culture pending.     Discharge Plannin-3  days.  Transfer from [facility].     Electronically signed by Randolph Liang MD, 20, 3:50 PM.

## 2020-09-07 ENCOUNTER — APPOINTMENT (OUTPATIENT)
Dept: GENERAL RADIOLOGY | Facility: HOSPITAL | Age: 50
End: 2020-09-07

## 2020-09-07 VITALS
RESPIRATION RATE: 16 BRPM | DIASTOLIC BLOOD PRESSURE: 45 MMHG | OXYGEN SATURATION: 98 % | HEART RATE: 98 BPM | WEIGHT: 149 LBS | BODY MASS INDEX: 23.39 KG/M2 | HEIGHT: 67 IN | SYSTOLIC BLOOD PRESSURE: 112 MMHG | TEMPERATURE: 97.5 F

## 2020-09-07 LAB
ALBUMIN SERPL-MCNC: 2.7 G/DL (ref 3.5–5.2)
ALBUMIN/GLOB SERPL: 0.8 G/DL
ALP SERPL-CCNC: 324 U/L (ref 39–117)
ALT SERPL W P-5'-P-CCNC: 68 U/L (ref 1–33)
ANION GAP SERPL CALCULATED.3IONS-SCNC: 9 MMOL/L (ref 5–15)
AST SERPL-CCNC: 63 U/L (ref 1–32)
BACTERIA SPEC ANAEROBE CULT: NORMAL
BASOPHILS # BLD AUTO: 0.08 10*3/MM3 (ref 0–0.2)
BASOPHILS NFR BLD AUTO: 0.6 % (ref 0–1.5)
BILIRUB SERPL-MCNC: 0.2 MG/DL (ref 0–1.2)
BUN SERPL-MCNC: 7 MG/DL (ref 6–20)
BUN/CREAT SERPL: 21.2 (ref 7–25)
CALCIUM SPEC-SCNC: 8.7 MG/DL (ref 8.6–10.5)
CHLORIDE SERPL-SCNC: 98 MMOL/L (ref 98–107)
CO2 SERPL-SCNC: 27 MMOL/L (ref 22–29)
CREAT SERPL-MCNC: 0.33 MG/DL (ref 0.57–1)
DEPRECATED RDW RBC AUTO: 45.6 FL (ref 37–54)
EOSINOPHIL # BLD AUTO: 0.55 10*3/MM3 (ref 0–0.4)
EOSINOPHIL NFR BLD AUTO: 4.3 % (ref 0.3–6.2)
ERYTHROCYTE [DISTWIDTH] IN BLOOD BY AUTOMATED COUNT: 13.8 % (ref 12.3–15.4)
GFR SERPL CREATININE-BSD FRML MDRD: >150 ML/MIN/1.73
GLOBULIN UR ELPH-MCNC: 3.2 GM/DL
GLUCOSE SERPL-MCNC: 107 MG/DL (ref 65–99)
HCT VFR BLD AUTO: 27.8 % (ref 34–46.6)
HGB BLD-MCNC: 8.8 G/DL (ref 12–15.9)
IMM GRANULOCYTES # BLD AUTO: 0.36 10*3/MM3 (ref 0–0.05)
IMM GRANULOCYTES NFR BLD AUTO: 2.8 % (ref 0–0.5)
LYMPHOCYTES # BLD AUTO: 1.71 10*3/MM3 (ref 0.7–3.1)
LYMPHOCYTES NFR BLD AUTO: 13.4 % (ref 19.6–45.3)
MCH RBC QN AUTO: 28.6 PG (ref 26.6–33)
MCHC RBC AUTO-ENTMCNC: 31.7 G/DL (ref 31.5–35.7)
MCV RBC AUTO: 90.3 FL (ref 79–97)
MONOCYTES # BLD AUTO: 1.46 10*3/MM3 (ref 0.1–0.9)
MONOCYTES NFR BLD AUTO: 11.5 % (ref 5–12)
NEUTROPHILS NFR BLD AUTO: 67.4 % (ref 42.7–76)
NEUTROPHILS NFR BLD AUTO: 8.57 10*3/MM3 (ref 1.7–7)
NRBC BLD AUTO-RTO: 0 /100 WBC (ref 0–0.2)
PLATELET # BLD AUTO: 987 10*3/MM3 (ref 140–450)
PMV BLD AUTO: 8.3 FL (ref 6–12)
POTASSIUM SERPL-SCNC: 4.4 MMOL/L (ref 3.5–5.2)
PROT SERPL-MCNC: 5.9 G/DL (ref 6–8.5)
RBC # BLD AUTO: 3.08 10*6/MM3 (ref 3.77–5.28)
SODIUM SERPL-SCNC: 134 MMOL/L (ref 136–145)
WBC # BLD AUTO: 12.73 10*3/MM3 (ref 3.4–10.8)

## 2020-09-07 PROCEDURE — 25010000002 ENOXAPARIN PER 10 MG: Performed by: FAMILY MEDICINE

## 2020-09-07 PROCEDURE — 85025 COMPLETE CBC W/AUTO DIFF WBC: CPT | Performed by: FAMILY MEDICINE

## 2020-09-07 PROCEDURE — 80053 COMPREHEN METABOLIC PANEL: CPT | Performed by: FAMILY MEDICINE

## 2020-09-07 PROCEDURE — 25010000002 KETOROLAC TROMETHAMINE PER 15 MG: Performed by: NURSE PRACTITIONER

## 2020-09-07 PROCEDURE — 94799 UNLISTED PULMONARY SVC/PX: CPT

## 2020-09-07 PROCEDURE — 25010000002 CEFTRIAXONE PER 250 MG: Performed by: FAMILY MEDICINE

## 2020-09-07 PROCEDURE — 99024 POSTOP FOLLOW-UP VISIT: CPT | Performed by: SURGERY

## 2020-09-07 PROCEDURE — 71045 X-RAY EXAM CHEST 1 VIEW: CPT

## 2020-09-07 RX ORDER — CEFDINIR 300 MG/1
300 CAPSULE ORAL 2 TIMES DAILY
Qty: 28 CAPSULE | Refills: 0 | Status: SHIPPED | OUTPATIENT
Start: 2020-09-07 | End: 2020-10-05

## 2020-09-07 RX ORDER — METOPROLOL TARTRATE 50 MG/1
50 TABLET, FILM COATED ORAL EVERY 12 HOURS SCHEDULED
Qty: 60 TABLET | Refills: 0 | Status: SHIPPED | OUTPATIENT
Start: 2020-09-07 | End: 2020-10-05

## 2020-09-07 RX ADMIN — IPRATROPIUM BROMIDE 0.5 MG: 0.5 SOLUTION RESPIRATORY (INHALATION) at 06:31

## 2020-09-07 RX ADMIN — ALBUTEROL SULFATE 1.25 MG: 1.25 SOLUTION RESPIRATORY (INHALATION) at 10:22

## 2020-09-07 RX ADMIN — LIDOCAINE 2 PATCH: 50 PATCH CUTANEOUS at 08:50

## 2020-09-07 RX ADMIN — DOCUSATE SODIUM 100 MG: 100 CAPSULE ORAL at 08:51

## 2020-09-07 RX ADMIN — POTASSIUM CHLORIDE 20 MEQ: 750 CAPSULE, EXTENDED RELEASE ORAL at 08:51

## 2020-09-07 RX ADMIN — FAMOTIDINE 20 MG: 20 TABLET, FILM COATED ORAL at 08:51

## 2020-09-07 RX ADMIN — KETOROLAC TROMETHAMINE 15 MG: 15 INJECTION, SOLUTION INTRAMUSCULAR; INTRAVENOUS at 03:59

## 2020-09-07 RX ADMIN — LEVOTHYROXINE SODIUM 112 MCG: 112 TABLET ORAL at 05:14

## 2020-09-07 RX ADMIN — ENOXAPARIN SODIUM 40 MG: 40 INJECTION SUBCUTANEOUS at 05:14

## 2020-09-07 RX ADMIN — METOPROLOL TARTRATE 50 MG: 50 TABLET ORAL at 10:51

## 2020-09-07 RX ADMIN — KETOROLAC TROMETHAMINE 15 MG: 15 INJECTION, SOLUTION INTRAMUSCULAR; INTRAVENOUS at 09:05

## 2020-09-07 RX ADMIN — ESTROGENS, CONJUGATED 0.3 MG: 0.3 TABLET, FILM COATED ORAL at 08:52

## 2020-09-07 RX ADMIN — SODIUM CHLORIDE, PRESERVATIVE FREE 10 ML: 5 INJECTION INTRAVENOUS at 08:52

## 2020-09-07 RX ADMIN — IPRATROPIUM BROMIDE 0.5 MG: 0.5 SOLUTION RESPIRATORY (INHALATION) at 10:22

## 2020-09-07 RX ADMIN — ALBUTEROL SULFATE 1.25 MG: 1.25 SOLUTION RESPIRATORY (INHALATION) at 06:31

## 2020-09-07 RX ADMIN — CEFTRIAXONE SODIUM 1 G: 1 INJECTION, POWDER, FOR SOLUTION INTRAMUSCULAR; INTRAVENOUS at 05:14

## 2020-09-07 RX ADMIN — PREGABALIN 25 MG: 25 CAPSULE ORAL at 10:51

## 2020-09-07 NOTE — PROGRESS NOTES
"    [facility] Cardiothoracic Surgery  PROGRESS NOTE     Subjective:  Well overnight eating better and walking better.  Small right apical pneumo is stable in size.    Objective:      BP 99/60 (BP Location: Right arm, Patient Position: Sitting)   Pulse 92   Temp 97.5 °F (36.4 °C) (Oral)   Resp 16   Ht 170.2 cm (67\")   Wt 67.6 kg (149 lb)   LMP 08/31/2012   SpO2 97%   BMI 23.34 kg/m²       Intake/Output Summary (Last 24 hours) at 9/7/2020 0836  Last data filed at 9/7/2020 0643  Gross per 24 hour   Intake 240 ml   Output 2700 ml   Net -2460 ml     ??CT out ??    PE:  Vitals:    09/07/20 0800   BP: 99/60   Pulse: 92   Resp: 16   Temp: 97.5 °F (36.4 °C)   SpO2: 97%     GENERAL: NAD, resting comfortably, normal color  CARDIOVASCULAR: regular, regular rate, sinus  PULMONARY: Crackles and decreased breath sounds on right normal breath sounds on left, no labored breathing  ABDOMEN: soft, nt/nd  EXTREMITIES: mild peripheral edema, normal pulses, normal ROM        Lab Results (last 72 hours)     Procedure Component Value Units Date/Time    Comprehensive Metabolic Panel [[id number]]  (Abnormal) Collected:  09/07/20 0514    Specimen:  Blood Updated:  09/07/20 0634     Glucose 107 mg/dL      BUN 7 mg/dL      Creatinine 0.33 mg/dL      Sodium 134 mmol/L      Potassium 4.4 mmol/L      Chloride 98 mmol/L      CO2 27.0 mmol/L      Calcium 8.7 mg/dL      Total Protein 5.9 g/dL      Albumin 2.70 g/dL      ALT (SGPT) 68 U/L      AST (SGOT) 63 U/L      Alkaline Phosphatase 324 U/L      Total Bilirubin 0.2 mg/dL      eGFR Non African Amer >150 mL/min/1.73      Globulin 3.2 gm/dL      A/G Ratio 0.8 g/dL      BUN/Creatinine Ratio 21.2     Anion Gap 9.0 mmol/L     Narrative:       GFR Normal >60  Chronic Kidney Disease <60  Kidney Failure <15      CBC & Differential [[id number]] Collected:  09/07/20 0514    Specimen:  Blood Updated:  09/07/20 0618    Narrative:       The following orders were created for panel order " CBC & Differential.  Procedure                               Abnormality         Status                     ---------                               -----------         ------                     CBC Auto Differential[[id number]]        Abnormal            Final result                 Please view results for these tests on the individual orders.    CBC Auto Differential [[id number]]  (Abnormal) Collected:  09/07/20 0514    Specimen:  Blood Updated:  09/07/20 0618     WBC 12.73 10*3/mm3      RBC 3.08 10*6/mm3      Hemoglobin 8.8 g/dL      Hematocrit 27.8 %      MCV 90.3 fL      MCH 28.6 pg      MCHC 31.7 g/dL      RDW 13.8 %      RDW-SD 45.6 fl      MPV 8.3 fL      Platelets 987 10*3/mm3      Neutrophil % 67.4 %      Lymphocyte % 13.4 %      Monocyte % 11.5 %      Eosinophil % 4.3 %      Basophil % 0.6 %      Immature Grans % 2.8 %      Neutrophils, Absolute 8.57 10*3/mm3      Lymphocytes, Absolute 1.71 10*3/mm3      Monocytes, Absolute 1.46 10*3/mm3      Eosinophils, Absolute 0.55 10*3/mm3      Basophils, Absolute 0.08 10*3/mm3      Immature Grans, Absolute 0.36 10*3/mm3      nRBC 0.0 /100 WBC     Comprehensive Metabolic Panel [[id number]]  (Abnormal) Collected:  09/06/20 0511    Specimen:  Blood Updated:  09/06/20 0603     Glucose 122 mg/dL      BUN 8 mg/dL      Creatinine 0.35 mg/dL      Sodium 134 mmol/L      Potassium 4.2 mmol/L      Comment: Slight hemolysis detected by analyzer. Results may be affected.        Chloride 98 mmol/L      CO2 25.0 mmol/L      Calcium 8.0 mg/dL      Total Protein 5.7 g/dL      Albumin 2.30 g/dL      ALT (SGPT) 50 U/L      AST (SGOT) 45 U/L      Alkaline Phosphatase 325 U/L      Total Bilirubin 0.2 mg/dL      eGFR Non African Amer >150 mL/min/1.73      Globulin 3.4 gm/dL      A/G Ratio 0.7 g/dL      BUN/Creatinine Ratio 22.9     Anion Gap 11.0 mmol/L     Narrative:       GFR Normal >60  Chronic Kidney Disease <60  Kidney Failure <15      CBC & Differential [[id number]] Collected:   09/06/20 0511    Specimen:  Blood Updated:  09/06/20 0550    Narrative:       The following orders were created for panel order CBC & Differential.  Procedure                               Abnormality         Status                     ---------                               -----------         ------                     CBC Auto Differential[[id number]]        Abnormal            Final result                 Please view results for these tests on the individual orders.    CBC Auto Differential [[id number]]  (Abnormal) Collected:  09/06/20 0511    Specimen:  Blood Updated:  09/06/20 0550     WBC 16.32 10*3/mm3      RBC 3.05 10*6/mm3      Hemoglobin 9.1 g/dL      Hematocrit 27.1 %      MCV 88.9 fL      MCH 29.8 pg      MCHC 33.6 g/dL      RDW 13.8 %      RDW-SD 45.1 fl      MPV 8.5 fL      Platelets 859 10*3/mm3      Neutrophil % 80.0 %      Lymphocyte % 7.2 %      Monocyte % 7.7 %      Eosinophil % 2.6 %      Basophil % 0.4 %      Immature Grans % 2.1 %      Neutrophils, Absolute 13.07 10*3/mm3      Lymphocytes, Absolute 1.17 10*3/mm3      Monocytes, Absolute 1.26 10*3/mm3      Eosinophils, Absolute 0.42 10*3/mm3      Basophils, Absolute 0.06 10*3/mm3      Immature Grans, Absolute 0.34 10*3/mm3      nRBC 0.0 /100 WBC     Anaerobic Culture - Body Fluid, Bronchus [[id number]] Collected:  09/02/20 1539    Specimen:  Body Fluid from Bronchus Updated:  09/05/20 0902     Anaerobic Culture No anaerobes isolated at 3 days    Anaerobic Culture - Body Fluid, Pleural Cavity [[id number]] Collected:  09/02/20 1608    Specimen:  Body Fluid from Pleural Cavity Updated:  09/05/20 0902     Anaerobic Culture No anaerobes isolated at 3 days    Blood Culture With KENISHA - Blood, Arm, Right [[id number]] Collected:  08/31/20 0638    Specimen:  Blood from Arm, Right Updated:  09/05/20 0900     Blood Culture No growth at 5 days    Blood Culture With KENISHA - Blood, Hand, Right [[id number]] Collected:  08/31/20 0614    Specimen:  Blood from Hand, Right  Updated:  09/05/20 0900     Blood Culture No growth at 5 days    Anaerobic Culture - Body Fluid, Bronchus [[id number]] Collected:  09/02/20 1542    Specimen:  Body Fluid from Bronchus Updated:  09/05/20 0852     Anaerobic Culture No anaerobes isolated at 3 days    Anaerobic Culture - Tissue, Pleural Cavity [[id number]] Collected:  09/02/20 1614    Specimen:  Tissue from Pleural Cavity Updated:  09/05/20 0852     Anaerobic Culture No anaerobes isolated at 3 days    Body Fluid Culture - Body Fluid, Bronchus [[id number]] Collected:  09/02/20 1542    Specimen:  Body Fluid from Bronchus Updated:  09/05/20 0615     Body Fluid Culture No growth at 3 days     Gram Stain Moderate (3+) WBCs seen      No organisms seen    Body Fluid Culture - Body Fluid, Bronchus [[id number]]  (Abnormal) Collected:  09/02/20 1539    Specimen:  Body Fluid from Bronchus Updated:  09/05/20 0608     Body Fluid Culture Rare Streptococcus intermedius     Gram Stain Moderate (3+) WBCs seen      Few (2+) Gram negative bacilli    Narrative:       Refer to previous body fluid culture collected on 9-1-20 for MICs    Tissue / Bone Culture - Tissue, Pleural Cavity [[id number]]  (Abnormal) Collected:  09/02/20 1614    Specimen:  Tissue from Pleural Cavity Updated:  09/05/20 0608     Tissue Culture Scant growth (1+) Streptococcus intermedius     Gram Stain Few (2+) WBCs seen      No organisms seen    Narrative:       Refer to previous body fluid culture collected on 9-1-20 for MICs    Body Fluid Culture - Body Fluid, Pleural Cavity [[id number]]  (Abnormal) Collected:  09/02/20 1608    Specimen:  Body Fluid from Pleural Cavity Updated:  09/05/20 0607     Body Fluid Culture Light growth (2+) Streptococcus intermedius     Gram Stain Moderate (3+) WBCs seen      Few (2+) Gram positive cocci    Narrative:       Refer to previous body fluid culture collected on 9-1-20 for MICs    Comprehensive Metabolic Panel [[id number]]  (Abnormal) Collected:  09/05/20 0519     Specimen:  Blood Updated:  09/05/20 0600     Glucose 106 mg/dL      BUN 8 mg/dL      Creatinine 0.32 mg/dL      Sodium 134 mmol/L      Potassium 3.8 mmol/L      Chloride 95 mmol/L      CO2 27.0 mmol/L      Calcium 7.8 mg/dL      Total Protein 5.3 g/dL      Albumin 2.30 g/dL      ALT (SGPT) 38 U/L      AST (SGOT) 32 U/L      Alkaline Phosphatase 324 U/L      Total Bilirubin 0.4 mg/dL      eGFR Non African Amer >150 mL/min/1.73      Globulin 3.0 gm/dL      A/G Ratio 0.8 g/dL      BUN/Creatinine Ratio 25.0     Anion Gap 12.0 mmol/L     Narrative:       GFR Normal >60  Chronic Kidney Disease <60  Kidney Failure <15      CBC & Differential [[id number]] Collected:  09/05/20 0519    Specimen:  Blood Updated:  09/05/20 0554    Narrative:       The following orders were created for panel order CBC & Differential.  Procedure                               Abnormality         Status                     ---------                               -----------         ------                     CBC Auto Differential[[id number]]        Abnormal            Final result                 Please view results for these tests on the individual orders.    CBC Auto Differential [[id number]]  (Abnormal) Collected:  09/05/20 0519    Specimen:  Blood Updated:  09/05/20 0554     WBC 16.67 10*3/mm3      RBC 3.21 10*6/mm3      Hemoglobin 9.3 g/dL      Hematocrit 28.3 %      MCV 88.2 fL      MCH 29.0 pg      MCHC 32.9 g/dL      RDW 13.5 %      RDW-SD 43.8 fl      MPV 8.3 fL      Platelets 829 10*3/mm3      Neutrophil % 82.2 %      Lymphocyte % 7.7 %      Monocyte % 7.0 %      Eosinophil % 1.6 %      Basophil % 0.3 %      Immature Grans % 1.2 %      Neutrophils, Absolute 13.70 10*3/mm3      Lymphocytes, Absolute 1.28 10*3/mm3      Monocytes, Absolute 1.17 10*3/mm3      Eosinophils, Absolute 0.27 10*3/mm3      Basophils, Absolute 0.05 10*3/mm3      Immature Grans, Absolute 0.20 10*3/mm3      nRBC 0.0 /100 WBC     Magnesium [[id number]]  (Normal) Collected:   09/04/20 0657    Specimen:  Blood Updated:  09/04/20 0918     Magnesium 2.1 mg/dL               CXR: Small apical right pneumo, stable in size compared to prior to x-rays, improving interstitial edema.  Small right pleural effusion is stable.    Assessment/Plan     Impression:49-year-old female with complicated right lower lobe loculated pleural effusion.  This is in the setting of pneumonia.  Improving postop day  5 from VATS right pleurodesis and decortication.  Chest tubes were removed yesterday.  Her cultures have shown Streptococcus species in both the pleural fluid and pleural peel.    Plan:  Chest x-ray is stable  Would favor 2-week course of antibiotics given pleural cavity infection  Okay for DC home from my perspective today  Encouraged out of bed walking and deep breathing exercises much as possible at home  We will follow-up with our clinic in 1 week with chest x-ray and CBC    Please call with questions or concerns    Tyler Coburn M.D.  Cardiothoracic Surgeon

## 2020-09-07 NOTE — PROGRESS NOTES
Continued Stay Note   Zafar     Patient Name: Heidi Fernandez  MRN: [id number]  Today's Date: 9/7/2020    Admit Date: 8/30/2020    Discharge Plan     Row Name 09/07/20 0814       Plan    Plan Comments  Chart reviewed-- Pt is ambulating in the halls and no longer has chest tubes. Discharge soon. No needs identified at this time. SW will follow and assist with any discharge needs that may arise.         Discharge Codes    No documentation.             Marissa Jarrett

## 2020-09-07 NOTE — DISCHARGE SUMMARY
[facility] Medicine Services  DISCHARGE SUMMARY       Date of Admission: 8/30/2020  Date of Discharge:  9/7/2020  Primary Care Physician: Raegan Dubose DO    Presenting Problem/History of Present Illness:  Acute pancreatitis [K85.90]     Final Discharge Diagnoses:  Active Hospital Problems    Diagnosis   • Empyema, right (CMS/HCC)   • Leukocytosis   • Pneumonitis   • Pleural effusion on right   • Elevated liver enzymes   • Acute pancreatitis     1.  Empyema  2.  Pneumonitis  3.  Pancreatitis  4.  HTN  5.  Hypokalemia  6.  Anemia  7.  Depression  8.  Hypothyroidism    Consults:   1.  CT Surgery  2.  GI    Procedures Performed:   1. Bronchoscopy with Bronchial Alveolar Lavage  2. Right Video Assisted Thoracoscopic Total Pulmonary Decortication with Intrapleural Pneumolysis    Pertinent Test Results:   Pleural fluid culture:  Strep intermedius      Chief Complaint on Day of Discharge: feeling better    History of Present Illness on Day of Discharge:   Doing well, afebrile, no SOA.  Wants to go home    Hospital Course:  The patient is a 49 y.o. female who presented to [facility] with cough and abdominal pain.      She was found to have pancreatitis.  GI was consulted.  She was made NPO and treated with IV fluids and pain control.  She improved over the coming days from that standpoint.  GI recommends she have an outpatient EUS and they are arranging this per their notes.      She was found to have an Empyema.  She was started on IV antibiotics.  CT surgery was consulted.  She underwent bronchoscopy with BAL and decortication.  Fluid was sent for culture and grew Strep Intermedius.  She will continue on a culture specific oral antibiotic regimen for 2 weeks.     She is comfortable with being discharged and with the discharge plan.  She was given the chance to ask questions and all questions were answered to her satisfaction.        Condition on Discharge:   "Stable    Physical Exam on Discharge:  /45   Pulse 98   Temp 97.5 °F (36.4 °C) (Oral)   Resp 16   Ht 170.2 cm (67\")   Wt 67.6 kg (149 lb)   LMP 08/31/2012   SpO2 98%   BMI 23.34 kg/m²   Physical Exam   Constitutional: She is oriented to person, place, and time. She appears well-developed and well-nourished.   HENT:   Head: Normocephalic and atraumatic.   Right Ear: External ear normal.   Left Ear: External ear normal.   Nose: Nose normal.   Mouth/Throat: Oropharynx is clear and moist.   Eyes: Conjunctivae and EOM are normal.   Neck: Normal range of motion. Neck supple.   Cardiovascular: Normal rate, regular rhythm and normal heart sounds.   Pulmonary/Chest: Effort normal and breath sounds normal.   Abdominal: Soft. Bowel sounds are normal. She exhibits no distension. There is no tenderness.   Musculoskeletal: Normal range of motion.   Neurological: She is alert and oriented to person, place, and time.   Skin: Skin is warm and dry.   Psychiatric: She has a normal mood and affect. Her speech is normal and behavior is normal. Cognition and memory are normal.         Discharge Disposition:  Home or Self Care    Discharge Medications:     Discharge Medications      New Medications      Instructions Start Date   cefdinir 300 MG capsule  Commonly known as:  OMNICEF   300 mg, Oral, 2 Times Daily      metoprolol tartrate 50 MG tablet  Commonly known as:  LOPRESSOR   50 mg, Oral, Every 12 Hours Scheduled         Changes to Medications      Instructions Start Date   clobetasol 0.05 % cream  Commonly known as:  TEMOVATE  What changed:    · when to take this  · reasons to take this   1 application, Topical, 2 Times Daily         Continue These Medications      Instructions Start Date   amitriptyline 150 MG tablet  Commonly known as:  ELAVIL   150 mg, Oral, Every Night at Bedtime      estrogens (conjugated) 0.3 MG tablet  Commonly known as:  PREMARIN   0.3 mg, Oral, Daily      Fish Oil 1200 MG capsule " delayed-release   1 tablet, Oral, Daily      levothyroxine 112 MCG tablet  Commonly known as:  SYNTHROID, LEVOTHROID   112 mcg, Oral, Daily      ARMANDO MILK OF MAGNESIA PO   2 tablets, Oral, 2 Times Daily      VITAMIN B COMPLEX PO   1 tablet, Oral, Daily         Stop These Medications    verapamil 80 MG tablet  Commonly known as:  CALAN            Discharge Diet: regular    Activity at Discharge: as tolerated    Discharge Care Plan/Instructions:   Go to ER for fever or shortness of breath    Follow-up Appointments:   No future appointments.    Test Results Pending at Discharge: N/A    Electronically signed by Yosi Orlando MD, 09/07/20, 16:32.    Time: 35 minutes

## 2020-09-07 NOTE — PLAN OF CARE
Problem: Patient Care Overview  Goal: Plan of Care Review  Outcome: Ongoing (interventions implemented as appropriate)  Flowsheets (Taken 9/7/2020 0111)  Progress: improving  Plan of Care Reviewed With: patient  Outcome Summary: Gale/teg drsg over CT site.  IID.  Scheduled toradol gave.  NO request for prn pain meds.  Up ad sher.  Tele.  Hoping to go home today.  Cont to monitor.

## 2020-09-08 ENCOUNTER — READMISSION MANAGEMENT (OUTPATIENT)
Dept: CALL CENTER | Facility: HOSPITAL | Age: 50
End: 2020-09-08

## 2020-09-08 NOTE — OUTREACH NOTE
Prep Survey      Responses   [employer] facility patient discharged from?  [place]   Is LACE score < 7 ?  No   Eligibility  Readm Mgmt   Discharge diagnosis  Empyema, right    Does the patient have one of the following disease processes/diagnoses(primary or secondary)?  Cardiothoracic surgery   Does the patient have Home health ordered?  No   Is there a DME ordered?  No   Prep survey completed?  Yes          Marybeth Jacinto RN

## 2020-09-14 ENCOUNTER — OFFICE VISIT (OUTPATIENT)
Dept: CARDIAC SURGERY | Facility: CLINIC | Age: 50
End: 2020-09-14

## 2020-09-14 ENCOUNTER — READMISSION MANAGEMENT (OUTPATIENT)
Dept: CALL CENTER | Facility: HOSPITAL | Age: 50
End: 2020-09-14

## 2020-09-14 ENCOUNTER — LAB (OUTPATIENT)
Dept: LAB | Facility: HOSPITAL | Age: 50
End: 2020-09-14

## 2020-09-14 ENCOUNTER — HOSPITAL ENCOUNTER (OUTPATIENT)
Dept: GENERAL RADIOLOGY | Facility: HOSPITAL | Age: 50
Discharge: HOME | End: 2020-09-14

## 2020-09-14 VITALS
BODY MASS INDEX: 23.04 KG/M2 | HEIGHT: 67 IN | DIASTOLIC BLOOD PRESSURE: 60 MMHG | OXYGEN SATURATION: 98 % | SYSTOLIC BLOOD PRESSURE: 98 MMHG | WEIGHT: 146.8 LBS | HEART RATE: 76 BPM

## 2020-09-14 DIAGNOSIS — J86.9 EMPYEMA, RIGHT (HCC): Primary | ICD-10-CM

## 2020-09-14 DIAGNOSIS — D75.839 THROMBOCYTOSIS: ICD-10-CM

## 2020-09-14 DIAGNOSIS — J86.9 EMPYEMA, RIGHT (HCC): ICD-10-CM

## 2020-09-14 LAB
BASOPHILS # BLD AUTO: 0.1 10*3/MM3 (ref 0–0.2)
BASOPHILS NFR BLD AUTO: 1.1 % (ref 0–1.5)
DEPRECATED RDW RBC AUTO: 45.3 FL (ref 37–54)
EOSINOPHIL # BLD AUTO: 0.37 10*3/MM3 (ref 0–0.4)
EOSINOPHIL NFR BLD AUTO: 4 % (ref 0.3–6.2)
ERYTHROCYTE [DISTWIDTH] IN BLOOD BY AUTOMATED COUNT: 13.4 % (ref 12.3–15.4)
HCT VFR BLD AUTO: 30.8 % (ref 34–46.6)
HGB BLD-MCNC: 9.7 G/DL (ref 12–15.9)
LYMPHOCYTES # BLD AUTO: 2.01 10*3/MM3 (ref 0.7–3.1)
LYMPHOCYTES NFR BLD AUTO: 21.7 % (ref 19.6–45.3)
MCH RBC QN AUTO: 29.1 PG (ref 26.6–33)
MCHC RBC AUTO-ENTMCNC: 31.5 G/DL (ref 31.5–35.7)
MCV RBC AUTO: 92.5 FL (ref 79–97)
MONOCYTES # BLD AUTO: 0.76 10*3/MM3 (ref 0.1–0.9)
MONOCYTES NFR BLD AUTO: 8.2 % (ref 5–12)
NEUTROPHILS NFR BLD AUTO: 5.95 10*3/MM3 (ref 1.7–7)
NEUTROPHILS NFR BLD AUTO: 64 % (ref 42.7–76)
PLATELET # BLD AUTO: 1099 10*3/MM3 (ref 140–450)
PMV BLD AUTO: 8 FL (ref 6–12)
RBC # BLD AUTO: 3.33 10*6/MM3 (ref 3.77–5.28)
WBC # BLD AUTO: 9.28 10*3/MM3 (ref 3.4–10.8)

## 2020-09-14 PROCEDURE — 99024 POSTOP FOLLOW-UP VISIT: CPT | Performed by: NURSE PRACTITIONER

## 2020-09-14 PROCEDURE — 36415 COLL VENOUS BLD VENIPUNCTURE: CPT

## 2020-09-14 PROCEDURE — 71046 X-RAY EXAM CHEST 2 VIEWS: CPT

## 2020-09-14 PROCEDURE — 85025 COMPLETE CBC W/AUTO DIFF WBC: CPT | Performed by: NURSE PRACTITIONER

## 2020-09-14 RX ORDER — BENZONATATE 100 MG/1
100 CAPSULE ORAL 3 TIMES DAILY PRN
COMMUNITY
Start: 2020-08-30 | End: 2020-10-05

## 2020-09-14 RX ORDER — BUSPIRONE HYDROCHLORIDE 10 MG/1
10 TABLET ORAL 2 TIMES DAILY PRN
COMMUNITY
Start: 2020-08-13 | End: 2020-10-05

## 2020-09-14 NOTE — PROGRESS NOTES
Subjective   Chief Complaint   Patient presents with   • Post-op Follow-up     Thoracoscopy on 9/2       Patient ID: Heidi Fernandez is a 49 y.o. female who is here for follow-up having had THORACOSCOPY VIDEO ASSISTED WITH RIGHT DECORTICATION; RIGHT INTERCOSTAL NERVE BLOCK, BRONCHOSCOPY  performed on 9/2/2020 by Dr. Coburn.    History of Present Illness  Post operative recovery was uneventful without any major complications. Sleep habits are great. Pain control has been great.  Pain is controlled with Advil.   No fevers/sweats/chills. No drainage from incisions.  Denies shortness of breath. She is using IS at home and is up to 1200.  She does not smoke.  Ambulating 10 minutes twice daily.  CXR and CBC done prior to today's visit.  She does complain of right shoulder and scapula pain with deep breaths.  Otherwise states she is feeling great.     The following portions of the patient's history were reviewed and updated as appropriate: allergies, current medications, past family history, past medical history, past social history, past surgical history and problem list.    Review of Systems   Constitutional: Negative for activity change, chills, diaphoresis, fatigue and fever.   HENT: Negative for trouble swallowing and voice change.    Eyes: Negative for visual disturbance.   Respiratory: Positive for cough. Negative for choking, chest tightness, shortness of breath and wheezing.    Cardiovascular: Negative for chest pain, palpitations and leg swelling.   Gastrointestinal: Negative for abdominal distention, abdominal pain, constipation, diarrhea, nausea and vomiting.   Musculoskeletal: Positive for arthralgias (Right shoulder and scapula). Negative for myalgias.   Skin: Negative for color change, pallor, rash and wound.   Allergic/Immunologic: Negative for immunocompromised state.   Neurological: Negative for dizziness, syncope, weakness and light-headedness.   Psychiatric/Behavioral: Negative for agitation, confusion and  "sleep disturbance.       Objective   Visit Vitals  BP 98/60 (BP Location: Left arm, Patient Position: Sitting, Cuff Size: Adult)   Pulse 76   Ht 170.2 cm (67\")   Wt 66.6 kg (146 lb 12.8 oz)   LMP 08/31/2012   SpO2 98%   BMI 22.99 kg/m²       Physical Exam  Vitals signs reviewed.   Constitutional:       Appearance: Normal appearance.   HENT:      Head: Normocephalic.   Eyes:      Pupils: Pupils are equal, round, and reactive to light.   Cardiovascular:      Rate and Rhythm: Normal rate and regular rhythm.      Heart sounds: No murmur.   Pulmonary:      Effort: Pulmonary effort is normal.      Breath sounds: Normal breath sounds. No wheezing or rales.   Abdominal:      General: There is no distension.      Tenderness: There is no abdominal tenderness.   Musculoskeletal:         General: No swelling or tenderness.   Skin:     General: Skin is warm and dry.      Comments: Thoracic incisions are C/D/I and healing nicely.   Neurological:      General: No focal deficit present.      Mental Status: She is alert and oriented to person, place, and time.   Psychiatric:         Mood and Affect: Mood normal.         Behavior: Behavior normal.         Thought Content: Thought content normal.         Judgment: Judgment normal.         Assessment/Plan         Heidi was seen today for post-op follow-up.    Diagnoses and all orders for this visit:    Empyema, right (CMS/HCC)  -     CT Chest Without Contrast; Future    Thrombocytosis (CMS/HCC)         Overall, Heidi Fernandez is doing well. CXR today demonstrated much improvement.  WBC improved at 9 however platelets continue to increase, 1,099 today.   I have advised her to start Aspirin 81mg daily with plans to follow up with PCP in 2 weeks.  Following post op cardiac surgery home instructions. Provided support and encouragement. All questions have been answered to the best of my ability. Patient has follow up with Dr. Coburn in a few weeks with a CT Chest to be done prior to " appointment. Patient has been instructed to contact our office with any questions or concerns should they arise prior to the next office visit.     Patient's Body mass index is 22.99 kg/m². BMI is within normal parameters. No follow-up required..     Heidi Fernandez is a non-smoker and therefore does not need tobacco cessation education/counseling.      Advance Care Planning   ACP discussion was held with the patient during this visit. Patient does not have an advance directive, declines further assistance.    Follow up appointment in 1 month with Dr. Coburn with CT chest to be done prior to appointment  Plan for follow up with PCP in 2 weeks

## 2020-09-14 NOTE — OUTREACH NOTE
CT Surgery Week 1 Survey      Responses   [facility] patient discharged from?  [facility]   Does the patient have one of the following disease processes/diagnoses(primary or secondary)?  Cardiothoracic surgery   Is there a successful TCM telephone encounter documented?  No   Week 1 attempt successful?  Yes   Call start time  1600   Revoke  Decline to participate [She states she is doing well. No problems. She has a MD apt. to see surgeon in 1 month. ]   Call end time  1601   Discharge diagnosis  Empyema, right    Is patient permission given to speak with other caregiver?  No            Delisa Campos RN

## 2020-10-05 ENCOUNTER — OFFICE VISIT (OUTPATIENT)
Dept: GASTROENTEROLOGY | Facility: CLINIC | Age: 50
End: 2020-10-05

## 2020-10-05 VITALS
HEART RATE: 103 BPM | TEMPERATURE: 97.7 F | DIASTOLIC BLOOD PRESSURE: 62 MMHG | BODY MASS INDEX: 22.58 KG/M2 | HEIGHT: 68 IN | WEIGHT: 149 LBS | SYSTOLIC BLOOD PRESSURE: 92 MMHG | OXYGEN SATURATION: 98 %

## 2020-10-05 DIAGNOSIS — Z87.19 HISTORY OF PANCREATITIS: Primary | ICD-10-CM

## 2020-10-05 PROCEDURE — 99213 OFFICE O/P EST LOW 20 MIN: CPT | Performed by: NURSE PRACTITIONER

## 2020-10-05 NOTE — PROGRESS NOTES
Chief Complaint:   Chief Complaint   Patient presents with   • Follow-up     Pt presents today for hospital follow up-was in [facility] 8/30/2020 with pancreatitis; Pt states she is feeling better          Patient ID: Heidi Fernandez is a 49 y.o. female     History of Present Illness: This is a very pleasant 49-year-old female who is here today to follow-up status post hospitalization with pancreatitis.    The patient was admitted to [facility] on 8/30/2020 for multiple diagnoses that included empyema resulting in CT surgery as well as acute pancreatitis.  The patient will need to be seen by Dr. Nader Rehman for an EUS as this was her first episode of pancreatitis.  She states she never had any pain with this in the hospital is at that time she was also having difficulty with her lungs.  She states she has been doing very well since discharge from the hospital.    The patient denies any nausea, vomiting, epigastric pain, dysphagia, pyrosis or hematemesis.  The patient denies any fever or chills.  Denies any melena or hematochezia.  Denies any unintentional weight loss or loss of appetite.    Past Medical History:   Diagnosis Date   • Anemia    • Cyst, ovary, follicular    • Hypothyroidism    • Lichen sclerosus    • Migraine        Past Surgical History:   Procedure Laterality Date   • BILATERAL SALPINGO OOPHORECTOMY     • BREAST BIOPSY Right    • BRONCHOSCOPY N/A 9/2/2020    Procedure: BRONCHOSCOPY;  Surgeon: Tyler Coburn MD;  Location: [facility];  Service: Cardiothoracic;  Laterality: N/A;   • COLONOSCOPY N/A 1/8/2020    The entire examined colon is normal on direct and retroflexion views; No specimens collected; Repeat 10 years   • DILATATION AND CURETTAGE  09/13/2012   • HYSTERECTOMY  10/29/2012    TLH w/ BSO due to bleeding issues   • LAPAROSCOPY WITH LASER     • THORACOSCOPY Right 9/2/2020    Procedure: THORACOSCOPY VIDEO ASSISTED WITH RIGHT DECORTICATION; RIGHT INTERCOSTAL NERVE BLOCK;  Surgeon: Almas  Tyler LAU MD;  Location: [facility] OR;  Service: Cardiothoracic;  Laterality: Right;         Current Outpatient Medications:   •  amitriptyline (ELAVIL) 150 MG tablet, Take 150 mg by mouth every night at bedtime., Disp: , Rfl: 0  •  B Complex Vitamins (VITAMIN B COMPLEX PO), Take 1 tablet by mouth Daily., Disp: , Rfl:   •  clobetasol (TEMOVATE) 0.05 % cream, Apply 1 application topically to the appropriate area as directed 2 (Two) Times a Day. (Patient taking differently: Apply 1 application topically to the appropriate area as directed As Needed.), Disp: 45 g, Rfl: 6  •  estrogens, conjugated, (PREMARIN) 0.3 MG tablet, Take 1 tablet by mouth Daily., Disp: 90 tablet, Rfl: 3  •  levothyroxine (SYNTHROID, LEVOTHROID) 112 MCG tablet, Take 1 tablet by mouth Daily., Disp: 30 tablet, Rfl: 0  •  Magnesium Hydroxide (ARMANDO MILK OF MAGNESIA PO), Take 2 tablets by mouth 2 (Two) Times a Day., Disp: , Rfl:   •  Omega-3 Fatty Acids (FISH OIL) 1200 MG capsule delayed-release, Take 1 tablet by mouth Daily., Disp: , Rfl:     No Known Allergies    Social History     Socioeconomic History   • Marital status:      Spouse name: Not on file   • Number of children: Not on file   • Years of education: Not on file   • Highest education level: Not on file   Tobacco Use   • Smoking status: Never Smoker   • Smokeless tobacco: Never Used   Substance and Sexual Activity   • Alcohol use: No   • Drug use: No   • Sexual activity: Yes     Partners: Male     Birth control/protection: Post-menopausal       Family History   Problem Relation Age of Onset   • Hypertension Mother    • Other Mother         cerebrovascular accident   • No Known Problems Father    • No Known Problems Sister    • No Known Problems Brother    • Breast cancer Maternal Aunt 64   • Breast cancer Cousin 45   • No Known Problems Daughter    • No Known Problems Son    • No Known Problems Maternal Grandmother    • No Known Problems Paternal Grandmother    • No Known Problems  "Paternal Aunt    • Ovarian cancer Neg Hx    • Uterine cancer Neg Hx    • Colon cancer Neg Hx    • Melanoma Neg Hx    • Prostate cancer Neg Hx    • BRCA 1/2 Neg Hx    • Endometrial cancer Neg Hx    • Colon polyps Neg Hx    • Esophageal cancer Neg Hx    • Liver cancer Neg Hx    • Liver disease Neg Hx    • Rectal cancer Neg Hx    • Stomach cancer Neg Hx        Vitals:    10/05/20 1306   BP: 92/62   BP Location: Left arm   Patient Position: Sitting   Cuff Size: Adult   Pulse: 103   Temp: 97.7 °F (36.5 °C)   TempSrc: Infrared   SpO2: 98%   Weight: 67.6 kg (149 lb)   Height: 172.7 cm (68\")       Review of Systems:    General:    Present -feeling well   Skin:    Not Present-Rash   HEENT:     Not Present-Acute visual changes or Acute hearing changes   Neck :    Not Present- swollen glands   Genitourinary:      Not Present- burning, frequency, urgency hematuria, dysuria,   Cardiovascular:   Not Present-chest pain, palpitations, or pressure   Respiratory:   Not Present- shortness of breath or cough   Gastrointestinal:  Musculoskeletal:  Neurological:  Psychiatric:   Present as mentioned in the HP    Not Present. Recent gait disturbances.    Not Present-Seizures and weakness in extremities.    Not Present- Anxiety or Depression.       Physical Exam:    General Appearance:    Alert, cooperative, in no acute distress   Psych:    Mood appropriate    Eyes:          conjunctivae and sclerae normal, no   icterus, no pallor   ENMT:    Ears appear intact with no abnormalities noted oral mucosa moist   Neck:   No adenopathy, supple, trachea midline, no thyromegaly, no   carotid bruit, no JVD    Cardiovascular:    Regular rhythm and normal rate, normal S1 and S2, no            murmur, no gallop, no rub, no click   Gastrointestinal:     Inspection normal.  Normal bowel sounds, no masses, no organomegaly, soft round non-tender, non-distended, no guarding, no rebound or tenderness. No hepatosplenomegaly.   Skin:   No bleeding, bruising or " rash   Neurologic:   nonfocal       Lab Results - Last 18 Months   Lab Units 09/07/20  0514 09/06/20  0511 09/05/20  0519 09/04/20  0657 09/03/20  0301 09/02/20  1947 09/02/20  0615 09/01/20  0241   GLUCOSE mg/dL 107* 122* 106* 117* 153* 150* 102* 95   BUN mg/dL 7 8 8 11 7 6 5* 11   CREATININE mg/dL 0.33* 0.35* 0.32* 0.29* 0.23* 0.39* 0.29* 0.43*   SODIUM mmol/L 134* 134* 134* 135* 138 136 134* 134*   POTASSIUM mmol/L 4.4 4.2 3.8 3.4* 3.6 3.6 3.5 3.6   CHLORIDE mmol/L 98 98 95* 94* 98 95* 97* 97*   CO2 mmol/L 27.0 25.0 27.0 31.0* 26.0 24.0 25.0 25.0   TOTAL PROTEIN g/dL 5.9* 5.7* 5.3* 5.3* 5.2*  --  5.4* 5.5*   ALBUMIN g/dL 2.70* 2.30* 2.30* 2.30* 2.30*  --  2.00* 2.40*   ALT (SGPT) U/L 68* 50* 38* 43* 43*  --  48* 70*   AST (SGOT) U/L 63* 45* 32 38* 40*  --  39* 37*   ALK PHOS U/L 324* 325* 324* 359* 456*  --  467* 392*   BILIRUBIN mg/dL 0.2 0.2 0.4 0.4 0.5  --  0.5 0.5   GLOBULIN gm/dL 3.2 3.4 3.0 3.0 2.9  --  3.4 3.1   CRP mg/dL  --   --   --   --   --   --  33.85* 39.21*       Lab Results - Last 18 Months   Lab Units 09/14/20  1031 09/07/20  0514 09/06/20  0511 09/05/20  0519 09/04/20  0657 09/03/20  0301  09/02/20  0930   HEMOGLOBIN g/dL 9.7* 8.8* 9.1* 9.3* 9.8* 9.1*   < >  --    HEMATOCRIT % 30.8* 27.8* 27.1* 28.3* 28.6* 27.0*   < >  --    MCV fL 92.5 90.3 88.9 88.2 87.5 88.2   < >  --    WBC 10*3/mm3 9.28 12.73* 16.32* 16.67* 17.83* 19.50*   < >  --    RDW % 13.4 13.8 13.8 13.5 13.6 13.8   < >  --    MPV fL 8.0 8.3 8.5 8.3 8.1 8.3   < >  --    PLATELETS 10*3/mm3 1,099* 987* 859* 829* 765* 642*   < >  --    INR   --   --   --   --   --   --   --  1.22*    < > = values in this interval not displayed.       Lab Results - Last 18 Months   Lab Units 09/01/20 0241 12/06/19  0942   TSH uIU/mL 0.063* 0.636   VIT D 25 HYDROXY ng/ml  --  58.8        Lab Results - Last 18 Months   Lab Units 09/01/20 0241   HEP B C IGM  Non-Reactive   HEP B S AG  Non-Reactive       Body mass index is 22.66 kg/m². Patient's Body mass  index is 22.66 kg/m². BMI is within normal parameters. No follow-up required..    Assessment and Plan:  Assessment/Plan   Heidi was seen today for follow-up.    Diagnoses and all orders for this visit:    History of pancreatitis  -     Ambulatory Referral to Gastroenterology      Will refer the patient to Dr. Nader Rehman for EUS which is not available at this hospital .  This was the patient's first episode of pancreatitis.     Patient Instructions   Acute Pancreatitis    Acute pancreatitis happens when the pancreas gets swollen. The pancreas is a large gland in the body that helps to control blood sugar. It also makes enzymes that help to digest food.  This condition can last a few days and cause serious problems. The lungs, heart, and kidneys may stop working.  What are the causes?  Causes include:  · Alcohol abuse.  · Drug abuse.  · Gallstones.  · A tumor in the pancreas.  Other causes include:  · Some medicines.  · Some chemicals.  · Diabetes.  · An infection.  · Damage caused by an accident.  · The poison (venom) from a scorpion bite.  · Belly (abdominal) surgery.  · The body's defense system (immune system) attacking the pancreas (autoimmune pancreatitis).  · Genes that are passed from parent to child (inherited).  In some cases, the cause is not known.  What are the signs or symptoms?  · Pain in the upper belly that may be felt in the back. The pain may be very bad.  · Swelling of the belly.  · Feeling sick to your stomach (nauseous) and throwing up (vomiting).  · Fever.  How is this treated?  You will likely have to stay in the hospital. Treatment may include:  · Pain medicine.  · Fluid through an IV tube.  · Placing a tube in the stomach to take out the stomach contents. This may help you stop throwing up.  · Not eating for 3-4 days.  · Antibiotic medicines, if you have an infection.  · Treating any other problems that may be the cause.  · Steroid medicines, if your problem is caused by your defense system  attacking your body's own tissues.  · Surgery.  Follow these instructions at home:  Eating and drinking    · Follow instructions from your doctor about what to eat and drink.  · Eat foods that do not have a lot of fat in them.  · Eat small meals often. Do not eat big meals.  · Drink enough fluid to keep your pee (urine) pale yellow.  · Do not drink alcohol if it caused your condition.  Medicines  · Take over-the-counter and prescription medicines only as told by your doctor.  · Ask your doctor if the medicine prescribed to you:  ? Requires you to avoid driving or using heavy machinery.  ? Can cause trouble pooping (constipation). You may need to take steps to prevent or treat trouble pooping:  § Take over-the-counter or prescription medicines.  § Eat foods that are high in fiber. These include beans, whole grains, and fresh fruits and vegetables.  § Limit foods that are high in fat and sugar. These include fried or sweet foods.  General instructions  · Do not use any products that contain nicotine or tobacco, such as cigarettes, e-cigarettes, and chewing tobacco. If you need help quitting, ask your doctor.  · Get plenty of rest.  · Check your blood sugar at home as told by your doctor.  · Keep all follow-up visits as told by your doctor. This is important.  Contact a doctor if:  · You do not get better as quickly as expected.  · You have new symptoms.  · Your symptoms get worse.  · You have pain or weakness that lasts a long time.  · You keep feeling sick to your stomach.  · You get better and then you have pain again.  · You have a fever.  Get help right away if:  · You cannot eat or keep fluids down.  · Your pain gets very bad.  · Your skin or the white part of your eyes turns yellow.  · You have sudden swelling in your belly.  · You throw up.  · You feel dizzy or you pass out (faint).  · Your blood sugar is high (over 300 mg/dL).  Summary  · Acute pancreatitis happens when the pancreas gets swollen.  · This  condition is often caused by alcohol abuse, drug abuse, or gallstones.  · You will likely have to stay in the hospital for treatment.  This information is not intended to replace advice given to you by your health care provider. Make sure you discuss any questions you have with your health care provider.  Document Released: 06/05/2009 Document Revised: 10/07/2019 Document Reviewed: 10/07/2019  [employer]vier Patient Education © 2020 [employer]    Pancreatitis Eating Plan  Pancreatitis is when your pancreas becomes irritated and swollen (inflamed). The pancreas is a small organ located behind your stomach. It helps your body digest food and regulate your blood sugar. Pancreatitis can affect how your body digests food, especially foods with fat. You may also have other symptoms such as abdominal pain or nausea.  When you have pancreatitis, following a low-fat eating plan may help you manage symptoms and recover more quickly. Work with your health care provider or a diet and nutrition specialist (dietitian) to create an eating plan that is right for you.  What are tips for following this plan?  Reading food labels  Use the information on food labels to help keep track of how much fat you eat:  · Check the serving size.  · Look for the amount of total fat in grams (g) in one serving.  ? Low-fat foods have 3 g of fat or less per serving.  ? Fat-free foods have 0.5 g of fat or less per serving.  · Keep track of how much fat you eat based on how many servings you eat.  ? For example, if you eat two servings, the amount of fat you eat will be two times what is listed on the label.  Shopping    · Buy low-fat or nonfat foods, such as:  ? Fresh, frozen, or canned fruits and vegetables.  ? Grains, including pasta, bread, and rice.  ? Lean meat, poultry, fish, and other protein foods.  ? Low-fat or nonfat dairy.  · Avoid buying bakery products and other sweets made with whole milk, butter, and eggs.  · Avoid buying snack foods with  added fat, such as anything with butter or cheese flavoring.  Cooking  · Remove skin from poultry, and remove extra fat from meat.  · Limit the amount of fat and oil you use to 6 teaspoons or less per day.  · Cook using low-fat methods, such as boiling, broiling, grilling, steaming, or baking.  · Use spray oil to cook. Add fat-free chicken broth to add flavor and moisture.  · Avoid adding cream to thicken soups or sauces. Use other thickeners such as corn starch or tomato paste.  Meal planning    · Eat a low-fat diet as told by your dietitian. For most people, this means having no more than 55-65 grams of fat each day.  · Eat small, frequent meals throughout the day. For example, you may have 5-6 small meals instead of 3 large meals.  · Drink enough fluid to keep your urine pale yellow.  · Do not drink alcohol. Talk to your health care provider if you need help stopping.  · Limit how much caffeine you have, including black coffee, black and green tea, caffeinated soft drinks, and energy drinks.  General information  · Let your health care provider or dietitian know if you have unplanned weight loss on this eating plan.  · You may be instructed to follow a clear liquid diet during a flare of symptoms. Talk with your health care provider about how to manage your diet during symptoms of a flare.  · Take any vitamins or supplements as told by your health care provider.  · Work with a dietitian, especially if you have other conditions such as obesity or diabetes mellitus.  What foods should I avoid?  Fruits  Fried fruits. Fruits served with butter or cream.  Vegetables  Fried vegetables. Vegetables cooked with butter, cheese, or cream.  Grains  Biscuits, waffles, donuts, pastries, and croissants. Pies and cookies. Butter-flavored popcorn. Regular crackers.  Meats and other protein foods  Fatty cuts of meat. Poultry with skin. Organ meats. Rosa, sausage, and cold cuts. Whole eggs. Nuts and nut butters.  Dairy  Whole and  2% milk. Whole milk yogurt. Whole milk ice cream. Cream and half-and-half. Cream cheese. Sour cream. Cheese.  Beverages  Wine, beer, and liquor.  The items listed above may not be a complete list of foods and beverages to avoid. Contact a dietitian for more information.  Summary  · Pancreatitis can affect how your body digests food, especially foods with fat.  · When you have pancreatitis, it is recommended that you follow a low-fat eating plan to help you recover more quickly and manage symptoms. For most people, this means limiting fat to no more than 55-65 grams per day.  · Do not drink alcohol. Limit the amount of caffeine you have, and drink enough fluid to keep your urine pale yellow.  This information is not intended to replace advice given to you by your health care provider. Make sure you discuss any questions you have with your health care provider.  Document Released: 03/26/2019 Document Revised: 04/09/2020 Document Reviewed: 03/26/2019  [employer] Patient Education © 2020 [employer]    Pancreatitis Eating Plan  Pancreatitis is when your pancreas becomes irritated and swollen (inflamed). The pancreas is a small organ located behind your stomach. It helps your body digest food and regulate your blood sugar. Pancreatitis can affect how your body digests food, especially foods with fat. You may also have other symptoms such as abdominal pain or nausea.  When you have pancreatitis, following a low-fat eating plan may help you manage symptoms and recover more quickly. Work with your health care provider or a diet and nutrition specialist (dietitian) to create an eating plan that is right for you.  What are tips for following this plan?  Reading food labels  Use the information on food labels to help keep track of how much fat you eat:  · Check the serving size.  · Look for the amount of total fat in grams (g) in one serving.  ? Low-fat foods have 3 g of fat or less per serving.  ? Fat-free foods have 0.5 g of  fat or less per serving.  · Keep track of how much fat you eat based on how many servings you eat.  ? For example, if you eat two servings, the amount of fat you eat will be two times what is listed on the label.  Shopping    · Buy low-fat or nonfat foods, such as:  ? Fresh, frozen, or canned fruits and vegetables.  ? Grains, including pasta, bread, and rice.  ? Lean meat, poultry, fish, and other protein foods.  ? Low-fat or nonfat dairy.  · Avoid buying bakery products and other sweets made with whole milk, butter, and eggs.  · Avoid buying snack foods with added fat, such as anything with butter or cheese flavoring.  Cooking  · Remove skin from poultry, and remove extra fat from meat.  · Limit the amount of fat and oil you use to 6 teaspoons or less per day.  · Cook using low-fat methods, such as boiling, broiling, grilling, steaming, or baking.  · Use spray oil to cook. Add fat-free chicken broth to add flavor and moisture.  · Avoid adding cream to thicken soups or sauces. Use other thickeners such as corn starch or tomato paste.  Meal planning    · Eat a low-fat diet as told by your dietitian. For most people, this means having no more than 55-65 grams of fat each day.  · Eat small, frequent meals throughout the day. For example, you may have 5-6 small meals instead of 3 large meals.  · Drink enough fluid to keep your urine pale yellow.  · Do not drink alcohol. Talk to your health care provider if you need help stopping.  · Limit how much caffeine you have, including black coffee, black and green tea, caffeinated soft drinks, and energy drinks.  General information  · Let your health care provider or dietitian know if you have unplanned weight loss on this eating plan.  · You may be instructed to follow a clear liquid diet during a flare of symptoms. Talk with your health care provider about how to manage your diet during symptoms of a flare.  · Take any vitamins or supplements as told by your health care  provider.  · Work with a dietitian, especially if you have other conditions such as obesity or diabetes mellitus.  What foods should I avoid?  Fruits  Fried fruits. Fruits served with butter or cream.  Vegetables  Fried vegetables. Vegetables cooked with butter, cheese, or cream.  Grains  Biscuits, waffles, donuts, pastries, and croissants. Pies and cookies. Butter-flavored popcorn. Regular crackers.  Meats and other protein foods  Fatty cuts of meat. Poultry with skin. Organ meats. Rosa, sausage, and cold cuts. Whole eggs. Nuts and nut butters.  Dairy  Whole and 2% milk. Whole milk yogurt. Whole milk ice cream. Cream and half-and-half. Cream cheese. Sour cream. Cheese.  Beverages  Wine, beer, and liquor.  The items listed above may not be a complete list of foods and beverages to avoid. Contact a dietitian for more information.  Summary  · Pancreatitis can affect how your body digests food, especially foods with fat.  · When you have pancreatitis, it is recommended that you follow a low-fat eating plan to help you recover more quickly and manage symptoms. For most people, this means limiting fat to no more than 55-65 grams per day.  · Do not drink alcohol. Limit the amount of caffeine you have, and drink enough fluid to keep your urine pale yellow.  This information is not intended to replace advice given to you by your health care provider. Make sure you discuss any questions you have with your health care provider.  Document Released: 03/26/2019 Document Revised: 04/09/2020 Document Reviewed: 03/26/2019  [employer] Patient Education © 2020 [employer]        Next follow-up appointment        EMR Dragon/Transcription disclaimer:  Much of this encounter note is an electronic transcription/translation of spoken language to printed text. The electronic translation of spoken language may permit erroneous, or at times, nonsensical words or phrases to be inadvertently transcribed; although I have reviewed the note for  such errors, some may still exist.

## 2020-10-05 NOTE — PATIENT INSTRUCTIONS
Acute Pancreatitis    Acute pancreatitis happens when the pancreas gets swollen. The pancreas is a large gland in the body that helps to control blood sugar. It also makes enzymes that help to digest food.  This condition can last a few days and cause serious problems. The lungs, heart, and kidneys may stop working.  What are the causes?  Causes include:  · Alcohol abuse.  · Drug abuse.  · Gallstones.  · A tumor in the pancreas.  Other causes include:  · Some medicines.  · Some chemicals.  · Diabetes.  · An infection.  · Damage caused by an accident.  · The poison (venom) from a scorpion bite.  · Belly (abdominal) surgery.  · The body's defense system (immune system) attacking the pancreas (autoimmune pancreatitis).  · Genes that are passed from parent to child (inherited).  In some cases, the cause is not known.  What are the signs or symptoms?  · Pain in the upper belly that may be felt in the back. The pain may be very bad.  · Swelling of the belly.  · Feeling sick to your stomach (nauseous) and throwing up (vomiting).  · Fever.  How is this treated?  You will likely have to stay in the hospital. Treatment may include:  · Pain medicine.  · Fluid through an IV tube.  · Placing a tube in the stomach to take out the stomach contents. This may help you stop throwing up.  · Not eating for 3-4 days.  · Antibiotic medicines, if you have an infection.  · Treating any other problems that may be the cause.  · Steroid medicines, if your problem is caused by your defense system attacking your body's own tissues.  · Surgery.  Follow these instructions at home:  Eating and drinking    · Follow instructions from your doctor about what to eat and drink.  · Eat foods that do not have a lot of fat in them.  · Eat small meals often. Do not eat big meals.  · Drink enough fluid to keep your pee (urine) pale yellow.  · Do not drink alcohol if it caused your condition.  Medicines  · Take over-the-counter and prescription medicines only  as told by your doctor.  · Ask your doctor if the medicine prescribed to you:  ? Requires you to avoid driving or using heavy machinery.  ? Can cause trouble pooping (constipation). You may need to take steps to prevent or treat trouble pooping:  § Take over-the-counter or prescription medicines.  § Eat foods that are high in fiber. These include beans, whole grains, and fresh fruits and vegetables.  § Limit foods that are high in fat and sugar. These include fried or sweet foods.  General instructions  · Do not use any products that contain nicotine or tobacco, such as cigarettes, e-cigarettes, and chewing tobacco. If you need help quitting, ask your doctor.  · Get plenty of rest.  · Check your blood sugar at home as told by your doctor.  · Keep all follow-up visits as told by your doctor. This is important.  Contact a doctor if:  · You do not get better as quickly as expected.  · You have new symptoms.  · Your symptoms get worse.  · You have pain or weakness that lasts a long time.  · You keep feeling sick to your stomach.  · You get better and then you have pain again.  · You have a fever.  Get help right away if:  · You cannot eat or keep fluids down.  · Your pain gets very bad.  · Your skin or the white part of your eyes turns yellow.  · You have sudden swelling in your belly.  · You throw up.  · You feel dizzy or you pass out (faint).  · Your blood sugar is high (over 300 mg/dL).  Summary  · Acute pancreatitis happens when the pancreas gets swollen.  · This condition is often caused by alcohol abuse, drug abuse, or gallstones.  · You will likely have to stay in the hospital for treatment.  This information is not intended to replace advice given to you by your health care provider. Make sure you discuss any questions you have with your health care provider.  Document Released: 06/05/2009 Document Revised: 10/07/2019 Document Reviewed: 10/07/2019  Elsevier Patient Education © 2020 [employer]    Pancreatitis  Eating Plan  Pancreatitis is when your pancreas becomes irritated and swollen (inflamed). The pancreas is a small organ located behind your stomach. It helps your body digest food and regulate your blood sugar. Pancreatitis can affect how your body digests food, especially foods with fat. You may also have other symptoms such as abdominal pain or nausea.  When you have pancreatitis, following a low-fat eating plan may help you manage symptoms and recover more quickly. Work with your health care provider or a diet and nutrition specialist (dietitian) to create an eating plan that is right for you.  What are tips for following this plan?  Reading food labels  Use the information on food labels to help keep track of how much fat you eat:  · Check the serving size.  · Look for the amount of total fat in grams (g) in one serving.  ? Low-fat foods have 3 g of fat or less per serving.  ? Fat-free foods have 0.5 g of fat or less per serving.  · Keep track of how much fat you eat based on how many servings you eat.  ? For example, if you eat two servings, the amount of fat you eat will be two times what is listed on the label.  Shopping    · Buy low-fat or nonfat foods, such as:  ? Fresh, frozen, or canned fruits and vegetables.  ? Grains, including pasta, bread, and rice.  ? Lean meat, poultry, fish, and other protein foods.  ? Low-fat or nonfat dairy.  · Avoid buying bakery products and other sweets made with whole milk, butter, and eggs.  · Avoid buying snack foods with added fat, such as anything with butter or cheese flavoring.  Cooking  · Remove skin from poultry, and remove extra fat from meat.  · Limit the amount of fat and oil you use to 6 teaspoons or less per day.  · Cook using low-fat methods, such as boiling, broiling, grilling, steaming, or baking.  · Use spray oil to cook. Add fat-free chicken broth to add flavor and moisture.  · Avoid adding cream to thicken soups or sauces. Use other thickeners such as corn  starch or tomato paste.  Meal planning    · Eat a low-fat diet as told by your dietitian. For most people, this means having no more than 55-65 grams of fat each day.  · Eat small, frequent meals throughout the day. For example, you may have 5-6 small meals instead of 3 large meals.  · Drink enough fluid to keep your urine pale yellow.  · Do not drink alcohol. Talk to your health care provider if you need help stopping.  · Limit how much caffeine you have, including black coffee, black and green tea, caffeinated soft drinks, and energy drinks.  General information  · Let your health care provider or dietitian know if you have unplanned weight loss on this eating plan.  · You may be instructed to follow a clear liquid diet during a flare of symptoms. Talk with your health care provider about how to manage your diet during symptoms of a flare.  · Take any vitamins or supplements as told by your health care provider.  · Work with a dietitian, especially if you have other conditions such as obesity or diabetes mellitus.  What foods should I avoid?  Fruits  Fried fruits. Fruits served with butter or cream.  Vegetables  Fried vegetables. Vegetables cooked with butter, cheese, or cream.  Grains  Biscuits, waffles, donuts, pastries, and croissants. Pies and cookies. Butter-flavored popcorn. Regular crackers.  Meats and other protein foods  Fatty cuts of meat. Poultry with skin. Organ meats. Rosa, sausage, and cold cuts. Whole eggs. Nuts and nut butters.  Dairy  Whole and 2% milk. Whole milk yogurt. Whole milk ice cream. Cream and half-and-half. Cream cheese. Sour cream. Cheese.  Beverages  Wine, beer, and liquor.  The items listed above may not be a complete list of foods and beverages to avoid. Contact a dietitian for more information.  Summary  · Pancreatitis can affect how your body digests food, especially foods with fat.  · When you have pancreatitis, it is recommended that you follow a low-fat eating plan to help you  recover more quickly and manage symptoms. For most people, this means limiting fat to no more than 55-65 grams per day.  · Do not drink alcohol. Limit the amount of caffeine you have, and drink enough fluid to keep your urine pale yellow.  This information is not intended to replace advice given to you by your health care provider. Make sure you discuss any questions you have with your health care provider.  Document Released: 03/26/2019 Document Revised: 04/09/2020 Document Reviewed: 03/26/2019  Elsevier Patient Education © 2020 [employer]    Pancreatitis Eating Plan  Pancreatitis is when your pancreas becomes irritated and swollen (inflamed). The pancreas is a small organ located behind your stomach. It helps your body digest food and regulate your blood sugar. Pancreatitis can affect how your body digests food, especially foods with fat. You may also have other symptoms such as abdominal pain or nausea.  When you have pancreatitis, following a low-fat eating plan may help you manage symptoms and recover more quickly. Work with your health care provider or a diet and nutrition specialist (dietitian) to create an eating plan that is right for you.  What are tips for following this plan?  Reading food labels  Use the information on food labels to help keep track of how much fat you eat:  · Check the serving size.  · Look for the amount of total fat in grams (g) in one serving.  ? Low-fat foods have 3 g of fat or less per serving.  ? Fat-free foods have 0.5 g of fat or less per serving.  · Keep track of how much fat you eat based on how many servings you eat.  ? For example, if you eat two servings, the amount of fat you eat will be two times what is listed on the label.  Shopping    · Buy low-fat or nonfat foods, such as:  ? Fresh, frozen, or canned fruits and vegetables.  ? Grains, including pasta, bread, and rice.  ? Lean meat, poultry, fish, and other protein foods.  ? Low-fat or nonfat dairy.  · Avoid buying  bakery products and other sweets made with whole milk, butter, and eggs.  · Avoid buying snack foods with added fat, such as anything with butter or cheese flavoring.  Cooking  · Remove skin from poultry, and remove extra fat from meat.  · Limit the amount of fat and oil you use to 6 teaspoons or less per day.  · Cook using low-fat methods, such as boiling, broiling, grilling, steaming, or baking.  · Use spray oil to cook. Add fat-free chicken broth to add flavor and moisture.  · Avoid adding cream to thicken soups or sauces. Use other thickeners such as corn starch or tomato paste.  Meal planning    · Eat a low-fat diet as told by your dietitian. For most people, this means having no more than 55-65 grams of fat each day.  · Eat small, frequent meals throughout the day. For example, you may have 5-6 small meals instead of 3 large meals.  · Drink enough fluid to keep your urine pale yellow.  · Do not drink alcohol. Talk to your health care provider if you need help stopping.  · Limit how much caffeine you have, including black coffee, black and green tea, caffeinated soft drinks, and energy drinks.  General information  · Let your health care provider or dietitian know if you have unplanned weight loss on this eating plan.  · You may be instructed to follow a clear liquid diet during a flare of symptoms. Talk with your health care provider about how to manage your diet during symptoms of a flare.  · Take any vitamins or supplements as told by your health care provider.  · Work with a dietitian, especially if you have other conditions such as obesity or diabetes mellitus.  What foods should I avoid?  Fruits  Fried fruits. Fruits served with butter or cream.  Vegetables  Fried vegetables. Vegetables cooked with butter, cheese, or cream.  Grains  Biscuits, waffles, donuts, pastries, and croissants. Pies and cookies. Butter-flavored popcorn. Regular crackers.  Meats and other protein foods  Fatty cuts of meat. Poultry  with skin. Organ meats. Rosa, sausage, and cold cuts. Whole eggs. Nuts and nut butters.  Dairy  Whole and 2% milk. Whole milk yogurt. Whole milk ice cream. Cream and half-and-half. Cream cheese. Sour cream. Cheese.  Beverages  Wine, beer, and liquor.  The items listed above may not be a complete list of foods and beverages to avoid. Contact a dietitian for more information.  Summary  · Pancreatitis can affect how your body digests food, especially foods with fat.  · When you have pancreatitis, it is recommended that you follow a low-fat eating plan to help you recover more quickly and manage symptoms. For most people, this means limiting fat to no more than 55-65 grams per day.  · Do not drink alcohol. Limit the amount of caffeine you have, and drink enough fluid to keep your urine pale yellow.  This information is not intended to replace advice given to you by your health care provider. Make sure you discuss any questions you have with your health care provider.  Document Released: 03/26/2019 Document Revised: 04/09/2020 Document Reviewed: 03/26/2019  Elsevier Patient Education © 2020 [employer]

## 2020-10-12 ENCOUNTER — OFFICE VISIT (OUTPATIENT)
Dept: CARDIAC SURGERY | Facility: CLINIC | Age: 50
End: 2020-10-12

## 2020-10-12 ENCOUNTER — HOSPITAL ENCOUNTER (OUTPATIENT)
Dept: CT IMAGING | Facility: HOSPITAL | Age: 50
Discharge: HOME | End: 2020-10-12
Admitting: NURSE PRACTITIONER

## 2020-10-12 VITALS
SYSTOLIC BLOOD PRESSURE: 118 MMHG | DIASTOLIC BLOOD PRESSURE: 68 MMHG | HEART RATE: 97 BPM | BODY MASS INDEX: 24.1 KG/M2 | OXYGEN SATURATION: 99 % | WEIGHT: 159 LBS | HEIGHT: 68 IN

## 2020-10-12 DIAGNOSIS — J86.9 EMPYEMA, RIGHT (HCC): Primary | ICD-10-CM

## 2020-10-12 DIAGNOSIS — J86.9 EMPYEMA, RIGHT (HCC): ICD-10-CM

## 2020-10-12 PROCEDURE — 99024 POSTOP FOLLOW-UP VISIT: CPT | Performed by: SURGERY

## 2020-10-12 PROCEDURE — 71250 CT THORAX DX C-: CPT

## 2020-10-12 RX ORDER — HYDROXYZINE HYDROCHLORIDE 25 MG/1
25 TABLET, FILM COATED ORAL 3 TIMES DAILY PRN
COMMUNITY
End: 2021-09-07

## 2020-10-12 RX ORDER — TRIAMCINOLONE ACETONIDE 1 MG/G
CREAM TOPICAL 2 TIMES DAILY
COMMUNITY

## 2020-10-13 NOTE — PROGRESS NOTES
"    [facility] Cardiothoracic Surgery  PROGRESS NOTE     Subjective:  Ms. Fernandez is a 49-year-old female who is 1 month postop from a decortication for empyema.  She returns today with a follow-up CT scan.  She is asymptomatic denies cough, fevers or chills.  Her breathing is back to normal and she is able to do any activities she wants.  She occasionally will have a twinge of pain if she turns the wrong way near her incisions but overall this is not affecting her.    ROS: Denies fevers chills night sweats unexpected weight loss    Objective:      /68 (BP Location: Left arm, Patient Position: Sitting, Cuff Size: Adult)   Pulse 97   Ht 172.7 cm (68\")   Wt 72.1 kg (159 lb)   LMP 08/31/2012   SpO2 99%   BMI 24.18 kg/m²         PE:  Vitals:    10/12/20 1403   BP: 118/68   Pulse: 97   SpO2: 99%     GENERAL: NAD, resting comfortably, normal color  CARDIOVASCULAR: regular, regular rate, sinus  PULMONARY: Normal bilateral breath sounds, no labored breathing, well-healed incisions  ABDOMEN: soft, nt/nd  EXTREMITIES: mild peripheral edema, normal pulses, normal ROM        Lab Results (last 72 hours)     ** No results found for the last 72 hours. **              CT Chest: Some scarring vs linear atelectasis of RML along major fissure.  No masses or nodules appreciated    Assessment/Plan     Ms. Fernandez is a 49-year-old female with a plicated pneumonia with parapneumonic effusion and empyema.  She is 1 month out from a right VATS pleurodesis decortication.  She is recovered very well from this and functionally is back to baseline.  There is no evidence of abnormality on her CT scan besides some likely scarring versus atelectasis at previous site of pneumonia.  She has no restrictions on activity and can follow-up with CT surgery as needed.    Thank you for trusting us with the care of Ms. Fernandez.  Please call with any questions or concerns.    Tyler Coburn M.D.  Cardiothoracic Surgeon    "

## 2020-10-14 LAB
FUNGUS WND CULT: NORMAL

## 2020-11-02 RX ORDER — VITAMIN B COMPLEX
1 CAPSULE ORAL DAILY
COMMUNITY

## 2020-11-02 RX ORDER — CLOBETASOL PROPIONATE 0.5 MG/G
CREAM TOPICAL 2 TIMES DAILY
COMMUNITY

## 2020-11-02 RX ORDER — AMITRIPTYLINE HYDROCHLORIDE 150 MG/1
150 TABLET, FILM COATED ORAL NIGHTLY
COMMUNITY

## 2020-11-02 RX ORDER — LEVOTHYROXINE SODIUM 112 UG/1
112 TABLET ORAL DAILY
COMMUNITY

## 2020-11-02 RX ORDER — CALCIUM CARBONATE/VITAMIN D3 600 MG-10
1 TABLET ORAL
COMMUNITY

## 2021-01-04 DIAGNOSIS — N95.1 MENOPAUSAL SYMPTOMS: ICD-10-CM

## 2021-01-28 ENCOUNTER — OFFICE VISIT (OUTPATIENT)
Dept: OBSTETRICS AND GYNECOLOGY | Facility: CLINIC | Age: 51
End: 2021-01-28

## 2021-01-28 VITALS
BODY MASS INDEX: 22.73 KG/M2 | WEIGHT: 150 LBS | SYSTOLIC BLOOD PRESSURE: 132 MMHG | HEIGHT: 68 IN | DIASTOLIC BLOOD PRESSURE: 80 MMHG

## 2021-01-28 DIAGNOSIS — N95.1 MENOPAUSAL STATE: ICD-10-CM

## 2021-01-28 DIAGNOSIS — N95.1 MENOPAUSAL SYMPTOMS: ICD-10-CM

## 2021-01-28 DIAGNOSIS — Z12.31 ENCOUNTER FOR SCREENING MAMMOGRAM FOR MALIGNANT NEOPLASM OF BREAST: ICD-10-CM

## 2021-01-28 DIAGNOSIS — Z01.419 WELL WOMAN EXAM WITH ROUTINE GYNECOLOGICAL EXAM: Primary | ICD-10-CM

## 2021-01-28 PROCEDURE — 99396 PREV VISIT EST AGE 40-64: CPT | Performed by: NURSE PRACTITIONER

## 2021-01-28 RX ORDER — METOPROLOL SUCCINATE 25 MG/1
25 TABLET, EXTENDED RELEASE ORAL DAILY
COMMUNITY
Start: 2021-01-06 | End: 2021-12-13 | Stop reason: SDUPTHER

## 2021-01-28 NOTE — PROGRESS NOTES
Subjective   Heidi Fernandez is a 50 y.o. female  YOB: 1970        Chief Complaint   Patient presents with   • Gynecologic Exam     Patient here for yearly exam. Last exam was done 12/06/19. Patient has had a TLH w/ BSO due to bleeding issues. Last mammo done 12/17/19 at [facility] and was normal, last Dexa 10/31/18. Patient needs order for mammo and dexa. Patient is on Premarin 0.3mg tablets and doing well with them. Patient needs refills sent to her pharmacy.        Gynecologic Exam  The patient's pertinent negatives include no pelvic pain. Pertinent negatives include no abdominal pain, back pain, constipation, diarrhea, dysuria, fever, frequency, hematuria, nausea, rash, sore throat, urgency or vomiting.       The following portions of the patient's history were reviewed and updated as appropriate: allergies, current medications, past family history, past medical history, past social history, past surgical history and problem list.    No Known Allergies    Past Medical History:   Diagnosis Date   • Anemia    • Cyst, ovary, follicular    • Hypothyroidism    • Lichen sclerosus    • Migraine        Family History   Problem Relation Age of Onset   • Hypertension Mother    • Other Mother         cerebrovascular accident   • No Known Problems Father    • No Known Problems Sister    • No Known Problems Brother    • Breast cancer Maternal Aunt 64   • Breast cancer Cousin 45   • No Known Problems Daughter    • No Known Problems Son    • No Known Problems Maternal Grandmother    • No Known Problems Paternal Grandmother    • No Known Problems Paternal Aunt    • Ovarian cancer Neg Hx    • Uterine cancer Neg Hx    • Colon cancer Neg Hx    • Melanoma Neg Hx    • Prostate cancer Neg Hx    • BRCA 1/2 Neg Hx    • Endometrial cancer Neg Hx    • Colon polyps Neg Hx    • Esophageal cancer Neg Hx    • Liver cancer Neg Hx    • Liver disease Neg Hx    • Rectal cancer Neg Hx    • Stomach cancer Neg Hx        Social History      Socioeconomic History   • Marital status:      Spouse name: Not on file   • Number of children: Not on file   • Years of education: Not on file   • Highest education level: Not on file   Tobacco Use   • Smoking status: Never Smoker   • Smokeless tobacco: Never Used   Substance and Sexual Activity   • Alcohol use: No   • Drug use: No   • Sexual activity: Yes     Partners: Male     Birth control/protection: Post-menopausal         Current Outpatient Medications:   •  amitriptyline (ELAVIL) 150 MG tablet, Take 150 mg by mouth every night at bedtime., Disp: , Rfl: 0  •  B Complex Vitamins (VITAMIN B COMPLEX PO), Take 1 tablet by mouth Daily., Disp: , Rfl:   •  clobetasol (TEMOVATE) 0.05 % cream, Apply 1 application topically to the appropriate area as directed 2 (Two) Times a Day. (Patient taking differently: Apply 1 application topically to the appropriate area as directed As Needed.), Disp: 45 g, Rfl: 6  •  hydrOXYzine (ATARAX) 25 MG tablet, Take 25 mg by mouth 3 (Three) Times a Day As Needed for Itching., Disp: , Rfl:   •  levothyroxine (SYNTHROID, LEVOTHROID) 112 MCG tablet, Take 1 tablet by mouth Daily., Disp: 30 tablet, Rfl: 0  •  Magnesium Hydroxide (ARMANDO MILK OF MAGNESIA PO), Take 2 tablets by mouth 2 (Two) Times a Day., Disp: , Rfl:   •  metoprolol succinate XL (TOPROL-XL) 25 MG 24 hr tablet, Take 25 mg by mouth Daily., Disp: , Rfl:   •  Omega-3 Fatty Acids (FISH OIL) 1200 MG capsule delayed-release, Take 1 tablet by mouth Daily., Disp: , Rfl:   •  triamcinolone (KENALOG) 0.1 % cream, Apply  topically to the appropriate area as directed 2 (Two) Times a Day., Disp: , Rfl:   •  estrogens, conjugated, (PREMARIN) 0.3 MG tablet, Take 1 tablet by mouth Daily., Disp: 90 tablet, Rfl: 3    Patient's last menstrual period was 08/31/2012.    Sexual History:           Could not be calculated    Past Surgical History:   Procedure Laterality Date   • BILATERAL SALPINGO OOPHORECTOMY     • BREAST BIOPSY Right     • BRONCHOSCOPY N/A 9/2/2020    Procedure: BRONCHOSCOPY;  Surgeon: Tyler Coburn MD;  Location:  [facility] OR;  Service: Cardiothoracic;  Laterality: N/A;   • COLONOSCOPY N/A 1/8/2020    The entire examined colon is normal on direct and retroflexion views; No specimens collected; Repeat 10 years   • DILATATION AND CURETTAGE  09/13/2012   • HYSTERECTOMY  10/29/2012    TLH w/ BSO due to bleeding issues   • LAPAROSCOPY WITH LASER     • THORACOSCOPY Right 9/2/2020    Procedure: THORACOSCOPY VIDEO ASSISTED WITH RIGHT DECORTICATION; RIGHT INTERCOSTAL NERVE BLOCK;  Surgeon: Tyler Coburn MD;  Location:  [facility] OR;  Service: Cardiothoracic;  Laterality: Right;       Review of Systems   Constitutional: Negative for activity change, appetite change, fatigue, fever, unexpected weight gain and unexpected weight loss.   HENT: Negative for congestion, ear pain, hearing loss, nosebleeds, rhinorrhea, sore throat, tinnitus and trouble swallowing.    Eyes: Negative for blurred vision, pain, discharge, itching and visual disturbance.   Respiratory: Negative for apnea, chest tightness, shortness of breath and wheezing.    Cardiovascular: Negative for chest pain and leg swelling.   Gastrointestinal: Negative for abdominal pain, blood in stool, constipation, diarrhea, nausea, vomiting and GERD.   Endocrine: Negative for heat intolerance, polydipsia and polyuria.   Genitourinary: Negative for breast lump, decreased libido, difficulty urinating, dyspareunia, dysuria, frequency, genital sores, hematuria, menstrual problem, pelvic pain, urgency, urinary incontinence and vaginal pain.   Musculoskeletal: Negative for arthralgias, back pain, joint swelling and myalgias.   Skin: Negative for color change, rash and skin lesions.   Allergic/Immunologic: Negative for environmental allergies, food allergies and immunocompromised state.   Neurological: Negative for dizziness, tremors, seizures, syncope, facial asymmetry, numbness and headache.    Hematological: Negative for adenopathy. Does not bruise/bleed easily.   Psychiatric/Behavioral: Negative for agitation, hallucinations, sleep disturbance, suicidal ideas and depressed mood. The patient is not nervous/anxious.        Objective   Physical Exam  Vitals signs and nursing note reviewed.   Constitutional:       Appearance: She is well-developed.   HENT:      Head: Normocephalic and atraumatic.      Right Ear: External ear normal.      Left Ear: External ear normal.   Eyes:      General: No scleral icterus.        Right eye: No discharge.         Left eye: No discharge.      Conjunctiva/sclera: Conjunctivae normal.   Neck:      Musculoskeletal: Normal range of motion and neck supple.      Thyroid: No thyromegaly.      Vascular: No carotid bruit.   Cardiovascular:      Rate and Rhythm: Regular rhythm.      Heart sounds: Normal heart sounds. No murmur.   Pulmonary:      Effort: Pulmonary effort is normal. No respiratory distress.      Breath sounds: Normal breath sounds.   Chest:      Breasts: Breasts are symmetrical.         Right: No inverted nipple, mass, nipple discharge, skin change or tenderness.         Left: No inverted nipple, mass, nipple discharge, skin change or tenderness.   Abdominal:      General: Bowel sounds are normal. There is no distension.      Palpations: Abdomen is soft. There is no mass.      Tenderness: There is no abdominal tenderness. There is no guarding.      Hernia: No hernia is present. There is no hernia in the left inguinal area.   Genitourinary:     Labia:         Right: No rash, tenderness, lesion or injury.         Left: No rash, tenderness, lesion or injury.       Vagina: Normal. No signs of injury. No vaginal discharge, erythema or bleeding.      Rectum: No mass.      Comments: Cervix, Uterus and Adnexa are surgically absent.  Urethra and urethral meatus normal  Bladder, normal no prolapse  Perineum and anus examined and without lesions  Musculoskeletal: Normal range  "of motion.         General: No tenderness.   Lymphadenopathy:      Head:      Right side of head: No submental, submandibular, tonsillar, preauricular, posterior auricular or occipital adenopathy.      Left side of head: No submental, submandibular, tonsillar, preauricular, posterior auricular or occipital adenopathy.      Cervical: No cervical adenopathy.      Right cervical: No superficial, deep or posterior cervical adenopathy.     Left cervical: No superficial, deep or posterior cervical adenopathy.   Skin:     General: Skin is warm and dry.      Findings: No bruising, erythema or rash.   Neurological:      Mental Status: She is alert and oriented to person, place, and time.      Motor: No abnormal muscle tone.      Coordination: Coordination normal.   Psychiatric:         Behavior: Behavior normal.         Thought Content: Thought content normal.         Judgment: Judgment normal.           Vitals:    01/28/21 1009   BP: 132/80   Weight: 68 kg (150 lb)   Height: 172.7 cm (68\")       Diagnoses and all orders for this visit:    1. Well woman exam with routine gynecological exam (Primary)  Comments:  Normal well woman exam.  Mammogram and bone density ordered    2. Encounter for screening mammogram for malignant neoplasm of breast  Comments:  Mammogram ordered.  Orders:  -     Mammo Screening Digital Tomosynthesis Bilateral With CAD    3. Menopausal state  Comments:  Bone density ordered.  Orders:  -     DEXA Bone Density Axial    4. Menopausal symptoms  Comments:  Doing well with Premarin 0.3 mg tablets and wants to remain on them.  Refill sent to pharmacy.  Orders:  -     estrogens, conjugated, (PREMARIN) 0.3 MG tablet; Take 1 tablet by mouth Daily.  Dispense: 90 tablet; Refill: 3        Normal GYN exam. Will have lab work here. Encouraged SBE.  Pt is aware how to do self breast exam and the importance of same. Discussed weight management and importance of maintaining a healthy weight. Discussed Vitamin D intake " and the importance of adequate vitamin D for both bone health and a healthy immune system.  Discussed daily exercise and the importance of same in regards to a healthy heart as well as helping to maintain her weight and improving her mental health.  Body mass index is 22.81 kg/m². Colonoscopy is up to date.  Mammogram will be scheduled at [facility]. Pap smear is done per ASCCP guidelines.    Patient's Body mass index is 22.81 kg/m². BMI is within normal parameters. No follow-up required..             Non-Smoker    MyChart Instructions Given

## 2021-02-02 ENCOUNTER — HOSPITAL ENCOUNTER (OUTPATIENT)
Dept: MAMMOGRAPHY | Facility: HOSPITAL | Age: 51
Discharge: HOME | End: 2021-02-02
Admitting: NURSE PRACTITIONER

## 2021-02-02 DIAGNOSIS — Z01.419 WELL WOMAN EXAM WITH ROUTINE GYNECOLOGICAL EXAM: Primary | ICD-10-CM

## 2021-02-02 DIAGNOSIS — E55.9 VITAMIN D DEFICIENCY: ICD-10-CM

## 2021-02-02 PROCEDURE — 77063 BREAST TOMOSYNTHESIS BI: CPT

## 2021-02-02 PROCEDURE — 77067 SCR MAMMO BI INCL CAD: CPT

## 2021-02-03 LAB
25(OH)D3+25(OH)D2 SERPL-MCNC: 51.1 NG/ML (ref 30–100)
ALBUMIN SERPL-MCNC: 4.3 G/DL (ref 3.5–5.2)
ALBUMIN/GLOB SERPL: 1.7 G/DL
ALP SERPL-CCNC: 75 U/L (ref 39–117)
ALT SERPL-CCNC: 29 U/L (ref 1–33)
AST SERPL-CCNC: 28 U/L (ref 1–32)
BASOPHILS # BLD AUTO: 0.04 10*3/MM3 (ref 0–0.2)
BASOPHILS NFR BLD AUTO: 0.8 % (ref 0–1.5)
BILIRUB SERPL-MCNC: <0.2 MG/DL (ref 0–1.2)
BUN SERPL-MCNC: 7 MG/DL (ref 6–20)
BUN/CREAT SERPL: 10.6 (ref 7–25)
CALCIUM SERPL-MCNC: 9.3 MG/DL (ref 8.6–10.5)
CHLORIDE SERPL-SCNC: 102 MMOL/L (ref 98–107)
CHOLEST SERPL-MCNC: 260 MG/DL (ref 0–200)
CO2 SERPL-SCNC: 30.5 MMOL/L (ref 22–29)
CREAT SERPL-MCNC: 0.66 MG/DL (ref 0.57–1)
EOSINOPHIL # BLD AUTO: 0.32 10*3/MM3 (ref 0–0.4)
EOSINOPHIL NFR BLD AUTO: 6.7 % (ref 0.3–6.2)
ERYTHROCYTE [DISTWIDTH] IN BLOOD BY AUTOMATED COUNT: 13.5 % (ref 12.3–15.4)
GLOBULIN SER CALC-MCNC: 2.5 GM/DL
GLUCOSE SERPL-MCNC: 90 MG/DL (ref 65–99)
HBA1C MFR BLD: 5.9 % (ref 4.8–5.6)
HCT VFR BLD AUTO: 37.8 % (ref 34–46.6)
HDLC SERPL-MCNC: 75 MG/DL (ref 40–60)
HGB BLD-MCNC: 12.5 G/DL (ref 12–15.9)
IMM GRANULOCYTES # BLD AUTO: 0.01 10*3/MM3 (ref 0–0.05)
IMM GRANULOCYTES NFR BLD AUTO: 0.2 % (ref 0–0.5)
LDLC SERPL CALC-MCNC: 152 MG/DL (ref 0–100)
LDLC/HDLC SERPL: 1.97 {RATIO}
LYMPHOCYTES # BLD AUTO: 1.79 10*3/MM3 (ref 0.7–3.1)
LYMPHOCYTES NFR BLD AUTO: 37.5 % (ref 19.6–45.3)
MCH RBC QN AUTO: 29.8 PG (ref 26.6–33)
MCHC RBC AUTO-ENTMCNC: 33.1 G/DL (ref 31.5–35.7)
MCV RBC AUTO: 90 FL (ref 79–97)
MONOCYTES # BLD AUTO: 0.38 10*3/MM3 (ref 0.1–0.9)
MONOCYTES NFR BLD AUTO: 8 % (ref 5–12)
NEUTROPHILS # BLD AUTO: 2.23 10*3/MM3 (ref 1.7–7)
NEUTROPHILS NFR BLD AUTO: 46.8 % (ref 42.7–76)
NRBC BLD AUTO-RTO: 0 /100 WBC (ref 0–0.2)
PLATELET # BLD AUTO: 298 10*3/MM3 (ref 140–450)
POTASSIUM SERPL-SCNC: 4.3 MMOL/L (ref 3.5–5.2)
PROT SERPL-MCNC: 6.8 G/DL (ref 6–8.5)
RBC # BLD AUTO: 4.2 10*6/MM3 (ref 3.77–5.28)
SODIUM SERPL-SCNC: 140 MMOL/L (ref 136–145)
T3RU NFR SERPL: 24 % (ref 24–39)
T4 FREE SERPL-MCNC: 1.18 NG/DL (ref 0.93–1.7)
TRIGL SERPL-MCNC: 188 MG/DL (ref 0–150)
TSH SERPL DL<=0.005 MIU/L-ACNC: 2.99 UIU/ML (ref 0.27–4.2)
VLDLC SERPL CALC-MCNC: 33 MG/DL (ref 5–40)
WBC # BLD AUTO: 4.77 10*3/MM3 (ref 3.4–10.8)

## 2021-07-02 DIAGNOSIS — N90.4 LICHEN SCLEROSUS OF FEMALE GENITALIA: ICD-10-CM

## 2021-07-02 RX ORDER — CLOBETASOL PROPIONATE 0.5 MG/G
1 CREAM TOPICAL 2 TIMES DAILY
Qty: 45 G | Refills: 6 | Status: SHIPPED | OUTPATIENT
Start: 2021-07-02 | End: 2022-08-02 | Stop reason: SDUPTHER

## 2021-07-02 NOTE — TELEPHONE ENCOUNTER
[facility] req Rf on Temovate cream. States she only gets it filled once a year & her RF have . LOV 21. I've pend med.

## 2021-09-07 ENCOUNTER — OFFICE VISIT (OUTPATIENT)
Dept: INTERNAL MEDICINE | Facility: CLINIC | Age: 51
End: 2021-09-07

## 2021-09-07 VITALS
TEMPERATURE: 98 F | SYSTOLIC BLOOD PRESSURE: 141 MMHG | OXYGEN SATURATION: 98 % | HEART RATE: 78 BPM | WEIGHT: 152 LBS | BODY MASS INDEX: 23.11 KG/M2 | DIASTOLIC BLOOD PRESSURE: 93 MMHG

## 2021-09-07 DIAGNOSIS — R74.01 TRANSAMINITIS: ICD-10-CM

## 2021-09-07 DIAGNOSIS — E78.5 DYSLIPIDEMIA: Primary | ICD-10-CM

## 2021-09-07 PROCEDURE — 99213 OFFICE O/P EST LOW 20 MIN: CPT | Performed by: INTERNAL MEDICINE

## 2021-09-07 RX ORDER — SIMVASTATIN 20 MG
20 TABLET ORAL DAILY
COMMUNITY
Start: 2021-08-17 | End: 2022-02-15 | Stop reason: SDUPTHER

## 2021-09-07 NOTE — PROGRESS NOTES
Subjective     Chief Complaint   Patient presents with   • Hyperlipidemia     Establish care       History of Present Illness  PCP's office was sold to [facility].   Patient lives in [place].     Sees Vaishnavi Conley.   Labs in Epic from February.     8/31/20:  Was in the hospital for long period. Saw Dr. Coburn. HX of Empyema.   THORACOSCOPY VIDEO ASSISTED WITH RIGHT DECORTICATION; RIGHT INTERCOSTAL NERVE BLOCK Right General   BRONCHOSCOPY       Colonoscopy done in February. Follow up in ten years.   Mammogram yearly.     Patient's PMR from outside medical facility reviewed and noted.    Review of Systems   Constitutional: Negative for chills and fever.   HENT: Negative for congestion and rhinorrhea.    Respiratory: Negative for cough and shortness of breath.    Gastrointestinal: Negative for constipation and diarrhea.   Genitourinary: Negative for dysuria and hematuria.      Otherwise complete ROS reviewed and negative except as mentioned in the HPI.    Past Medical History:   Past Medical History:   Diagnosis Date   • Anemia    • Cyst, ovary, follicular    • Hypothyroidism    • Lichen sclerosus    • Migraine      Past Surgical History:  Past Surgical History:   Procedure Laterality Date   • BILATERAL SALPINGO OOPHORECTOMY     • BREAST BIOPSY Right    • BRONCHOSCOPY N/A 9/2/2020    Procedure: BRONCHOSCOPY;  Surgeon: Tyler Coburn MD;  Location: [facility] OR;  Service: Cardiothoracic;  Laterality: N/A;   • COLONOSCOPY N/A 1/8/2020    The entire examined colon is normal on direct and retroflexion views; No specimens collected; Repeat 10 years   • DILATATION AND CURETTAGE  09/13/2012   • HYSTERECTOMY  10/29/2012    TLH w/ BSO due to bleeding issues   • LAPAROSCOPY WITH LASER     • OOPHORECTOMY     • THORACOSCOPY Right 9/2/2020    Procedure: THORACOSCOPY VIDEO ASSISTED WITH RIGHT DECORTICATION; RIGHT INTERCOSTAL NERVE BLOCK;  Surgeon: Tyler Coburn MD;  Location: [facility] OR;  Service: Cardiothoracic;   Laterality: Right;     Social History:  reports that she has never smoked. She has never used smokeless tobacco. She reports that she does not drink alcohol and does not use drugs.    Family History: family history includes Breast cancer (age of onset: 45) in her cousin; Breast cancer (age of onset: 64) in her maternal aunt; Hypertension in her mother; No Known Problems in her brother, daughter, father, maternal grandmother, paternal aunt, paternal grandmother, sister, son, and another family member; Other in her mother.      Allergies:  No Known Allergies  Medications:  Prior to Admission medications    Medication Sig Start Date End Date Taking? Authorizing Provider   amitriptyline (ELAVIL) 150 MG tablet Take 150 mg by mouth every night at bedtime. 10/17/19  Yes Jeana Maciel MD   B Complex Vitamins (VITAMIN B COMPLEX PO) Take 1 tablet by mouth Daily.   Yes Jeana Maciel MD   clobetasol (TEMOVATE) 0.05 % cream Apply 1 application topically to the appropriate area as directed 2 (Two) Times a Day. 7/2/21  Yes Emma Mercado APRN   estrogens, conjugated, (PREMARIN) 0.3 MG tablet Take 1 tablet by mouth Daily. 1/28/21  Yes Emma Mercado APRN   levothyroxine (SYNTHROID, LEVOTHROID) 112 MCG tablet Take 1 tablet by mouth Daily. 10/1/18  Yes Vaishnavi Conley APRN   Magnesium Hydroxide (ARMANDO MILK OF MAGNESIA PO) Take 2 tablets by mouth 2 (Two) Times a Day.   Yes Jeana Maciel MD   metoprolol succinate XL (TOPROL-XL) 25 MG 24 hr tablet Take 25 mg by mouth Daily. 1/6/21  Yes Jeana Maciel MD   Omega-3 Fatty Acids (FISH OIL) 1200 MG capsule delayed-release Take 1 tablet by mouth Daily.   Yes Jeana Maciel MD   simvastatin (ZOCOR) 20 MG tablet Take 20 mg by mouth Daily. 8/17/21  Yes Jeana Maciel MD   triamcinolone (KENALOG) 0.1 % cream Apply  topically to the appropriate area as directed 2 (Two) Times a Day.   Yes Jeana Maciel MD   hydrOXYzine (ATARAX) 25  MG tablet Take 25 mg by mouth 3 (Three) Times a Day As Needed for Itching.  9/7/21  Provider, MD Jeana       Objective     Vital Signs: /93 (BP Location: Left arm, Patient Position: Sitting, Cuff Size: Adult)   Pulse 78   Temp 98 °F (36.7 °C) (Infrared)   Wt 68.9 kg (152 lb)   LMP 08/31/2012   SpO2 98%   Breastfeeding No   BMI 23.11 kg/m²   Physical Exam  Vitals reviewed.   Constitutional:       Appearance: Normal appearance.   HENT:      Head: Normocephalic and atraumatic.      Nose: Nose normal.   Eyes:      General: No scleral icterus.     Conjunctiva/sclera: Conjunctivae normal.   Cardiovascular:      Rate and Rhythm: Normal rate and regular rhythm.      Heart sounds: Normal heart sounds.   Pulmonary:      Effort: Pulmonary effort is normal.      Breath sounds: Normal breath sounds.   Musculoskeletal:         General: No swelling or tenderness.      Cervical back: Normal range of motion and neck supple.   Skin:     General: Skin is warm and dry.   Neurological:      General: No focal deficit present.      Mental Status: She is alert.      Cranial Nerves: No cranial nerve deficit.   Psychiatric:         Mood and Affect: Mood normal.         Behavior: Behavior normal.       Patient's Body mass index is 23.11 kg/m². indicating that she is within normal range (BMI 18.5-24.9). No BMI management plan needed..      Results Reviewed:  Glucose   Date Value Ref Range Status   09/07/2020 107 (H) 65 - 99 mg/dL Final     BUN   Date Value Ref Range Status   02/02/2021 7 6 - 20 mg/dL Final   09/07/2020 7 6 - 20 mg/dL Final     Creatinine   Date Value Ref Range Status   02/02/2021 0.66 0.57 - 1.00 mg/dL Final   09/07/2020 0.33 (L) 0.57 - 1.00 mg/dL Final     Sodium   Date Value Ref Range Status   02/02/2021 140 136 - 145 mmol/L Final   09/07/2020 134 (L) 136 - 145 mmol/L Final     Potassium   Date Value Ref Range Status   02/02/2021 4.3 3.5 - 5.2 mmol/L Final   09/07/2020 4.4 3.5 - 5.2 mmol/L Final      Chloride   Date Value Ref Range Status   02/02/2021 102 98 - 107 mmol/L Final   09/07/2020 98 98 - 107 mmol/L Final     CO2   Date Value Ref Range Status   09/07/2020 27.0 22.0 - 29.0 mmol/L Final     Total CO2   Date Value Ref Range Status   02/02/2021 30.5 (H) 22.0 - 29.0 mmol/L Final     Calcium   Date Value Ref Range Status   02/02/2021 9.3 8.6 - 10.5 mg/dL Final   09/07/2020 8.7 8.6 - 10.5 mg/dL Final     ALT (SGPT)   Date Value Ref Range Status   02/02/2021 29 1 - 33 U/L Final   09/07/2020 68 (H) 1 - 33 U/L Final     AST (SGOT)   Date Value Ref Range Status   02/02/2021 28 1 - 32 U/L Final   09/07/2020 63 (H) 1 - 32 U/L Final     WBC   Date Value Ref Range Status   02/02/2021 4.77 3.40 - 10.80 10*3/mm3 Final     Hematocrit   Date Value Ref Range Status   02/02/2021 37.8 34.0 - 46.6 % Final   09/14/2020 30.8 (L) 34.0 - 46.6 % Final     Platelets   Date Value Ref Range Status   02/02/2021 298 140 - 450 10*3/mm3 Final   09/14/2020 1,099 (C) 140 - 450 10*3/mm3 Final     Total Cholesterol   Date Value Ref Range Status   09/01/2020 66 0 - 200 mg/dL Final     Triglycerides   Date Value Ref Range Status   02/02/2021 188 (H) 0 - 150 mg/dL Final   09/01/2020 141 0 - 150 mg/dL Final     HDL Cholesterol   Date Value Ref Range Status   02/02/2021 75 (H) 40 - 60 mg/dL Final   09/01/2020 17 (L) 40 - 60 mg/dL Final     LDL Chol Calc (NIH)   Date Value Ref Range Status   02/02/2021 152 (H) 0 - 100 mg/dL Final     LDL/HDL RATIO   Date Value Ref Range Status   02/02/2021 1.97  Final     Hemoglobin A1C   Date Value Ref Range Status   02/02/2021 5.90 (H) 4.80 - 5.60 % Final     Comment:     Hemoglobin A1C Ranges:  Increased Risk for Diabetes  5.7% to 6.4%  Diabetes                     >= 6.5%  Diabetic Goal                < 7.0%     09/01/2020 6.10 (H) 4.80 - 5.60 % Final         Assessment / Plan     Assessment/Plan:  1. Dyslipidemia  - Lipid panel    2. Transaminitis  - Comprehensive metabolic panel    Discussed possibility  of pneumonia vaccination.       Return in about 6 months (around 3/7/2022) for Recheck, Next scheduled follow up. unless patient needs to be seen sooner or acute issues arise.    Code Status: Full    I have discussed the patient results/orders and and plan/recommendation with them at today's visit.      Rick Corado, DO   09/07/2021

## 2021-09-08 LAB
ALBUMIN SERPL-MCNC: 4.4 G/DL (ref 3.8–4.9)
ALBUMIN/GLOB SERPL: 1.6 {RATIO} (ref 1.2–2.2)
ALP SERPL-CCNC: 90 IU/L (ref 48–121)
ALT SERPL-CCNC: 22 IU/L (ref 0–32)
AST SERPL-CCNC: 25 IU/L (ref 0–40)
BILIRUB SERPL-MCNC: <0.2 MG/DL (ref 0–1.2)
BUN SERPL-MCNC: 7 MG/DL (ref 6–24)
BUN/CREAT SERPL: 10 (ref 9–23)
CALCIUM SERPL-MCNC: 9.1 MG/DL (ref 8.7–10.2)
CHLORIDE SERPL-SCNC: 104 MMOL/L (ref 96–106)
CHOLEST SERPL-MCNC: 176 MG/DL (ref 100–199)
CO2 SERPL-SCNC: 25 MMOL/L (ref 20–29)
CREAT SERPL-MCNC: 0.67 MG/DL (ref 0.57–1)
GLOBULIN SER CALC-MCNC: 2.7 G/DL (ref 1.5–4.5)
GLUCOSE SERPL-MCNC: 101 MG/DL (ref 65–99)
HDLC SERPL-MCNC: 79 MG/DL
LDLC SERPL CALC-MCNC: 77 MG/DL (ref 0–99)
POTASSIUM SERPL-SCNC: 3.9 MMOL/L (ref 3.5–5.2)
PROT SERPL-MCNC: 7.1 G/DL (ref 6–8.5)
SODIUM SERPL-SCNC: 141 MMOL/L (ref 134–144)
TRIGL SERPL-MCNC: 115 MG/DL (ref 0–149)
VLDLC SERPL CALC-MCNC: 20 MG/DL (ref 5–40)

## 2021-12-13 RX ORDER — METOPROLOL SUCCINATE 25 MG/1
25 TABLET, EXTENDED RELEASE ORAL DAILY
Qty: 90 TABLET | Refills: 3 | Status: SHIPPED | OUTPATIENT
Start: 2021-12-13 | End: 2022-12-27 | Stop reason: SDUPTHER

## 2021-12-13 NOTE — TELEPHONE ENCOUNTER
Caller: Heidi Fernandez    Relationship: Self    Best call back number: [phone]  Requested Prescriptions:   Requested Prescriptions     Pending Prescriptions Disp Refills   • metoprolol succinate XL (TOPROL-XL) 25 MG 24 hr tablet       Sig: Take 1 tablet by mouth Daily.        Pharmacy where request should be sent: [facility] [address] [place] [address] [phone] [facility] [phone] FX     Does the patient have less than a 3 day supply:  [x] Yes  [] No    Jerry De La Paz Rep   12/13/21 10:10 CST

## 2022-02-09 DIAGNOSIS — N95.1 MENOPAUSAL SYMPTOMS: ICD-10-CM

## 2022-02-15 DIAGNOSIS — E03.8 OTHER SPECIFIED HYPOTHYROIDISM: ICD-10-CM

## 2022-02-15 RX ORDER — LEVOTHYROXINE SODIUM 112 UG/1
112 TABLET ORAL DAILY
Qty: 30 TABLET | Refills: 0 | Status: SHIPPED | OUTPATIENT
Start: 2022-02-15 | End: 2022-03-16 | Stop reason: SDUPTHER

## 2022-02-15 RX ORDER — SIMVASTATIN 20 MG
20 TABLET ORAL DAILY
Qty: 90 TABLET | Refills: 3 | Status: SHIPPED | OUTPATIENT
Start: 2022-02-15 | End: 2023-01-23 | Stop reason: SDUPTHER

## 2022-02-15 RX ORDER — AMITRIPTYLINE HYDROCHLORIDE 150 MG/1
150 TABLET, FILM COATED ORAL
Qty: 90 TABLET | Refills: 3 | Status: SHIPPED | OUTPATIENT
Start: 2022-02-15 | End: 2023-01-23 | Stop reason: SDUPTHER

## 2022-02-15 NOTE — TELEPHONE ENCOUNTER
Caller: KEITH ALBRECHT     Relationship: SELF    Best call back number: [phone] (H)    Requested Prescriptions:   Requested Prescriptions     Pending Prescriptions Disp Refills   • amitriptyline (ELAVIL) 150 MG tablet  0     Sig: Take 1 tablet by mouth every night at bedtime.   • simvastatin (ZOCOR) 20 MG tablet       Sig: Take 1 tablet by mouth Daily.      levothyroxine (SYNTHROID, LEVOTHROID) 112 MCG tablet  112 mcg, Daily         Pharmacy where request should be sent:    [address] - [phone] [facility] [phone]   [phone]  Additional details provided by patient:     Does the patient have less than a 3 day supply:  [x] Yes  [] No    Jerry Pineda Rep   02/15/22 10:59 CST

## 2022-02-15 NOTE — TELEPHONE ENCOUNTER
Do you want this pt seen first? Meds not filled by you    Rx Refill Note  Requested Prescriptions     Pending Prescriptions Disp Refills   • amitriptyline (ELAVIL) 150 MG tablet  0     Sig: Take 1 tablet by mouth every night at bedtime.   • simvastatin (ZOCOR) 20 MG tablet       Sig: Take 1 tablet by mouth Daily.   • levothyroxine (SYNTHROID, LEVOTHROID) 112 MCG tablet 30 tablet 0     Sig: Take 1 tablet by mouth Daily.      Last office visit with prescribing clinician: 9/7/2021      Next office visit with prescribing clinician: Visit date not found       {TIP  Please add Last Relevant Lab Date if appropriate:23}     Tracy Alex RN  02/15/22, 11:44 CST

## 2022-03-16 DIAGNOSIS — E03.8 OTHER SPECIFIED HYPOTHYROIDISM: ICD-10-CM

## 2022-03-16 NOTE — TELEPHONE ENCOUNTER
Rx Refill Note  Requested Prescriptions     Pending Prescriptions Disp Refills   • levothyroxine (SYNTHROID, LEVOTHROID) 112 MCG tablet 30 tablet 0     Sig: Take 1 tablet by mouth Daily.      Last office visit with prescribing clinician: 9/7/2021      Next office visit with prescribing clinician: Visit date not found            Yahaira Nathan MA  03/16/22, 14:00 CDT

## 2022-03-16 NOTE — TELEPHONE ENCOUNTER
Caller: Heidi Fernandez    Relationship: Self    Best call back number:  [phone]    Requested Prescriptions:   Requested Prescriptions     Pending Prescriptions Disp Refills   • levothyroxine (SYNTHROID, LEVOTHROID) 112 MCG tablet 30 tablet 0     Sig: Take 1 tablet by mouth Daily.        Pharmacy where request should be sent: [facility] [address] [place] [address] [phone] [facility] [phone] FX         Does the patient have less than a 3 day supply:  [x] Yes  [] No    Tomas Cohen MA   03/16/22 13:53 CDT

## 2022-03-17 RX ORDER — LEVOTHYROXINE SODIUM 112 UG/1
112 TABLET ORAL DAILY
Qty: 30 TABLET | Refills: 0 | Status: SHIPPED | OUTPATIENT
Start: 2022-03-17 | End: 2022-04-18 | Stop reason: SDUPTHER

## 2022-04-18 DIAGNOSIS — E03.8 OTHER SPECIFIED HYPOTHYROIDISM: ICD-10-CM

## 2022-04-18 NOTE — TELEPHONE ENCOUNTER
Caller: FernandezHeidi siu    Relationship: Self    Best call back number: [phone]      Requested Prescriptions:   Requested Prescriptions      No prescriptions requested or ordered in this encounter        Pharmacy where request should be sent: [address] [phone] [facility] [phone]      Additional details provided by patient: 2 left   Does the patient have less than a 3 day supply:  [x] Yes  [] No    Jerry Joyce Rep   04/18/22 09:09 CDT

## 2022-04-18 NOTE — TELEPHONE ENCOUNTER
Rx Refill Note  Requested Prescriptions     Pending Prescriptions Disp Refills   • levothyroxine (SYNTHROID, LEVOTHROID) 112 MCG tablet 30 tablet 0     Sig: Take 1 tablet by mouth Daily.      Last office visit with prescribing clinician: 9/7/2021      Next office visit with prescribing clinician: 5/3/2022            Narda Bonner CMA  04/18/22, 16:27 CDT

## 2022-04-19 RX ORDER — LEVOTHYROXINE SODIUM 112 UG/1
112 TABLET ORAL DAILY
Qty: 30 TABLET | Refills: 0 | Status: SHIPPED | OUTPATIENT
Start: 2022-04-19 | End: 2022-05-03 | Stop reason: SDUPTHER

## 2022-05-03 ENCOUNTER — OFFICE VISIT (OUTPATIENT)
Dept: INTERNAL MEDICINE | Facility: CLINIC | Age: 52
End: 2022-05-03

## 2022-05-03 VITALS
OXYGEN SATURATION: 98 % | HEIGHT: 68 IN | SYSTOLIC BLOOD PRESSURE: 101 MMHG | BODY MASS INDEX: 23.34 KG/M2 | WEIGHT: 154 LBS | TEMPERATURE: 97.5 F | HEART RATE: 90 BPM | DIASTOLIC BLOOD PRESSURE: 69 MMHG

## 2022-05-03 DIAGNOSIS — E78.5 DYSLIPIDEMIA: Primary | ICD-10-CM

## 2022-05-03 DIAGNOSIS — L98.9 SKIN LESION: ICD-10-CM

## 2022-05-03 DIAGNOSIS — Z12.31 BREAST CANCER SCREENING BY MAMMOGRAM: ICD-10-CM

## 2022-05-03 DIAGNOSIS — E03.8 OTHER SPECIFIED HYPOTHYROIDISM: ICD-10-CM

## 2022-05-03 DIAGNOSIS — N95.1 MENOPAUSAL SYMPTOMS: ICD-10-CM

## 2022-05-03 PROCEDURE — 99214 OFFICE O/P EST MOD 30 MIN: CPT | Performed by: INTERNAL MEDICINE

## 2022-05-03 RX ORDER — LEVOTHYROXINE SODIUM 112 UG/1
112 TABLET ORAL DAILY
Qty: 90 TABLET | Refills: 3 | Status: SHIPPED | OUTPATIENT
Start: 2022-05-03 | End: 2023-01-23 | Stop reason: SDUPTHER

## 2022-05-03 NOTE — PROGRESS NOTES
Subjective     Chief Complaint   Patient presents with   • Annual Exam       Hyperlipidemia  This is a chronic problem. The current episode started more than 1 year ago. The problem is controlled. Recent lipid tests were reviewed and are high. There are no known factors aggravating her hyperlipidemia. Pertinent negatives include no chest pain, myalgias or shortness of breath. Current antihyperlipidemic treatment includes statins. The current treatment provides mild improvement of lipids. There are no compliance problems.  Risk factors for coronary artery disease include post-menopausal.     51 year old female who presents for FU on thyroid and cholesterol issues. She is doing well and has no complaints. No myalgias. No hair loss or fatigue.     Patient's PMR from outside medical facility reviewed and noted.    Review of Systems   Constitutional: Negative for chills and fever.   HENT: Negative for congestion and rhinorrhea.    Respiratory: Negative for cough and shortness of breath.    Cardiovascular: Negative for chest pain and leg swelling.   Gastrointestinal: Negative for blood in stool, constipation and diarrhea.   Genitourinary: Negative for dysuria and hematuria.   Musculoskeletal: Negative for myalgias.      Otherwise complete ROS reviewed and negative except as mentioned in the HPI.    Past Medical History:   Past Medical History:   Diagnosis Date   • Anemia    • Cyst, ovary, follicular    • Hypothyroidism    • Lichen sclerosus    • Migraine      Past Surgical History:  Past Surgical History:   Procedure Laterality Date   • BILATERAL SALPINGO OOPHORECTOMY     • BREAST BIOPSY Right    • BRONCHOSCOPY N/A 9/2/2020    Procedure: BRONCHOSCOPY;  Surgeon: Tyler Coburn MD;  Location: [facility];  Service: Cardiothoracic;  Laterality: N/A;   • COLONOSCOPY N/A 1/8/2020    The entire examined colon is normal on direct and retroflexion views; No specimens collected; Repeat 10 years   • DILATATION AND CURETTAGE   09/13/2012   • HYSTERECTOMY  10/29/2012    [facility] w/ BSO due to bleeding issues   • LAPAROSCOPY WITH LASER     • OOPHORECTOMY     • THORACOSCOPY Right 9/2/2020    Procedure: THORACOSCOPY VIDEO ASSISTED WITH RIGHT DECORTICATION; RIGHT INTERCOSTAL NERVE BLOCK;  Surgeon: Tyler Coburn MD;  Location: [facility];  Service: Cardiothoracic;  Laterality: Right;     Social History:  reports that she has never smoked. She has never used smokeless tobacco. She reports that she does not drink alcohol and does not use drugs.    Family History: family history includes Breast cancer (age of onset: 45) in her cousin; Breast cancer (age of onset: 64) in her maternal aunt; Hypertension in her mother; No Known Problems in her brother, daughter, father, maternal grandmother, paternal aunt, paternal grandmother, sister, son, and another family member; Other in her mother.      Allergies:  No Known Allergies  Medications:  Prior to Admission medications    Medication Sig Start Date End Date Taking? Authorizing Provider   amitriptyline (ELAVIL) 150 MG tablet Take 1 tablet by mouth every night at bedtime. 2/15/22  Yes Rick Corado DO   B Complex Vitamins (VITAMIN B COMPLEX PO) Take 1 tablet by mouth Daily.   Yes ProviderJeana MD   clobetasol (TEMOVATE) 0.05 % cream Apply 1 application topically to the appropriate area as directed 2 (Two) Times a Day. 7/2/21  Yes Emma Mercado APRN   estrogens, conjugated, (PREMARIN) 0.3 MG tablet Take 1 tablet by mouth Daily. 5/3/22  Yes Rikc Corado DO   levothyroxine (SYNTHROID, LEVOTHROID) 112 MCG tablet Take 1 tablet by mouth Daily. 5/3/22  Yes Rick Corado DO   Magnesium Hydroxide (ARMANDO MILK OF MAGNESIA PO) Take 2 tablets by mouth 2 (Two) Times a Day.   Yes Jeana Maciel MD   metoprolol succinate XL (TOPROL-XL) 25 MG 24 hr tablet Take 1 tablet by mouth Daily. 12/13/21  Yes Rick Corado DO   Omega-3 Fatty Acids (FISH OIL) 1200 MG capsule  "delayed-release Take 1 tablet by mouth Daily.   Yes ProviderJeana MD   simvastatin (ZOCOR) 20 MG tablet Take 1 tablet by mouth Daily. 2/15/22  Yes Rick Corado DO   triamcinolone (KENALOG) 0.1 % cream Apply  topically to the appropriate area as directed 2 (Two) Times a Day.   Yes Jeana Maciel MD   estrogens, conjugated, (PREMARIN) 0.3 MG tablet Take 1 tablet by mouth Daily. 2/9/22 5/3/22 Yes Emma Mercado APRN   levothyroxine (SYNTHROID, LEVOTHROID) 112 MCG tablet Take 1 tablet by mouth Daily. 4/19/22 5/3/22 Yes Rick Corado DO       Objective     Vital Signs: /69 (BP Location: Left arm, Patient Position: Sitting, Cuff Size: Adult)   Pulse 90   Temp 97.5 °F (36.4 °C) (Infrared)   Ht 172.7 cm (68\")   Wt 69.9 kg (154 lb)   LMP 08/31/2012   SpO2 98%   Breastfeeding No   BMI 23.42 kg/m²   Physical Exam  Vitals reviewed.   Constitutional:       Appearance: Normal appearance.   HENT:      Head: Normocephalic and atraumatic.      Right Ear: External ear normal.      Left Ear: External ear normal.      Nose: Nose normal.   Eyes:      General: No scleral icterus.     Conjunctiva/sclera: Conjunctivae normal.   Cardiovascular:      Rate and Rhythm: Normal rate and regular rhythm.      Heart sounds: Normal heart sounds.   Pulmonary:      Effort: Pulmonary effort is normal.      Breath sounds: Normal breath sounds.   Musculoskeletal:         General: No swelling or tenderness.      Cervical back: Normal range of motion and neck supple.   Skin:     General: Skin is warm and dry.      Comments: Right corner of the eye. Flat lesion no scaling. Circular. Rough.    Neurological:      General: No focal deficit present.      Mental Status: She is alert.      Cranial Nerves: No cranial nerve deficit.   Psychiatric:         Mood and Affect: Mood normal.         Behavior: Behavior normal.       BMI is within normal parameters. No follow-up required.      Results Reviewed:  Glucose   Date " Value Ref Range Status   09/07/2021 101 (H) 65 - 99 mg/dL Final   09/07/2020 107 (H) 65 - 99 mg/dL Final     BUN   Date Value Ref Range Status   09/07/2021 7 6 - 24 mg/dL Final   09/07/2020 7 6 - 20 mg/dL Final     Creatinine   Date Value Ref Range Status   09/07/2021 0.67 0.57 - 1.00 mg/dL Final   09/07/2020 0.33 (L) 0.57 - 1.00 mg/dL Final     Sodium   Date Value Ref Range Status   09/07/2021 141 134 - 144 mmol/L Final   09/07/2020 134 (L) 136 - 145 mmol/L Final     Potassium   Date Value Ref Range Status   09/07/2021 3.9 3.5 - 5.2 mmol/L Final   09/07/2020 4.4 3.5 - 5.2 mmol/L Final     Chloride   Date Value Ref Range Status   09/07/2021 104 96 - 106 mmol/L Final   09/07/2020 98 98 - 107 mmol/L Final     CO2   Date Value Ref Range Status   09/07/2020 27.0 22.0 - 29.0 mmol/L Final     Total CO2   Date Value Ref Range Status   09/07/2021 25 20 - 29 mmol/L Final     Calcium   Date Value Ref Range Status   09/07/2021 9.1 8.7 - 10.2 mg/dL Final   09/07/2020 8.7 8.6 - 10.5 mg/dL Final     ALT (SGPT)   Date Value Ref Range Status   09/07/2021 22 0 - 32 IU/L Final   09/07/2020 68 (H) 1 - 33 U/L Final     AST (SGOT)   Date Value Ref Range Status   09/07/2021 25 0 - 40 IU/L Final   09/07/2020 63 (H) 1 - 32 U/L Final     WBC   Date Value Ref Range Status   02/02/2021 4.77 3.40 - 10.80 10*3/mm3 Final     Hematocrit   Date Value Ref Range Status   02/02/2021 37.8 34.0 - 46.6 % Final   09/14/2020 30.8 (L) 34.0 - 46.6 % Final     Platelets   Date Value Ref Range Status   02/02/2021 298 140 - 450 10*3/mm3 Final   09/14/2020 1,099 (C) 140 - 450 10*3/mm3 Final     Total Cholesterol   Date Value Ref Range Status   09/01/2020 66 0 - 200 mg/dL Final     Triglycerides   Date Value Ref Range Status   09/07/2021 115 0 - 149 mg/dL Final   09/01/2020 141 0 - 150 mg/dL Final     HDL Cholesterol   Date Value Ref Range Status   09/07/2021 79 >39 mg/dL Final   09/01/2020 17 (L) 40 - 60 mg/dL Final     LDL Chol Calc ([facility])   Date Value Ref  Range Status   09/07/2021 77 0 - 99 mg/dL Final     LDL/HDL RATIO   Date Value Ref Range Status   02/02/2021 1.97  Final     Hemoglobin A1C   Date Value Ref Range Status   02/02/2021 5.90 (H) 4.80 - 5.60 % Final     Comment:     Hemoglobin A1C Ranges:  Increased Risk for Diabetes  5.7% to 6.4%  Diabetes                     >= 6.5%  Diabetic Goal                < 7.0%     09/01/2020 6.10 (H) 4.80 - 5.60 % Final        Assessment / Plan     Assessment/Plan:  1. Other specified hypothyroidism  - levothyroxine (SYNTHROID, LEVOTHROID) 112 MCG tablet; Take 1 tablet by mouth Daily.  Dispense: 90 tablet; Refill: 3  - T4; Future  - TSH; Future    2. Menopausal symptoms  - estrogens, conjugated, (PREMARIN) 0.3 MG tablet; Take 1 tablet by mouth Daily.  Dispense: 90 tablet; Refill: 3    3. Dyslipidemia  - CBC w AUTO Differential; Future  - Comprehensive metabolic panel; Future  - Lipid panel; Future    4. Breast cancer screening by mammogram  - Mammo Screening Bilateral With CAD; Future  - Discussed genetic testing with family history.       5. Skin lesion right corner of eye.   - Refer to dermatology       Return in about 6 months (around 11/3/2022) for Recheck, Next scheduled follow up. unless patient needs to be seen sooner or acute issues arise.    Code Status: Full    I have discussed the patient results/orders and and plan/recommendation with them at today's visit.      Rick Corado,    05/03/2022

## 2022-05-17 ENCOUNTER — HOSPITAL ENCOUNTER (OUTPATIENT)
Dept: MAMMOGRAPHY | Facility: HOSPITAL | Age: 52
Discharge: HOME | End: 2022-05-17
Admitting: INTERNAL MEDICINE

## 2022-05-17 ENCOUNTER — TELEPHONE (OUTPATIENT)
Dept: INTERNAL MEDICINE | Facility: CLINIC | Age: 52
End: 2022-05-17

## 2022-05-17 DIAGNOSIS — Z12.31 BREAST CANCER SCREENING BY MAMMOGRAM: ICD-10-CM

## 2022-05-17 PROCEDURE — 77063 BREAST TOMOSYNTHESIS BI: CPT

## 2022-05-17 PROCEDURE — 77067 SCR MAMMO BI INCL CAD: CPT

## 2022-05-26 ENCOUNTER — LAB (OUTPATIENT)
Dept: INTERNAL MEDICINE | Facility: CLINIC | Age: 52
End: 2022-05-26
Payer: MEDICAID

## 2022-07-26 DIAGNOSIS — N90.4 LICHEN SCLEROSUS OF FEMALE GENITALIA: ICD-10-CM

## 2022-07-26 RX ORDER — CLOBETASOL PROPIONATE 0.5 MG/G
CREAM TOPICAL
Qty: 30 G | Refills: 0 | OUTPATIENT
Start: 2022-07-26

## 2022-08-01 ENCOUNTER — PATIENT MESSAGE (OUTPATIENT)
Dept: INTERNAL MEDICINE | Facility: CLINIC | Age: 52
End: 2022-08-01

## 2022-08-01 DIAGNOSIS — R21 RASH: Primary | ICD-10-CM

## 2022-08-02 DIAGNOSIS — N90.4 LICHEN SCLEROSUS OF FEMALE GENITALIA: ICD-10-CM

## 2022-08-02 RX ORDER — CLOBETASOL PROPIONATE 0.5 MG/G
1 CREAM TOPICAL 2 TIMES DAILY
Qty: 45 G | Refills: 6 | Status: SHIPPED | OUTPATIENT
Start: 2022-08-02

## 2022-12-27 RX ORDER — METOPROLOL SUCCINATE 25 MG/1
25 TABLET, EXTENDED RELEASE ORAL DAILY
Qty: 90 TABLET | Refills: 3 | Status: SHIPPED | OUTPATIENT
Start: 2022-12-27 | End: 2023-01-23 | Stop reason: SDUPTHER

## 2022-12-27 NOTE — TELEPHONE ENCOUNTER
Rx Refill Note  Requested Prescriptions     Pending Prescriptions Disp Refills   • metoprolol succinate XL (TOPROL-XL) 25 MG 24 hr tablet 90 tablet 3     Sig: Take 1 tablet by mouth Daily.      Last office visit with prescribing clinician: 5/3/2022   Next office visit with prescribing clinician: Visit date not found                         Would you like a call back once the refill request has been completed: [] Yes [] No    If the office needs to give you a call back, can they leave a voicemail: [] Yes [] No    Tracy Alex RN  12/27/22, 09:22 CST

## 2023-01-23 ENCOUNTER — OFFICE VISIT (OUTPATIENT)
Dept: INTERNAL MEDICINE | Facility: CLINIC | Age: 53
End: 2023-01-23
Payer: MEDICAID

## 2023-01-23 VITALS
BODY MASS INDEX: 24.71 KG/M2 | HEIGHT: 68 IN | TEMPERATURE: 98.2 F | SYSTOLIC BLOOD PRESSURE: 118 MMHG | WEIGHT: 163 LBS | OXYGEN SATURATION: 96 % | DIASTOLIC BLOOD PRESSURE: 80 MMHG | HEART RATE: 86 BPM

## 2023-01-23 DIAGNOSIS — E03.8 OTHER SPECIFIED HYPOTHYROIDISM: ICD-10-CM

## 2023-01-23 DIAGNOSIS — E66.3 OVERWEIGHT: Primary | ICD-10-CM

## 2023-01-23 DIAGNOSIS — E78.00 PURE HYPERCHOLESTEROLEMIA: ICD-10-CM

## 2023-01-23 DIAGNOSIS — N95.1 MENOPAUSAL SYMPTOMS: ICD-10-CM

## 2023-01-23 DIAGNOSIS — I10 HYPERTENSION, UNSPECIFIED TYPE: ICD-10-CM

## 2023-01-23 DIAGNOSIS — R51.9 CHRONIC INTRACTABLE HEADACHE, UNSPECIFIED HEADACHE TYPE: ICD-10-CM

## 2023-01-23 DIAGNOSIS — G89.29 CHRONIC INTRACTABLE HEADACHE, UNSPECIFIED HEADACHE TYPE: ICD-10-CM

## 2023-01-23 PROCEDURE — 99214 OFFICE O/P EST MOD 30 MIN: CPT | Performed by: INTERNAL MEDICINE

## 2023-01-23 RX ORDER — AMITRIPTYLINE HYDROCHLORIDE 150 MG/1
150 TABLET, FILM COATED ORAL
Qty: 90 TABLET | Refills: 3 | Status: SHIPPED | OUTPATIENT
Start: 2023-01-23

## 2023-01-23 RX ORDER — SIMVASTATIN 20 MG
20 TABLET ORAL DAILY
Qty: 90 TABLET | Refills: 3 | Status: SHIPPED | OUTPATIENT
Start: 2023-01-23

## 2023-01-23 RX ORDER — LEVOTHYROXINE SODIUM 112 UG/1
112 TABLET ORAL DAILY
Qty: 90 TABLET | Refills: 3 | Status: SHIPPED | OUTPATIENT
Start: 2023-01-23

## 2023-01-23 RX ORDER — METOPROLOL SUCCINATE 25 MG/1
25 TABLET, EXTENDED RELEASE ORAL DAILY
Qty: 90 TABLET | Refills: 3 | Status: SHIPPED | OUTPATIENT
Start: 2023-01-23

## 2023-01-23 NOTE — PROGRESS NOTES
Subjective     Chief Complaint   Patient presents with   • Dyslipidemia       Hyperlipidemia  This is a chronic problem. The current episode started more than 1 year ago. The problem is controlled. Recent lipid tests were reviewed and are normal. Pertinent negatives include no chest pain, leg pain or shortness of breath. Current antihyperlipidemic treatment includes statins. The current treatment provides moderate improvement of lipids. There are no compliance problems.  Risk factors for coronary artery disease include post-menopausal and dyslipidemia.     Patient has gained weight and would like to try and loose some weight. She states that she is not able to use the phentermine due to increased heart rate side effects.   Demonstrated the use of the Ozempic pen and diuscssed side effects.     Patient's PMR from outside medical facility reviewed and noted.    Review of Systems   Constitutional: Negative for chills and fever.   HENT: Negative for congestion and rhinorrhea.    Respiratory: Negative for cough and shortness of breath.    Cardiovascular: Negative for chest pain and leg swelling.   Gastrointestinal: Negative for constipation and diarrhea.   Genitourinary: Negative for dysuria and hematuria.      Otherwise complete ROS reviewed and negative except as mentioned in the HPI.    Past Medical History:   Past Medical History:   Diagnosis Date   • Anemia    • Cyst, ovary, follicular    • Hypothyroidism    • Lichen sclerosus    • Migraine      Past Surgical History:  Past Surgical History:   Procedure Laterality Date   • BILATERAL SALPINGO OOPHORECTOMY     • BREAST BIOPSY Right    • BRONCHOSCOPY N/A 9/2/2020    Procedure: BRONCHOSCOPY;  Surgeon: Tyler Coburn MD;  Location: [facility];  Service: Cardiothoracic;  Laterality: N/A;   • COLONOSCOPY N/A 1/8/2020    The entire examined colon is normal on direct and retroflexion views; No specimens collected; Repeat 10 years   • DILATATION AND CURETTAGE   09/13/2012   • HYSTERECTOMY  10/29/2012    [facility] w/ BSO due to bleeding issues   • LAPAROSCOPY WITH LASER     • OOPHORECTOMY     • THORACOSCOPY Right 9/2/2020    Procedure: THORACOSCOPY VIDEO ASSISTED WITH RIGHT DECORTICATION; RIGHT INTERCOSTAL NERVE BLOCK;  Surgeon: Tyler Coburn MD;  Location: [facility];  Service: Cardiothoracic;  Laterality: Right;     Social History:  reports that she has never smoked. She has never used smokeless tobacco. She reports that she does not drink alcohol and does not use drugs.    Family History: family history includes Breast cancer (age of onset: 45) in her cousin; Breast cancer (age of onset: 64) in her maternal aunt; Hypertension in her mother; No Known Problems in her brother, daughter, father, maternal grandmother, paternal aunt, paternal grandmother, sister, son, and another family member; Other in her mother.      Allergies:  No Known Allergies  Medications:  Prior to Admission medications    Medication Sig Start Date End Date Taking? Authorizing Provider   amitriptyline (ELAVIL) 150 MG tablet Take 1 tablet by mouth every night at bedtime. 1/23/23  Yes Rick Corado DO   B Complex Vitamins (VITAMIN B COMPLEX PO) Take 1 tablet by mouth Daily.   Yes ProviderJeana MD   clobetasol (TEMOVATE) 0.05 % cream Apply 1 application topically to the appropriate area as directed 2 (Two) Times a Day. 8/2/22  Yes Rick Corado DO   estrogens, conjugated, (PREMARIN) 0.3 MG tablet Take 1 tablet by mouth Daily. 1/23/23  Yes Rick Corado DO   levothyroxine (SYNTHROID, LEVOTHROID) 112 MCG tablet Take 1 tablet by mouth Daily. 1/23/23  Yes Rick Corado DO   Magnesium Hydroxide (ARMANDO MILK OF MAGNESIA PO) Take 2 tablets by mouth 2 (Two) Times a Day.   Yes ProviderJeana MD   metoprolol succinate XL (TOPROL-XL) 25 MG 24 hr tablet Take 1 tablet by mouth Daily. 1/23/23  Yes Rick Corado DO   Omega-3 Fatty Acids (FISH OIL) 1200 MG capsule  "delayed-release Take 1 tablet by mouth Daily.   Yes Jeana Maciel MD   simvastatin (ZOCOR) 20 MG tablet Take 1 tablet by mouth Daily. 1/23/23  Yes Rick Corado DO   triamcinolone (KENALOG) 0.1 % cream Apply  topically to the appropriate area as directed 2 (Two) Times a Day.   Yes Jeana Maciel MD   amitriptyline (ELAVIL) 150 MG tablet Take 1 tablet by mouth every night at bedtime. 2/15/22 1/23/23 Yes Rick Corado DO   estrogens, conjugated, (PREMARIN) 0.3 MG tablet Take 1 tablet by mouth Daily. 5/3/22 1/23/23 Yes Rick Corado DO   levothyroxine (SYNTHROID, LEVOTHROID) 112 MCG tablet Take 1 tablet by mouth Daily. 5/3/22 1/23/23 Yes Rick Corado DO   metoprolol succinate XL (TOPROL-XL) 25 MG 24 hr tablet Take 1 tablet by mouth Daily. 12/27/22 1/23/23 Yes Rick Corado DO   simvastatin (ZOCOR) 20 MG tablet Take 1 tablet by mouth Daily. 2/15/22 1/23/23 Yes Rick Corado DO   Tirzepatide 2.5 MG/0.5ML solution pen-injector Inject 2.5 mg under the skin into the appropriate area as directed 1 (One) Time Per Week. 1/23/23   Rick Corado DO       Objective     Vital Signs: /80 (BP Location: Left arm, Patient Position: Sitting, Cuff Size: Adult)   Pulse 86   Temp 98.2 °F (36.8 °C) (Infrared)   Ht 172.7 cm (68\")   Wt 73.9 kg (163 lb)   LMP 08/31/2012   SpO2 96%   BMI 24.78 kg/m²   Physical Exam  Vitals reviewed.   Constitutional:       Appearance: Normal appearance.   HENT:      Head: Normocephalic and atraumatic.      Right Ear: External ear normal.      Left Ear: External ear normal.      Nose: Nose normal.   Eyes:      General: No scleral icterus.     Conjunctiva/sclera: Conjunctivae normal.   Cardiovascular:      Rate and Rhythm: Normal rate and regular rhythm.      Heart sounds: Normal heart sounds.   Pulmonary:      Effort: Pulmonary effort is normal.      Breath sounds: Normal breath sounds.   Musculoskeletal:         General: No swelling or " tenderness.      Cervical back: Normal range of motion and neck supple.   Skin:     General: Skin is warm and dry.   Neurological:      General: No focal deficit present.      Mental Status: She is alert.      Cranial Nerves: No cranial nerve deficit.   Psychiatric:         Mood and Affect: Mood normal.         Behavior: Behavior normal.       BMI is  Elevated with increased weight gain.       Results Reviewed:  Glucose   Date Value Ref Range Status   05/26/2022 87 65 - 99 mg/dL Final   09/07/2020 107 (H) 65 - 99 mg/dL Final     BUN   Date Value Ref Range Status   05/26/2022 9 6 - 24 mg/dL Final   09/07/2020 7 6 - 20 mg/dL Final     Creatinine   Date Value Ref Range Status   05/26/2022 0.78 0.57 - 1.00 mg/dL Final   09/07/2020 0.33 (L) 0.57 - 1.00 mg/dL Final     Sodium   Date Value Ref Range Status   05/26/2022 140 134 - 144 mmol/L Final   09/07/2020 134 (L) 136 - 145 mmol/L Final     Potassium   Date Value Ref Range Status   05/26/2022 4.5 3.5 - 5.2 mmol/L Final   09/07/2020 4.4 3.5 - 5.2 mmol/L Final     Chloride   Date Value Ref Range Status   05/26/2022 101 96 - 106 mmol/L Final   09/07/2020 98 98 - 107 mmol/L Final     CO2   Date Value Ref Range Status   09/07/2020 27.0 22.0 - 29.0 mmol/L Final     Total CO2   Date Value Ref Range Status   05/26/2022 26 20 - 29 mmol/L Final     Calcium   Date Value Ref Range Status   05/26/2022 9.5 8.7 - 10.2 mg/dL Final   09/07/2020 8.7 8.6 - 10.5 mg/dL Final     ALT (SGPT)   Date Value Ref Range Status   05/26/2022 18 0 - 32 IU/L Final   09/07/2020 68 (H) 1 - 33 U/L Final     AST (SGOT)   Date Value Ref Range Status   05/26/2022 18 0 - 40 IU/L Final   09/07/2020 63 (H) 1 - 32 U/L Final     WBC   Date Value Ref Range Status   05/26/2022 4.5 3.4 - 10.8 x10E3/uL Final     Hematocrit   Date Value Ref Range Status   05/26/2022 39.0 34.0 - 46.6 % Final   09/14/2020 30.8 (L) 34.0 - 46.6 % Final     Platelets   Date Value Ref Range Status   05/26/2022 280 150 - 450 x10E3/uL Final    09/14/2020 1,099 (C) 140 - 450 10*3/mm3 Final     Total Cholesterol   Date Value Ref Range Status   09/01/2020 66 0 - 200 mg/dL Final     Triglycerides   Date Value Ref Range Status   05/26/2022 131 0 - 149 mg/dL Final   09/01/2020 141 0 - 150 mg/dL Final     HDL Cholesterol   Date Value Ref Range Status   05/26/2022 78 >39 mg/dL Final   09/01/2020 17 (L) 40 - 60 mg/dL Final     LDL Chol Calc ([facility])   Date Value Ref Range Status   05/26/2022 74 0 - 99 mg/dL Final     LDL/HDL RATIO   Date Value Ref Range Status   02/02/2021 1.97  Final     Hemoglobin A1C   Date Value Ref Range Status   02/02/2021 5.90 (H) 4.80 - 5.60 % Final     Comment:     Hemoglobin A1C Ranges:  Increased Risk for Diabetes  5.7% to 6.4%  Diabetes                     >= 6.5%  Diabetic Goal                < 7.0%     09/01/2020 6.10 (H) 4.80 - 5.60 % Final        Assessment / Plan     Assessment/Plan:  1. Overweight  - Tirzepatide 2.5 MG/0.5ML solution pen-injector; Inject 2.5 mg under the skin into the appropriate area as directed 1 (One) Time Per Week.  Dispense: 0.5 mL; Refill: 5    2. Other specified hypothyroidism  - levothyroxine (SYNTHROID, LEVOTHROID) 112 MCG tablet; Take 1 tablet by mouth Daily.  Dispense: 90 tablet; Refill: 3    3. Menopausal symptoms  - estrogens, conjugated, (PREMARIN) 0.3 MG tablet; Take 1 tablet by mouth Daily.  Dispense: 90 tablet; Refill: 3    4. Hypertension, unspecified type  - metoprolol succinate XL (TOPROL-XL) 25 MG 24 hr tablet; Take 1 tablet by mouth Daily.  Dispense: 90 tablet; Refill: 3    5. Pure hypercholesterolemia  - simvastatin (ZOCOR) 20 MG tablet; Take 1 tablet by mouth Daily.  Dispense: 90 tablet; Refill: 3    6. Chronic intractable headache, unspecified headache type  - amitriptyline (ELAVIL) 150 MG tablet; Take 1 tablet by mouth every night at bedtime.  Dispense: 90 tablet; Refill: 3        Return in about 6 months (around 7/23/2023) for Recheck, Next scheduled follow up. unless patient needs to be  seen sooner or acute issues arise.    Code Status: Full    I have discussed the patient results/orders and and plan/recommendation with them at today's visit.      Rick Corado,    01/23/2023

## 2023-03-15 ENCOUNTER — OFFICE VISIT (OUTPATIENT)
Dept: INTERNAL MEDICINE | Facility: CLINIC | Age: 53
End: 2023-03-15
Payer: MEDICAID

## 2023-03-15 VITALS
OXYGEN SATURATION: 97 % | RESPIRATION RATE: 17 BRPM | TEMPERATURE: 97.5 F | BODY MASS INDEX: 24.58 KG/M2 | SYSTOLIC BLOOD PRESSURE: 117 MMHG | HEIGHT: 68 IN | DIASTOLIC BLOOD PRESSURE: 79 MMHG | WEIGHT: 162.2 LBS | HEART RATE: 86 BPM

## 2023-03-15 DIAGNOSIS — M25.551 HIP PAIN, ACUTE, RIGHT: Primary | ICD-10-CM

## 2023-03-15 DIAGNOSIS — M89.9 LYTIC LESION OF BONE ON X-RAY: ICD-10-CM

## 2023-03-15 RX ORDER — CYCLOBENZAPRINE HCL 5 MG
5 TABLET ORAL 3 TIMES DAILY PRN
Qty: 60 TABLET | Refills: 0 | Status: SHIPPED | OUTPATIENT
Start: 2023-03-15

## 2023-03-15 RX ORDER — DEXAMETHASONE SODIUM PHOSPHATE 10 MG/ML
10 INJECTION INTRAMUSCULAR; INTRAVENOUS ONCE
Status: COMPLETED | OUTPATIENT
Start: 2023-03-15 | End: 2023-03-15

## 2023-03-15 RX ORDER — DEXAMETHASONE SODIUM PHOSPHATE 10 MG/ML
10 INJECTION INTRAMUSCULAR; INTRAVENOUS ONCE
Status: DISCONTINUED | OUTPATIENT
Start: 2023-03-15 | End: 2023-03-15

## 2023-03-15 RX ORDER — IBUPROFEN 800 MG/1
800 TABLET ORAL EVERY 6 HOURS PRN
Qty: 30 TABLET | Refills: 0 | Status: SHIPPED | OUTPATIENT
Start: 2023-03-15

## 2023-03-15 RX ADMIN — DEXAMETHASONE SODIUM PHOSPHATE 10 MG: 10 INJECTION INTRAMUSCULAR; INTRAVENOUS at 11:26

## 2023-03-15 NOTE — PROGRESS NOTES
Subjective     Chief Complaint   Patient presents with   • Hip Pain     Radiates down the leg. Ongoing for over one month.        Hip Pain   The incident occurred more than 1 week ago. Incident location: none. There was no injury mechanism. The pain is present in the right hip. The quality of the pain is described as aching and burning. The pain is at a severity of 8/10. The pain is mild. The pain has been fluctuating since onset. Associated symptoms comments: Discomfort with ambulation. Does relieve some pain with rest. Does hurt to sit. . She reports no foreign bodies present. The symptoms are aggravated by movement. She has tried NSAIDs for the symptoms. The treatment provided mild relief.     Patient is a 52-year-old female who presents today with complaints of hip pain.  She states the pain radiates down her leg.  Has been ongoing for 1 month.  Denies any known injury. States the discomfort started in her right knee. And has moved into right hip.   Patient's PMR from outside medical facility reviewed and noted.    Review of Systems   Constitutional: Negative for activity change, fatigue and unexpected weight change.   HENT: Negative for mouth sores and trouble swallowing.    Eyes: Negative for discharge and visual disturbance.   Respiratory: Negative for cough and shortness of breath.    Cardiovascular: Negative for chest pain and leg swelling.   Gastrointestinal: Negative for abdominal pain, constipation, diarrhea and nausea.   Genitourinary: Negative for decreased urine volume, difficulty urinating and hematuria.   Musculoskeletal: Positive for arthralgias and gait problem. Negative for back pain.   Skin: Negative for color change and rash.   Allergic/Immunologic: Negative for environmental allergies and immunocompromised state.   Neurological: Negative for weakness and headaches.   Psychiatric/Behavioral: Negative for confusion and sleep disturbance.        Otherwise complete ROS reviewed and  negative except as mentioned in the HPI.    Past Medical History:   Past Medical History:   Diagnosis Date   • Anemia    • Cyst, ovary, follicular    • Hypothyroidism    • Lichen sclerosus    • Migraine      Past Surgical History:  Past Surgical History:   Procedure Laterality Date   • BILATERAL SALPINGO OOPHORECTOMY     • BREAST BIOPSY Right    • BRONCHOSCOPY N/A 9/2/2020    Procedure: BRONCHOSCOPY;  Surgeon: Tyler Coburn MD;  Location: [facility] OR;  Service: Cardiothoracic;  Laterality: N/A;   • COLONOSCOPY N/A 1/8/2020    The entire examined colon is normal on direct and retroflexion views; No specimens collected; Repeat 10 years   • DILATATION AND CURETTAGE  09/13/2012   • HYSTERECTOMY  10/29/2012    TLH w/ BSO due to bleeding issues   • LAPAROSCOPY WITH LASER     • OOPHORECTOMY     • THORACOSCOPY Right 9/2/2020    Procedure: THORACOSCOPY VIDEO ASSISTED WITH RIGHT DECORTICATION; RIGHT INTERCOSTAL NERVE BLOCK;  Surgeon: Tyler Coburn MD;  Location: [facility] OR;  Service: Cardiothoracic;  Laterality: Right;     Social History:  reports that she has never smoked. She has never used smokeless tobacco. She reports that she does not drink alcohol and does not use drugs.    Family History: family history includes Breast cancer (age of onset: 45) in her cousin; Breast cancer (age of onset: 64) in her maternal aunt; Hypertension in her mother; No Known Problems in her brother, daughter, father, maternal grandmother, paternal aunt, paternal grandmother, sister, son, and another family member; Other in her mother.      Allergies:  No Known Allergies  Medications:  Prior to Admission medications    Medication Sig Start Date End Date Taking? Authorizing Provider   amitriptyline (ELAVIL) 150 MG tablet Take 1 tablet by mouth every night at bedtime. 1/23/23  Yes Rick Corado, DO   B Complex Vitamins (VITAMIN B COMPLEX PO) Take 1 tablet by mouth Daily.   Yes Provider, MD Jeana   clobetasol (TEMOVATE) 0.05 % cream  "Apply 1 application topically to the appropriate area as directed 2 (Two) Times a Day. 8/2/22  Yes Rick Corado DO   estrogens, conjugated, (PREMARIN) 0.3 MG tablet Take 1 tablet by mouth Daily. 1/23/23  Yes Rick Corado DO   levothyroxine (SYNTHROID, LEVOTHROID) 112 MCG tablet Take 1 tablet by mouth Daily. 1/23/23  Yes Rick Corado DO   Magnesium Hydroxide (ARMANDO MILK OF MAGNESIA PO) Take 2 tablets by mouth 2 (Two) Times a Day.   Yes Jeana Maciel MD   metoprolol succinate XL (TOPROL-XL) 25 MG 24 hr tablet Take 1 tablet by mouth Daily. 1/23/23  Yes Rick Corado DO   Omega-3 Fatty Acids (FISH OIL) 1200 MG capsule delayed-release Take 1 capsule by mouth Daily.   Yes Jeana Maciel MD   simvastatin (ZOCOR) 20 MG tablet Take 1 tablet by mouth Daily. 1/23/23  Yes Rick Corado DO   Tirzepatide 2.5 MG/0.5ML solution pen-injector Inject 2.5 mg under the skin into the appropriate area as directed 1 (One) Time Per Week. 1/23/23  Yes Rick Corado DO   triamcinolone (KENALOG) 0.1 % cream Apply  topically to the appropriate area as directed 2 (Two) Times a Day.   Yes Jeana Maciel MD       Objective     Vital Signs: /79 (BP Location: Left arm, Patient Position: Sitting, Cuff Size: Adult)   Pulse 86   Temp 97.5 °F (36.4 °C) (Skin)   Resp 17   Ht 172.7 cm (68\")   Wt 73.6 kg (162 lb 3.2 oz)   LMP 08/31/2012   SpO2 97%   BMI 24.66 kg/m²   Physical Exam  Vitals and nursing note reviewed.   Constitutional:       General: She is not in acute distress.     Appearance: Normal appearance. She is normal weight. She is not ill-appearing.   HENT:      Head: Normocephalic and atraumatic.      Right Ear: External ear normal.      Left Ear: External ear normal.      Nose: Nose normal.      Mouth/Throat:      Mouth: Mucous membranes are moist.      Pharynx: No posterior oropharyngeal erythema.   Eyes:      General: No scleral icterus.     Extraocular Movements: " Extraocular movements intact.      Conjunctiva/sclera: Conjunctivae normal.      Pupils: Pupils are equal, round, and reactive to light.   Cardiovascular:      Rate and Rhythm: Normal rate and regular rhythm.      Pulses: Normal pulses.      Heart sounds: Normal heart sounds.   Pulmonary:      Effort: Pulmonary effort is normal. No respiratory distress.      Breath sounds: Normal breath sounds. No wheezing.   Abdominal:      General: Abdomen is flat. Bowel sounds are normal.      Palpations: Abdomen is soft.      Tenderness: There is no abdominal tenderness.   Musculoskeletal:         General: No swelling or signs of injury. Normal range of motion.      Cervical back: Normal range of motion.      Right lower leg: No edema.      Left lower leg: No edema.      Comments: tenderness noted with palpation of right hip joint and upper right buttocks.    Skin:     General: Skin is warm and dry.      Findings: No erythema or rash.   Neurological:      General: No focal deficit present.      Mental Status: She is alert and oriented to person, place, and time. Mental status is at baseline.      Motor: No weakness.      Comments: Patient has slight limp with ambulation of right side   Psychiatric:         Mood and Affect: Mood normal.         Behavior: Behavior normal.         Thought Content: Thought content normal.         Judgment: Judgment normal.         BMI is within normal parameters. No other follow-up for BMI required.        Results Reviewed:  Glucose   Date Value Ref Range Status   05/26/2022 87 65 - 99 mg/dL Final   09/07/2020 107 (H) 65 - 99 mg/dL Final     BUN   Date Value Ref Range Status   05/26/2022 9 6 - 24 mg/dL Final   09/07/2020 7 6 - 20 mg/dL Final     Creatinine   Date Value Ref Range Status   05/26/2022 0.78 0.57 - 1.00 mg/dL Final   09/07/2020 0.33 (L) 0.57 - 1.00 mg/dL Final     Sodium   Date Value Ref Range Status   05/26/2022 140 134 - 144 mmol/L Final   09/07/2020 134 (L) 136 - 145 mmol/L Final      Potassium   Date Value Ref Range Status   05/26/2022 4.5 3.5 - 5.2 mmol/L Final   09/07/2020 4.4 3.5 - 5.2 mmol/L Final     Chloride   Date Value Ref Range Status   05/26/2022 101 96 - 106 mmol/L Final   09/07/2020 98 98 - 107 mmol/L Final     CO2   Date Value Ref Range Status   09/07/2020 27.0 22.0 - 29.0 mmol/L Final     Total CO2   Date Value Ref Range Status   05/26/2022 26 20 - 29 mmol/L Final     Calcium   Date Value Ref Range Status   05/26/2022 9.5 8.7 - 10.2 mg/dL Final   09/07/2020 8.7 8.6 - 10.5 mg/dL Final     ALT (SGPT)   Date Value Ref Range Status   05/26/2022 18 0 - 32 IU/L Final   09/07/2020 68 (H) 1 - 33 U/L Final     AST (SGOT)   Date Value Ref Range Status   05/26/2022 18 0 - 40 IU/L Final   09/07/2020 63 (H) 1 - 32 U/L Final     WBC   Date Value Ref Range Status   05/26/2022 4.5 3.4 - 10.8 x10E3/uL Final     Hematocrit   Date Value Ref Range Status   05/26/2022 39.0 34.0 - 46.6 % Final   09/14/2020 30.8 (L) 34.0 - 46.6 % Final     Platelets   Date Value Ref Range Status   05/26/2022 280 150 - 450 x10E3/uL Final   09/14/2020 1,099 (C) 140 - 450 10*3/mm3 Final     Total Cholesterol   Date Value Ref Range Status   09/01/2020 66 0 - 200 mg/dL Final     Triglycerides   Date Value Ref Range Status   05/26/2022 131 0 - 149 mg/dL Final   09/01/2020 141 0 - 150 mg/dL Final     HDL Cholesterol   Date Value Ref Range Status   05/26/2022 78 >39 mg/dL Final   09/01/2020 17 (L) 40 - 60 mg/dL Final     LDL Chol Calc (NIH)   Date Value Ref Range Status   05/26/2022 74 0 - 99 mg/dL Final     LDL/HDL RATIO   Date Value Ref Range Status   02/02/2021 1.97  Final     Hemoglobin A1C   Date Value Ref Range Status   02/02/2021 5.90 (H) 4.80 - 5.60 % Final     Comment:     Hemoglobin A1C Ranges:  Increased Risk for Diabetes  5.7% to 6.4%  Diabetes                     >= 6.5%  Diabetic Goal                < 7.0%     09/01/2020 6.10 (H) 4.80 - 5.60 % Final         Assessment / Plan     Assessment/Plan:  1. Hip pain,  acute, right  - XR Hip With or Without Pelvis 2 - 3 View Right  - cyclobenzaprine (FLEXERIL) 5 MG tablet; Take 1 tablet by mouth 3 (Three) Times a Day As Needed for Muscle Spasms.  Dispense: 60 tablet; Refill: 0  - ibuprofen (ADVIL,MOTRIN) 800 MG tablet; Take 1 tablet by mouth Every 6 (Six) Hours As Needed for Mild Pain.  Dispense: 30 tablet; Refill: 0  - dexamethasone (DECADRON) injection 10 mg  - MRI Hip Right Without Contrast; Future    2. Lytic lesion of bone on x-ray  - MRI Hip Right Without Contrast; Future    advised of xray results. Will obtain MRI for further investigation    Return if symptoms worsen or fail to improve. unless patient needs to be seen sooner or acute issues arise.      I have discussed the patient results/orders and and plan/recommendation with them at today's visit.      Cyndi Mane, APRN   03/15/2023

## 2023-03-15 NOTE — PROGRESS NOTES
Id like to order an MRI of hip if she is ok. Shows a small lesion on the femoral neck, noted to likely be benign but want to assess further and look for associated femoroacetabular impingement.

## 2023-03-16 ENCOUNTER — TELEPHONE (OUTPATIENT)
Dept: INTERNAL MEDICINE | Facility: CLINIC | Age: 53
End: 2023-03-16
Payer: MEDICAID

## 2023-03-16 ENCOUNTER — TELEPHONE (OUTPATIENT)
Dept: INTERNAL MEDICINE | Facility: CLINIC | Age: 53
End: 2023-03-16

## 2023-03-16 DIAGNOSIS — F41.8 SITUATIONAL ANXIETY: Primary | ICD-10-CM

## 2023-03-16 NOTE — TELEPHONE ENCOUNTER
Caller: Heidi Fernandez    Relationship: Self    Best call back number: [phone]    What is the best time to reach you: ANYTIME    Who are you requesting to speak with (clinical staff, provider,  specific staff member): LOREN    What was the call regarding: WANTED TO FOLLOW UP ON HAVING [facility] PRESCRIBED FOR MRI VISIT ON 03.22.2023, HAD CONVERSATION WITH ANA EID ABOUT IT DURING VISIT ON 03.15.2023    Do you require a callback: YES

## 2023-03-16 NOTE — TELEPHONE ENCOUNTER
PATIENT REQUESTING WE WE CHANGE THE ORDER FOR THE MRI   FOR AN MRI OF HER RIGHT HIP  AND RESEND     GOOD CALL BACK   [phone]

## 2023-03-17 RX ORDER — ALPRAZOLAM 0.5 MG/1
0.5 TABLET ORAL 2 TIMES DAILY PRN
Qty: 2 TABLET | Refills: 1 | Status: SHIPPED | OUTPATIENT
Start: 2023-03-17

## 2023-03-21 ENCOUNTER — HOSPITAL ENCOUNTER (OUTPATIENT)
Dept: MRI IMAGING | Facility: HOSPITAL | Age: 53
Discharge: HOME | End: 2023-03-21
Payer: MEDICAID

## 2023-03-21 ENCOUNTER — TELEPHONE (OUTPATIENT)
Dept: INTERNAL MEDICINE | Facility: CLINIC | Age: 53
End: 2023-03-21

## 2023-03-21 DIAGNOSIS — M25.551 HIP PAIN, ACUTE, RIGHT: ICD-10-CM

## 2023-03-21 DIAGNOSIS — S76.019A TEAR OF GLUTEUS MINIMUS TENDON, INITIAL ENCOUNTER: Primary | ICD-10-CM

## 2023-03-21 DIAGNOSIS — M89.9 LYTIC LESION OF BONE ON X-RAY: ICD-10-CM

## 2023-03-21 PROCEDURE — 73721 MRI JNT OF LWR EXTRE W/O DYE: CPT

## 2023-03-21 NOTE — PROGRESS NOTES
She has a partial tear in her gluteus minimus along with significant inflammation this is what is causing her pain. She does have some osteoarthritis in his as well. No suspicion bone lesions.     I suggest OI referral and PT.please let me know if she is ok with this.

## 2023-03-21 NOTE — TELEPHONE ENCOUNTER
Caller: Heidi Fernandez    Relationship to patient: Self    Best call back number: [phone]    Patient is needing: PATIENT WAS CALLING TO REPLY TO VOICE MAIL SHE RECEIVED; IF SHE DOES NOT ANSWER YOU CAN LEAVE MESSAGE

## 2023-03-24 ENCOUNTER — TELEPHONE (OUTPATIENT)
Dept: INTERNAL MEDICINE | Facility: CLINIC | Age: 53
End: 2023-03-24
Payer: MEDICAID

## 2023-03-24 NOTE — TELEPHONE ENCOUNTER
Caller: Heidi Fernandez    Relationship: Self    Best call back number: [phone]    What was the call regarding: PATIENT REQUESTING TO  COPY OF HER MOST RECENT X-RAY FROM OUR OFFICE ASAP.    Do you require a callback: YES

## 2023-05-06 DIAGNOSIS — N95.1 MENOPAUSAL SYMPTOMS: ICD-10-CM

## 2023-05-06 DIAGNOSIS — E03.8 OTHER SPECIFIED HYPOTHYROIDISM: ICD-10-CM

## 2023-05-08 RX ORDER — LEVOTHYROXINE SODIUM 112 UG/1
112 TABLET ORAL DAILY
Qty: 90 TABLET | Refills: 3 | Status: SHIPPED | OUTPATIENT
Start: 2023-05-08

## 2023-07-27 ENCOUNTER — OFFICE VISIT (OUTPATIENT)
Dept: OBSTETRICS AND GYNECOLOGY | Facility: CLINIC | Age: 53
End: 2023-07-27
Payer: COMMERCIAL

## 2023-07-27 VITALS
WEIGHT: 161 LBS | HEIGHT: 67 IN | BODY MASS INDEX: 25.27 KG/M2 | DIASTOLIC BLOOD PRESSURE: 74 MMHG | SYSTOLIC BLOOD PRESSURE: 100 MMHG

## 2023-07-27 DIAGNOSIS — Z12.31 ENCOUNTER FOR SCREENING MAMMOGRAM FOR MALIGNANT NEOPLASM OF BREAST: ICD-10-CM

## 2023-07-27 DIAGNOSIS — Z78.0 POSTMENOPAUSAL: ICD-10-CM

## 2023-07-27 DIAGNOSIS — Z01.419 WOMEN'S ANNUAL ROUTINE GYNECOLOGICAL EXAMINATION: Primary | ICD-10-CM

## 2023-07-27 NOTE — PROGRESS NOTES
Chief Complaint   Patient presents with    Gynecologic Exam     Pt here for annual exam. Denies any problems today         History:  Heidi Fernandez is a 52 y.o. female who presents today for evaluation of the above problems.      Heidi Fernandez is a 52 y.o. female new to me today and here today for annual GYN examination. She is menopausal and s/p hysterectomy and has not had any recent abnormal Pap smears. She denies any vaginal discharge or bleeding. She is on hormone replacement therapy that is managed by her PCP.  Her last mammogram was in 05/2022 and read as BIRADS 2. She has no specific complaints today and denies abdominal or pelvic pain.             ROS:  Review of Systems   Constitutional: Negative.    HENT: Negative.     Eyes: Negative.    Respiratory: Negative.     Cardiovascular: Negative.    Gastrointestinal: Negative.    Endocrine: Negative.    Genitourinary: Negative.    Musculoskeletal: Negative.    Skin: Negative.    Neurological: Negative.    Psychiatric/Behavioral: Negative.       No Known Allergies  Past Medical History:   Diagnosis Date    Anemia     Cyst, ovary, follicular     Hypothyroidism     Lichen sclerosus     Migraine      Past Surgical History:   Procedure Laterality Date    BILATERAL SALPINGO OOPHORECTOMY      BREAST BIOPSY Right     BRONCHOSCOPY N/A 9/2/2020    Procedure: BRONCHOSCOPY;  Surgeon: Tyler Coburn MD;  Location:  [facility] OR;  Service: Cardiothoracic;  Laterality: N/A;    COLONOSCOPY N/A 1/8/2020    The entire examined colon is normal on direct and retroflexion views; No specimens collected; Repeat 10 years    DILATATION AND CURETTAGE  09/13/2012    HYSTERECTOMY  10/29/2012    TLH w/ BSO due to bleeding issues    LAPAROSCOPY WITH LASER      OOPHORECTOMY      THORACOSCOPY Right 9/2/2020    Procedure: THORACOSCOPY VIDEO ASSISTED WITH RIGHT DECORTICATION; RIGHT INTERCOSTAL NERVE BLOCK;  Surgeon: Tyler Coburn MD;  Location: [facility] OR;  Service: Cardiothoracic;  Laterality:  Right;     Family History   Problem Relation Age of Onset    Hypertension Mother     Other Mother         cerebrovascular accident    No Known Problems Father     No Known Problems Sister     No Known Problems Brother     Breast cancer Maternal Aunt 64    Breast cancer Cousin 45    No Known Problems Daughter     No Known Problems Son     No Known Problems Maternal Grandmother     No Known Problems Paternal Grandmother     No Known Problems Paternal Aunt     No Known Problems Other     Ovarian cancer Neg Hx     Uterine cancer Neg Hx     Colon cancer Neg Hx     Melanoma Neg Hx     Prostate cancer Neg Hx     BRCA 1/2 Neg Hx     Endometrial cancer Neg Hx     Colon polyps Neg Hx     Esophageal cancer Neg Hx     Liver cancer Neg Hx     Liver disease Neg Hx     Rectal cancer Neg Hx     Stomach cancer Neg Hx       reports that she has never smoked. She has never used smokeless tobacco. She reports that she does not drink alcohol and does not use drugs.      Current Outpatient Medications:     amitriptyline (ELAVIL) 150 MG tablet, Take 1 tablet by mouth every night at bedtime., Disp: 90 tablet, Rfl: 3    B Complex Vitamins (VITAMIN B COMPLEX PO), Take 1 tablet by mouth Daily., Disp: , Rfl:     clobetasol (TEMOVATE) 0.05 % cream, Apply 1 application topically to the appropriate area as directed 2 (Two) Times a Day., Disp: 45 g, Rfl: 6    estrogens, conjugated, (PREMARIN) 0.3 MG tablet, Take 1 tablet by mouth Daily., Disp: 90 tablet, Rfl: 3    ibuprofen (ADVIL,MOTRIN) 800 MG tablet, Take 1 tablet by mouth Every 6 (Six) Hours As Needed for Mild Pain., Disp: 30 tablet, Rfl: 0    levothyroxine (SYNTHROID, LEVOTHROID) 112 MCG tablet, Take 1 tablet by mouth Daily., Disp: 90 tablet, Rfl: 3    Magnesium Hydroxide (ARMANDO MILK OF MAGNESIA PO), Take 2 tablets by mouth 2 (Two) Times a Day., Disp: , Rfl:     metoprolol succinate XL (TOPROL-XL) 25 MG 24 hr tablet, Take 1 tablet by mouth Daily., Disp: 90 tablet, Rfl: 3    Omega-3 Fatty  "Acids (FISH OIL) 1200 MG capsule delayed-release, Take 1 capsule by mouth Daily., Disp: , Rfl:     simvastatin (ZOCOR) 20 MG tablet, Take 1 tablet by mouth Daily., Disp: 90 tablet, Rfl: 3    Tirzepatide 2.5 MG/0.5ML solution pen-injector, Inject 2.5 mg under the skin into the appropriate area as directed 1 (One) Time Per Week., Disp: 0.5 mL, Rfl: 5    triamcinolone (KENALOG) 0.1 % cream, Apply  topically to the appropriate area as directed 2 (Two) Times a Day., Disp: , Rfl:     OBJECTIVE:  /74 (BP Location: Left arm, Patient Position: Sitting)   Ht 170.2 cm (67\")   Wt 73 kg (161 lb)   LMP 08/31/2012   BMI 25.22 kg/m²    Physical Exam  Exam conducted with a chaperone present.   Constitutional:       Appearance: She is well-developed.   HENT:      Head: Normocephalic and atraumatic.   Eyes:      General: Lids are normal.      Conjunctiva/sclera: Conjunctivae normal.      Pupils: Pupils are equal, round, and reactive to light.   Neck:      Thyroid: No thyromegaly.   Cardiovascular:      Rate and Rhythm: Normal rate and regular rhythm.      Heart sounds: Normal heart sounds.   Pulmonary:      Effort: Pulmonary effort is normal.      Breath sounds: Normal breath sounds.   Chest:   Breasts:     Breasts are symmetrical.      Right: No inverted nipple, mass, nipple discharge, skin change or tenderness.      Left: No inverted nipple, mass, nipple discharge, skin change or tenderness.   Abdominal:      General: Bowel sounds are normal.      Palpations: Abdomen is soft.   Genitourinary:     Exam position: Supine.      Labia:         Right: No rash, tenderness, lesion or injury.         Left: No rash, tenderness, lesion or injury.       Vagina: No signs of injury and foreign body. No vaginal discharge, erythema, tenderness or bleeding.      Uterus: Absent.       Adnexa:         Right: No mass, tenderness or fullness.          Left: No mass, tenderness or fullness.        Rectum: Normal. No tenderness or external " hemorrhoid.      Comments: Uterus and cervix are surgically absent  Musculoskeletal:         General: Normal range of motion.      Cervical back: Normal range of motion and neck supple.   Skin:     General: Skin is warm and dry.   Neurological:      Mental Status: She is alert and oriented to person, place, and time.       Assessment/Plan    Diagnoses and all orders for this visit:    1. Women's annual routine gynecological examination (Primary)  -     TSH; Future  -     CBC (No Diff); Future  -     Comprehensive Metabolic Panel; Future  -     Lipid Panel; Future  Immunizations:      - Tetanus: Unknown or >10 years ago. Recommend to have at pharmacy or on injury.      - Influenza: recommended annually      - Pneumovax:once after age 65      - Prevnar: Once after age 65      - Zostavax: Once after age 60  Colon Cancer Screening: Due 2030  Mammogram: order placed.   PAP: s/p hysterectomy   DEXA: DEXA scan at 65  COVID vaccine information is available at vaccine.[place].gov      2. Postmenopausal  -     dexa bone density axial; Future    3. Encounter for screening mammogram for malignant neoplasm of breast  -     Mammo screening digital tomosynthesis bilateral w CAD; Future       She will schedule a mammogram.  She will followup in one year or sooner if needed.      An After Visit Summary was printed and given to the patient at discharge.  Return in about 1 year (around 7/27/2024) for Annual physical.          Caroline PEREZ 7/27/2023   Electronically signed

## 2023-08-07 ENCOUNTER — APPOINTMENT (OUTPATIENT)
Dept: BONE DENSITY | Facility: HOSPITAL | Age: 53
End: 2023-08-07
Payer: COMMERCIAL

## 2023-08-07 ENCOUNTER — HOSPITAL ENCOUNTER (OUTPATIENT)
Dept: MAMMOGRAPHY | Facility: HOSPITAL | Age: 53
Discharge: HOME | End: 2023-08-07
Admitting: NURSE PRACTITIONER
Payer: COMMERCIAL

## 2023-08-07 DIAGNOSIS — Z12.31 ENCOUNTER FOR SCREENING MAMMOGRAM FOR MALIGNANT NEOPLASM OF BREAST: ICD-10-CM

## 2023-08-07 PROCEDURE — 77063 BREAST TOMOSYNTHESIS BI: CPT

## 2023-08-07 PROCEDURE — 77067 SCR MAMMO BI INCL CAD: CPT

## 2023-09-06 ENCOUNTER — OFFICE VISIT (OUTPATIENT)
Dept: INTERNAL MEDICINE | Facility: CLINIC | Age: 53
End: 2023-09-06
Payer: COMMERCIAL

## 2023-09-06 VITALS
BODY MASS INDEX: 24.86 KG/M2 | HEIGHT: 68 IN | SYSTOLIC BLOOD PRESSURE: 104 MMHG | DIASTOLIC BLOOD PRESSURE: 71 MMHG | WEIGHT: 164 LBS | OXYGEN SATURATION: 98 % | HEART RATE: 81 BPM | TEMPERATURE: 98.4 F

## 2023-09-06 DIAGNOSIS — E03.9 ACQUIRED HYPOTHYROIDISM: Primary | ICD-10-CM

## 2023-09-06 PROCEDURE — 99214 OFFICE O/P EST MOD 30 MIN: CPT | Performed by: FAMILY MEDICINE

## 2023-09-06 NOTE — PROGRESS NOTES
"Chief Complaint  Thyroid Problem (Wants thyroid checked/)    Subjective        Heidi Fernandez presents to [facility]  History of Present Illness    Hypothyroidism  Patient presents for evaluation of thyroid function. Symptoms consist of denies fatigue, weight changes, heat/cold intolerance, bowel/skin changes or CVS symptoms.  The problem has been unchanged.  Previous thyroid studies include TSH. The hypothyroidism is due to hypothyroidism.  Patient had been in a visit to her OB/GYN.  Patient had been noted to have a significantly elevated TSH at that time.  This is uncharacteristic from the patient's prior labs we will go ahead and repeat a TSH as well as a T3 and T4.  Patient thyroid was palpated and no new nodules other or other abnormalities were found patient is also not had any new additional symptoms.      Objective   Vital Signs:  /71 (BP Location: Left arm, Patient Position: Sitting, Cuff Size: Adult)   Pulse 81   Temp 98.4 °F (36.9 °C) (Infrared)   Ht 172.7 cm (68\")   Wt 74.4 kg (164 lb)   SpO2 98%   BMI 24.94 kg/m²   Estimated body mass index is 24.94 kg/m² as calculated from the following:    Height as of this encounter: 172.7 cm (68\").    Weight as of this encounter: 74.4 kg (164 lb).       BMI is within normal parameters. No other follow-up for BMI required.      Physical Exam  Vitals and nursing note reviewed.   Constitutional:       General: She is not in acute distress.     Appearance: Normal appearance.   HENT:      Head: Normocephalic.   Eyes:      Extraocular Movements: Extraocular movements intact.      Pupils: Pupils are equal, round, and reactive to light.   Cardiovascular:      Rate and Rhythm: Normal rate and regular rhythm.      Heart sounds: Normal heart sounds. No murmur heard.  Pulmonary:      Effort: Pulmonary effort is normal. No respiratory distress.      Breath sounds: Normal breath sounds. No rhonchi or rales.   Abdominal:      General: " Abdomen is flat. Bowel sounds are normal.      Palpations: Abdomen is soft.   Neurological:      General: No focal deficit present.      Mental Status: She is alert.      Result Review :                   Assessment and Plan   Diagnoses and all orders for this visit:    1. Acquired hypothyroidism (Primary)  -     TSH  -     T4  -     T3, free             Follow Up   Return in about 6 months (around 3/6/2024).  Patient was given instructions and counseling regarding her condition or for health maintenance advice. Please see specific information pulled into the AVS if appropriate.

## 2023-09-07 DIAGNOSIS — E03.8 OTHER SPECIFIED HYPOTHYROIDISM: ICD-10-CM

## 2023-09-07 LAB
T3FREE SERPL-MCNC: 2.5 PG/ML (ref 2–4.4)
T4 SERPL-MCNC: 9.7 UG/DL (ref 4.5–12)
TSH SERPL DL<=0.005 MIU/L-ACNC: 7.21 UIU/ML (ref 0.45–4.5)

## 2023-09-07 RX ORDER — LEVOTHYROXINE SODIUM 0.12 MG/1
125 TABLET ORAL DAILY
Qty: 90 TABLET | Refills: 3 | Status: SHIPPED | OUTPATIENT
Start: 2023-09-07

## 2023-09-08 ENCOUNTER — OFFICE VISIT (OUTPATIENT)
Dept: INTERNAL MEDICINE | Facility: CLINIC | Age: 53
End: 2023-09-08
Payer: COMMERCIAL

## 2023-09-08 VITALS
BODY MASS INDEX: 24.98 KG/M2 | SYSTOLIC BLOOD PRESSURE: 111 MMHG | WEIGHT: 164.8 LBS | HEIGHT: 68 IN | OXYGEN SATURATION: 98 % | RESPIRATION RATE: 18 BRPM | HEART RATE: 53 BPM | TEMPERATURE: 97.7 F | DIASTOLIC BLOOD PRESSURE: 75 MMHG

## 2023-09-08 DIAGNOSIS — M25.531 WRIST PAIN, ACUTE, RIGHT: Primary | ICD-10-CM

## 2023-09-08 PROCEDURE — 99213 OFFICE O/P EST LOW 20 MIN: CPT

## 2023-09-08 NOTE — PROGRESS NOTES
Subjective     Chief Complaint   Patient presents with    Hand Pain     Knot on hand/wrist. Left side.        Hand Pain   Pertinent negatives include no chest pain.     The patient presents for right hand soreness.    The patient reports that she woke up with soreness to palpation on top of her right hand on Wednesday morning, 09/06/2023. The soreness worsened on Wednesday night, 09/06/2023. Yesterday, 09/07/2023, she noticed some swelling. She states that she took prescription strength ibuprofen, so the right hand pain is not as worse today, 09/08/2023. She reports that the pain radiates up her right elbow whenever she moves her right fingers and right hand. She describes the swollen area on her right hand to be a size of a half dollar red [place]. She also adds that she will have difficulty picking up an object with her right hand due to pain. She states that the right hand pain is continuously worsening. She feels that her right wrist has the worst pain. She feels that it affects her muscles when she straightens out her right fingers or moves it in a certain way. She does not feel that it is nerve pain and feels that the pain is more dull and achy. She reports that the red swollen area on her right hand has not gotten bigger in size. She just noticed the redness yesterday, 09/07/2023, noon.  Denies any known injury.   Patient's PMR from outside medical facility reviewed and noted.    Review of Systems   Constitutional:  Negative for activity change, fatigue and unexpected weight change.   HENT:  Negative for congestion, mouth sores and trouble swallowing.    Eyes:  Negative for discharge and visual disturbance.   Respiratory:  Negative for cough and shortness of breath.    Cardiovascular:  Negative for chest pain and leg swelling.   Gastrointestinal:  Negative for abdominal pain, constipation, diarrhea and nausea.   Genitourinary:  Negative for decreased urine volume, difficulty urinating and hematuria.    Musculoskeletal:  Positive for arthralgias. Negative for back pain and gait problem.   Skin:  Negative for color change and rash.   Allergic/Immunologic: Negative for environmental allergies and immunocompromised state.   Neurological:  Negative for weakness and headaches.   Psychiatric/Behavioral:  Negative for confusion and sleep disturbance.       Otherwise complete ROS reviewed and negative except as mentioned in the HPI.    Past Medical History:   Past Medical History:   Diagnosis Date    Anemia     Cyst, ovary, follicular     Hypothyroidism     Lichen sclerosus     Migraine      Past Surgical History:  Past Surgical History:   Procedure Laterality Date    BILATERAL SALPINGO OOPHORECTOMY      BREAST BIOPSY Right     BRONCHOSCOPY N/A 9/2/2020    Procedure: BRONCHOSCOPY;  Surgeon: Tyler Coburn MD;  Location:  [facility];  Service: Cardiothoracic;  Laterality: N/A;    COLONOSCOPY N/A 1/8/2020    The entire examined colon is normal on direct and retroflexion views; No specimens collected; Repeat 10 years    DILATATION AND CURETTAGE  09/13/2012    HYSTERECTOMY  10/29/2012    [facility] w/ BSO due to bleeding issues    LAPAROSCOPY WITH LASER      OOPHORECTOMY      THORACOSCOPY Right 9/2/2020    Procedure: THORACOSCOPY VIDEO ASSISTED WITH RIGHT DECORTICATION; RIGHT INTERCOSTAL NERVE BLOCK;  Surgeon: Tyler Coburn MD;  Location:  [facility];  Service: Cardiothoracic;  Laterality: Right;     Social History:  reports that she has never smoked. She has never used smokeless tobacco. She reports that she does not drink alcohol and does not use drugs.    Family History: family history includes Breast cancer (age of onset: 45) in her cousin; Breast cancer (age of onset: 64) in her maternal aunt; Hypertension in her mother; No Known Problems in her brother, daughter, father, maternal grandmother, paternal aunt, paternal grandmother, sister, son, and another family member; Other in her mother.      Allergies:  No Known  "Allergies  Medications:  Prior to Admission medications    Medication Sig Start Date End Date Taking? Authorizing Provider   amitriptyline (ELAVIL) 150 MG tablet Take 1 tablet by mouth every night at bedtime. 1/23/23  Yes Rick Corado DO   clobetasol (TEMOVATE) 0.05 % cream Apply 1 application topically to the appropriate area as directed 2 (Two) Times a Day. 8/2/22  Yes Rick Corado DO   estrogens, conjugated, (PREMARIN) 0.3 MG tablet Take 1 tablet by mouth Daily. 5/8/23  Yes Rick Corado DO   levothyroxine (SYNTHROID, LEVOTHROID) 125 MCG tablet Take 1 tablet by mouth Daily. 9/7/23  Yes Ponce Biswas MD   Magnesium Hydroxide (ARMANDO MILK OF MAGNESIA PO) Take 2 tablets by mouth 2 (Two) Times a Day.   Yes Jeana Maciel MD   metoprolol succinate XL (TOPROL-XL) 25 MG 24 hr tablet Take 1 tablet by mouth Daily. 1/23/23  Yes Rick Corado DO   simvastatin (ZOCOR) 20 MG tablet Take 1 tablet by mouth Daily. 1/23/23  Yes Rick Corado DO   triamcinolone (KENALOG) 0.1 % cream Apply  topically to the appropriate area as directed 2 (Two) Times a Day.   Yes ProviderJeana MD         Objective     Vital Signs: /75 (BP Location: Right arm, Patient Position: Sitting, Cuff Size: Adult)   Pulse 53   Temp 97.7 °F (36.5 °C) (Skin)   Resp 18   Ht 172.7 cm (68\")   Wt 74.8 kg (164 lb 12.8 oz)   LMP 08/31/2012   SpO2 98%   BMI 25.06 kg/m²   Physical Exam  Vitals reviewed.   Constitutional:       General: She is not in acute distress.     Appearance: Normal appearance. She is not ill-appearing.   HENT:      Head: Normocephalic and atraumatic.      Right Ear: External ear normal.      Left Ear: External ear normal.      Nose: Nose normal.      Mouth/Throat:      Mouth: Mucous membranes are moist.      Pharynx: No posterior oropharyngeal erythema.   Eyes:      General: No scleral icterus.     Extraocular Movements: Extraocular movements intact.      " Conjunctiva/sclera: Conjunctivae normal.      Pupils: Pupils are equal, round, and reactive to light.   Cardiovascular:      Rate and Rhythm: Normal rate and regular rhythm.      Pulses: Normal pulses.      Heart sounds: Normal heart sounds.   Pulmonary:      Effort: Pulmonary effort is normal. No respiratory distress.      Breath sounds: Normal breath sounds. No wheezing.   Abdominal:      General: Abdomen is flat. Bowel sounds are normal.      Palpations: Abdomen is soft.      Tenderness: There is no abdominal tenderness.   Musculoskeletal:         General: Tenderness present.      Cervical back: Normal range of motion.      Right lower leg: No edema.      Left lower leg: No edema.      Comments: Patient reports discomfort with extending right hand.    Skin:     General: Skin is warm and dry.      Findings: No erythema or rash.      Comments: Half dollar size erythremic area noted to right hand, reported tenderness with palpation.    Neurological:      General: No focal deficit present.      Mental Status: She is alert and oriented to person, place, and time. Mental status is at baseline.      Motor: No weakness.   Psychiatric:         Mood and Affect: Mood normal.         Behavior: Behavior normal.         Thought Content: Thought content normal.         Judgment: Judgment normal.         Results Reviewed:  Glucose   Date Value Ref Range Status   08/07/2023 92 65 - 99 mg/dL Final   09/07/2020 107 (H) 65 - 99 mg/dL Final     BUN   Date Value Ref Range Status   08/07/2023 10 6 - 20 mg/dL Final   09/07/2020 7 6 - 20 mg/dL Final     Creatinine   Date Value Ref Range Status   08/07/2023 0.87 0.57 - 1.00 mg/dL Final   09/07/2020 0.33 (L) 0.57 - 1.00 mg/dL Final     Sodium   Date Value Ref Range Status   08/07/2023 142 136 - 145 mmol/L Final   09/07/2020 134 (L) 136 - 145 mmol/L Final     Potassium   Date Value Ref Range Status   08/07/2023 4.1 3.5 - 5.2 mmol/L Final   09/07/2020 4.4 3.5 - 5.2 mmol/L Final     Chloride    Date Value Ref Range Status   08/07/2023 102 98 - 107 mmol/L Final   09/07/2020 98 98 - 107 mmol/L Final     CO2   Date Value Ref Range Status   09/07/2020 27.0 22.0 - 29.0 mmol/L Final     Total CO2   Date Value Ref Range Status   08/07/2023 30.7 (H) 22.0 - 29.0 mmol/L Final     Calcium   Date Value Ref Range Status   08/07/2023 9.5 8.6 - 10.5 mg/dL Final   09/07/2020 8.7 8.6 - 10.5 mg/dL Final     ALT (SGPT)   Date Value Ref Range Status   08/07/2023 15 1 - 33 U/L Final   09/07/2020 68 (H) 1 - 33 U/L Final     AST (SGOT)   Date Value Ref Range Status   08/07/2023 17 1 - 32 U/L Final   09/07/2020 63 (H) 1 - 32 U/L Final     WBC   Date Value Ref Range Status   08/07/2023 4.22 3.40 - 10.80 10*3/mm3 Final     Hematocrit   Date Value Ref Range Status   08/07/2023 36.7 34.0 - 46.6 % Final   09/14/2020 30.8 (L) 34.0 - 46.6 % Final     Platelets   Date Value Ref Range Status   08/07/2023 268 140 - 450 10*3/mm3 Final   09/14/2020 1,099 (C) 140 - 450 10*3/mm3 Final     Total Cholesterol   Date Value Ref Range Status   09/01/2020 66 0 - 200 mg/dL Final     Triglycerides   Date Value Ref Range Status   08/07/2023 150 0 - 150 mg/dL Final   09/01/2020 141 0 - 150 mg/dL Final     HDL Cholesterol   Date Value Ref Range Status   08/07/2023 72 (H) 40 - 60 mg/dL Final   09/01/2020 17 (L) 40 - 60 mg/dL Final     LDL Chol Calc (NIH)   Date Value Ref Range Status   08/07/2023 77 0 - 100 mg/dL Final     LDL/HDL RATIO   Date Value Ref Range Status   02/02/2021 1.97  Final     Hemoglobin A1C   Date Value Ref Range Status   02/02/2021 5.90 (H) 4.80 - 5.60 % Final     Comment:     Hemoglobin A1C Ranges:  Increased Risk for Diabetes  5.7% to 6.4%  Diabetes                     >= 6.5%  Diabetic Goal                < 7.0%     09/01/2020 6.10 (H) 4.80 - 5.60 % Final         Assessment / Plan     Assessment/Plan:  1. Wrist pain, acute, right  - XR Wrist 3+ View Right (In Office)      Right hand soreness  - The patient will be ordered a right  hand x-ray.  - The patient is advised to continue taking the ibuprofen (Motrin) throughout the weekend.  - The patient is advised to use some ice on her right wrist.  - The patient is advised to let the office know with any worsening right hand soreness or any other problems.      Return if symptoms worsen or fail to improve. unless patient needs to be seen sooner or acute issues arise.      I have discussed the patient results/orders and and plan/recommendation with them at today's visit.      ANA Rubio   09/08/2023    Transcribed from ambient dictation for ANA Rubio by Sourav Resendiz.  09/08/23   12:22 CDT    Patient or patient representative verbalized consent to the visit recording.  I have personally performed the services described in this document as transcribed by the above individual, and it is both accurate and complete.

## 2023-10-06 ENCOUNTER — TELEPHONE (OUTPATIENT)
Dept: OBSTETRICS AND GYNECOLOGY | Facility: CLINIC | Age: 53
End: 2023-10-06
Payer: COMMERCIAL

## 2023-11-17 ENCOUNTER — LAB (OUTPATIENT)
Dept: INTERNAL MEDICINE | Facility: CLINIC | Age: 53
End: 2023-11-17
Payer: COMMERCIAL

## 2023-12-11 ENCOUNTER — TELEPHONE (OUTPATIENT)
Dept: OBSTETRICS AND GYNECOLOGY | Facility: CLINIC | Age: 53
End: 2023-12-11
Payer: COMMERCIAL

## 2024-01-15 DIAGNOSIS — N90.4 LICHEN SCLEROSUS OF FEMALE GENITALIA: ICD-10-CM

## 2024-01-15 NOTE — TELEPHONE ENCOUNTER
Caller: Heidi Fernandez    Relationship: Self    Best call back number: [phone]     Requested Prescriptions:   Requested Prescriptions     Pending Prescriptions Disp Refills    clobetasol (TEMOVATE) 0.05 % cream 45 g 6     Sig: Apply 1 application  topically to the appropriate area as directed 2 (Two) Times a Day.        Pharmacy where request should be sent: [address] [phone] [facility] [phone]      Last office visit with prescribing clinician: 9/6/2023   Last telemedicine visit with prescribing clinician: Visit date not found   Next office visit with prescribing clinician: 3/6/2024         Does the patient have less than a 3 day supply:  [x] Yes  [] No    Would you like a call back once the refill request has been completed: [] Yes [x] No        Angela Richard, Jerry Rep   01/15/24 12:20 CST

## 2024-01-16 RX ORDER — CLOBETASOL PROPIONATE 0.5 MG/G
1 CREAM TOPICAL 2 TIMES DAILY
Qty: 45 G | Refills: 6 | Status: SHIPPED | OUTPATIENT
Start: 2024-01-16

## 2024-01-16 NOTE — TELEPHONE ENCOUNTER
Rx Refill Note  Requested Prescriptions     Pending Prescriptions Disp Refills    clobetasol (TEMOVATE) 0.05 % cream 45 g 6     Sig: Apply 1 application  topically to the appropriate area as directed 2 (Two) Times a Day.      Last office visit with prescribing clinician: 9/6/2023   Last telemedicine visit with prescribing clinician: Visit date not found   Next office visit with prescribing clinician: 3/6/2024                         Would you like a call back once the refill request has been completed: [] Yes [] No    If the office needs to give you a call back, can they leave a voicemail: [] Yes [] No    Tracy Alex RN  01/16/24, 07:09 CST

## 2024-02-07 ENCOUNTER — OFFICE VISIT (OUTPATIENT)
Dept: INTERNAL MEDICINE | Facility: CLINIC | Age: 54
End: 2024-02-07
Payer: COMMERCIAL

## 2024-02-07 VITALS
OXYGEN SATURATION: 98 % | RESPIRATION RATE: 16 BRPM | BODY MASS INDEX: 25.01 KG/M2 | HEIGHT: 68 IN | DIASTOLIC BLOOD PRESSURE: 74 MMHG | SYSTOLIC BLOOD PRESSURE: 123 MMHG | WEIGHT: 165 LBS | HEART RATE: 77 BPM | TEMPERATURE: 98 F

## 2024-02-07 DIAGNOSIS — E03.9 ACQUIRED HYPOTHYROIDISM: Primary | ICD-10-CM

## 2024-02-07 DIAGNOSIS — R51.9 CHRONIC INTRACTABLE HEADACHE, UNSPECIFIED HEADACHE TYPE: ICD-10-CM

## 2024-02-07 DIAGNOSIS — G89.29 CHRONIC INTRACTABLE HEADACHE, UNSPECIFIED HEADACHE TYPE: ICD-10-CM

## 2024-02-07 DIAGNOSIS — I10 HYPERTENSION, UNSPECIFIED TYPE: ICD-10-CM

## 2024-02-07 DIAGNOSIS — N95.1 MENOPAUSAL SYMPTOMS: ICD-10-CM

## 2024-02-07 DIAGNOSIS — E78.00 PURE HYPERCHOLESTEROLEMIA: ICD-10-CM

## 2024-02-07 PROBLEM — J90 PLEURAL EFFUSION ON RIGHT: Status: RESOLVED | Noted: 2020-09-01 | Resolved: 2024-02-07

## 2024-02-07 PROBLEM — J98.4 PNEUMONITIS: Status: RESOLVED | Noted: 2020-09-01 | Resolved: 2024-02-07

## 2024-02-07 PROBLEM — D72.829 LEUKOCYTOSIS: Status: RESOLVED | Noted: 2020-09-03 | Resolved: 2024-02-07

## 2024-02-07 PROBLEM — Z12.11 COLON CANCER SCREENING: Status: RESOLVED | Noted: 2019-12-19 | Resolved: 2024-02-07

## 2024-02-07 PROBLEM — K85.90 ACUTE PANCREATITIS: Status: RESOLVED | Noted: 2020-08-31 | Resolved: 2024-02-07

## 2024-02-07 PROBLEM — J86.9 EMPYEMA, RIGHT: Status: RESOLVED | Noted: 2020-09-03 | Resolved: 2024-02-07

## 2024-02-07 PROBLEM — R74.8 ELEVATED LIVER ENZYMES: Status: RESOLVED | Noted: 2020-09-01 | Resolved: 2024-02-07

## 2024-02-07 PROCEDURE — 99214 OFFICE O/P EST MOD 30 MIN: CPT | Performed by: FAMILY MEDICINE

## 2024-02-07 RX ORDER — SIMVASTATIN 20 MG
20 TABLET ORAL DAILY
Qty: 90 TABLET | Refills: 3 | Status: SHIPPED | OUTPATIENT
Start: 2024-02-07

## 2024-02-07 RX ORDER — METOPROLOL SUCCINATE 25 MG/1
25 TABLET, EXTENDED RELEASE ORAL DAILY
Qty: 90 TABLET | Refills: 3 | Status: SHIPPED | OUTPATIENT
Start: 2024-02-07

## 2024-02-07 RX ORDER — AMITRIPTYLINE HYDROCHLORIDE 150 MG/1
150 TABLET ORAL
Qty: 90 TABLET | Refills: 3 | Status: SHIPPED | OUTPATIENT
Start: 2024-02-07

## 2024-02-07 NOTE — PROGRESS NOTES
"Chief Complaint  Hypothyroidism (Follow up. )    Subjective        Heidi Fernandez presents to [facility]  History of Present Illness    Heidi Fernandez is a 53 y.o. female who presents for a routine visit at this time.  Patient reports that she has been doing well overall she just needs refills on several medications.  Her hyperlipidemia remains well-controlled with her current dose of medication and her hypertension likewise continues to do well with current dosages.  She is due to recheck her thyroid in a couple months and would like me to go ahead and put the orders in for this at this time.    Patient states that her headaches do very well with her amitriptyline as a preventative dosage is happy with her current dosage and reports no side effects.    Patient has a history of menopausal symptoms and states that her hormone replacement therapy continues to go well with estrogen as noted no side effects of the medication    Objective   Vital Signs:  /74 (BP Location: Left arm, Patient Position: Sitting, Cuff Size: Adult)   Pulse 77   Temp 98 °F (36.7 °C) (Skin)   Resp 16   Ht 172.7 cm (68\")   Wt 74.8 kg (165 lb)   SpO2 98%   BMI 25.09 kg/m²   Estimated body mass index is 25.09 kg/m² as calculated from the following:    Height as of this encounter: 172.7 cm (68\").    Weight as of this encounter: 74.8 kg (165 lb).       BMI is within normal parameters. No other follow-up for BMI required.      Past Medical History:   Diagnosis Date    Anemia     Cyst, ovary, follicular     Hypothyroidism     Lichen sclerosus     Migraine     Pancreatitis Sept 2020    Scoliosis 1978       Past Surgical History:   Procedure Laterality Date    BILATERAL SALPINGO OOPHORECTOMY      BREAST BIOPSY Right     BRONCHOSCOPY N/A 9/2/2020    Procedure: BRONCHOSCOPY;  Surgeon: Tyler Coburn MD;  Location: [facility];  Service: Cardiothoracic;  Laterality: N/A;    COLONOSCOPY N/A 1/8/2020    The entire " examined colon is normal on direct and retroflexion views; No specimens collected; Repeat 10 years    DILATATION AND CURETTAGE  09/13/2012    HYSTERECTOMY  10/29/2012    [facility] w/ BSO due to bleeding issues    LAPAROSCOPY WITH LASER      OOPHORECTOMY      THORACOSCOPY Right 9/2/2020    Procedure: THORACOSCOPY VIDEO ASSISTED WITH RIGHT DECORTICATION; RIGHT INTERCOSTAL NERVE BLOCK;  Surgeon: Tyler Coburn MD;  Location: [facility];  Service: Cardiothoracic;  Laterality: Right;       Social History     Tobacco Use    Smoking status: Never    Smokeless tobacco: Never   Vaping Use    Vaping Use: Never used   Substance Use Topics    Alcohol use: No    Drug use: No       Family History   Problem Relation Age of Onset    Hypertension Mother     Other Mother         cerebrovascular accident    Arthritis Mother     Heart disease Mother     Stroke Mother     No Known Problems Father     No Known Problems Sister     No Known Problems Brother     Breast cancer Maternal Aunt 64    Breast cancer Cousin 45    No Known Problems Daughter     No Known Problems Son     No Known Problems Maternal Grandmother     No Known Problems Paternal Grandmother     No Known Problems Paternal Aunt     No Known Problems Other     Ovarian cancer Neg Hx     Uterine cancer Neg Hx     Colon cancer Neg Hx     Melanoma Neg Hx     Prostate cancer Neg Hx     BRCA 1/2 Neg Hx     Endometrial cancer Neg Hx     Colon polyps Neg Hx     Esophageal cancer Neg Hx     Liver cancer Neg Hx     Liver disease Neg Hx     Rectal cancer Neg Hx     Stomach cancer Neg Hx        Allergies as of 02/07/2024    (No Known Allergies)         Current Outpatient Medications:     amitriptyline (ELAVIL) 150 MG tablet, Take 1 tablet by mouth every night at bedtime., Disp: 90 tablet, Rfl: 3    clobetasol (TEMOVATE) 0.05 % cream, Apply 1 application  topically to the appropriate area as directed 2 (Two) Times a Day., Disp: 45 g, Rfl: 6    estrogens, conjugated, (PREMARIN) 0.3 MG tablet,  Take 1 tablet by mouth Daily., Disp: 90 tablet, Rfl: 3    levothyroxine (SYNTHROID, LEVOTHROID) 125 MCG tablet, Take 1 tablet by mouth Daily., Disp: 90 tablet, Rfl: 3    Magnesium Hydroxide (ARMANDO MILK OF MAGNESIA PO), Take 2 tablets by mouth 2 (Two) Times a Day., Disp: , Rfl:     metoprolol succinate XL (TOPROL-XL) 25 MG 24 hr tablet, Take 1 tablet by mouth Daily., Disp: 90 tablet, Rfl: 3    simvastatin (ZOCOR) 20 MG tablet, Take 1 tablet by mouth Daily., Disp: 90 tablet, Rfl: 3    triamcinolone (KENALOG) 0.1 % cream, Apply  topically to the appropriate area as directed 2 (Two) Times a Day., Disp: , Rfl:     Review of systems   negative unless otherwise specified above in HPI      Physical Exam  Vitals and nursing note reviewed.   Constitutional:       General: She is not in acute distress.     Appearance: Normal appearance.   HENT:      Head: Normocephalic.   Eyes:      Extraocular Movements: Extraocular movements intact.      Pupils: Pupils are equal, round, and reactive to light.   Cardiovascular:      Rate and Rhythm: Normal rate and regular rhythm.      Heart sounds: Normal heart sounds. No murmur heard.  Pulmonary:      Effort: Pulmonary effort is normal. No respiratory distress.      Breath sounds: Normal breath sounds. No rhonchi or rales.   Abdominal:      General: Abdomen is flat. Bowel sounds are normal.      Palpations: Abdomen is soft.   Neurological:      General: No focal deficit present.      Mental Status: She is alert.        Result Review :  The following data was reviewed by: Ponce Biswas MD on 02/07/2024:  Common labs          8/7/2023    09:04   Common Labs   Glucose 92    BUN 10    Creatinine 0.87    Sodium 142    Potassium 4.1    Chloride 102    Calcium 9.5    Total Protein 6.6    Albumin 4.2    Total Bilirubin <0.2    Alkaline Phosphatase 84    AST (SGOT) 17    ALT (SGPT) 15    WBC 4.22    Hemoglobin 12.1    Hematocrit 36.7    Platelets 268    Total Cholesterol 174     Triglycerides 150    HDL Cholesterol 72    LDL Cholesterol  77                        Assessment and Plan   Diagnoses and all orders for this visit:    1. Acquired hypothyroidism (Primary)  -     TSH; Future    2. Chronic intractable headache, unspecified headache type  -     amitriptyline (ELAVIL) 150 MG tablet; Take 1 tablet by mouth every night at bedtime.  Dispense: 90 tablet; Refill: 3    3. Menopausal symptoms  Comments:  Doing well with Premarin 0.3 mg tablets and wants to remain on them.  Refill sent to pharmacy.  Orders:  -     estrogens, conjugated, (PREMARIN) 0.3 MG tablet; Take 1 tablet by mouth Daily.  Dispense: 90 tablet; Refill: 3    4. Hypertension, unspecified type  -     metoprolol succinate XL (TOPROL-XL) 25 MG 24 hr tablet; Take 1 tablet by mouth Daily.  Dispense: 90 tablet; Refill: 3    5. Pure hypercholesterolemia  -     simvastatin (ZOCOR) 20 MG tablet; Take 1 tablet by mouth Daily.  Dispense: 90 tablet; Refill: 3               Follow Up   Return in about 8 months (around 10/7/2024).  Patient was given instructions and counseling regarding her condition or for health maintenance advice. Please see specific information pulled into the AVS if appropriate.     Signed by:    Ponce Biswas MD Date: 02/07/24

## 2024-07-29 ENCOUNTER — OFFICE VISIT (OUTPATIENT)
Dept: OBSTETRICS AND GYNECOLOGY | Age: 54
End: 2024-07-29
Payer: COMMERCIAL

## 2024-07-29 VITALS
WEIGHT: 160 LBS | DIASTOLIC BLOOD PRESSURE: 74 MMHG | HEIGHT: 68 IN | SYSTOLIC BLOOD PRESSURE: 122 MMHG | BODY MASS INDEX: 24.25 KG/M2

## 2024-07-29 DIAGNOSIS — Z01.419 WOMEN'S ANNUAL ROUTINE GYNECOLOGICAL EXAMINATION: Primary | ICD-10-CM

## 2024-07-29 DIAGNOSIS — Z12.31 ENCOUNTER FOR SCREENING MAMMOGRAM FOR MALIGNANT NEOPLASM OF BREAST: ICD-10-CM

## 2024-07-29 DIAGNOSIS — Z13.820 SCREENING FOR OSTEOPOROSIS: ICD-10-CM

## 2024-07-29 DIAGNOSIS — Z78.0 MENOPAUSE: ICD-10-CM

## 2024-07-29 PROCEDURE — 99459 PELVIC EXAMINATION: CPT | Performed by: NURSE PRACTITIONER

## 2024-07-29 PROCEDURE — 99396 PREV VISIT EST AGE 40-64: CPT | Performed by: NURSE PRACTITIONER

## 2024-07-29 NOTE — PROGRESS NOTES
Chief Complaint   Patient presents with    Gynecologic Exam     Patient here for annual exam.  Mammogram 08/07/2023 BIRADS Category 1 - Normal  No other questions or concerns.         History:  Heidi Fernandez is a 53 y.o. female who presents today for evaluation of the above problems.      Heidi Fernandez is a 53 y.o. female here today for annual GYN examination. She is menopausal and s/p hysterectomy.  She denies any vaginal discharge or bleeding. She is on hormone replacement therapy.  Her last mammogram was in 08/2023 and read as BIRADS 1. She has no specific complaints today and denies abdominal or pelvic pain. No new sexual partners. Declines STD screening.        ROS:  Review of Systems   Constitutional: Negative.    HENT: Negative.     Eyes: Negative.    Respiratory: Negative.     Cardiovascular: Negative.    Gastrointestinal: Negative.    Endocrine: Negative.    Genitourinary: Negative.    Musculoskeletal: Negative.    Skin: Negative.    Neurological: Negative.    Psychiatric/Behavioral: Negative.         No Known Allergies  Past Medical History:   Diagnosis Date    Anemia     Cyst, ovary, follicular     Hypothyroidism     Lichen sclerosus     Migraine     Pancreatitis Sept 2020    Scoliosis 1978     Past Surgical History:   Procedure Laterality Date    BILATERAL SALPINGO OOPHORECTOMY      BREAST BIOPSY Right     BRONCHOSCOPY N/A 9/2/2020    Procedure: BRONCHOSCOPY;  Surgeon: Tyler Coburn MD;  Location: [facility] OR;  Service: Cardiothoracic;  Laterality: N/A;    COLONOSCOPY N/A 1/8/2020    The entire examined colon is normal on direct and retroflexion views; No specimens collected; Repeat 10 years    DILATATION AND CURETTAGE  09/13/2012    HYSTERECTOMY  10/29/2012    TLH w/ BSO due to bleeding issues    LAPAROSCOPY WITH LASER      OOPHORECTOMY      THORACOSCOPY Right 9/2/2020    Procedure: THORACOSCOPY VIDEO ASSISTED WITH RIGHT DECORTICATION; RIGHT INTERCOSTAL NERVE BLOCK;  Surgeon: Tyler Coburn MD;   Location:  [facility] OR;  Service: Cardiothoracic;  Laterality: Right;     Family History   Problem Relation Age of Onset    Hypertension Mother     Other Mother         cerebrovascular accident    Arthritis Mother     Heart disease Mother     Stroke Mother     No Known Problems Father     No Known Problems Sister     No Known Problems Brother     Breast cancer Maternal Aunt 64    Breast cancer Cousin 45    No Known Problems Daughter     No Known Problems Son     No Known Problems Maternal Grandmother     No Known Problems Paternal Grandmother     No Known Problems Paternal Aunt     No Known Problems Other     Ovarian cancer Neg Hx     Uterine cancer Neg Hx     Colon cancer Neg Hx     Melanoma Neg Hx     Prostate cancer Neg Hx     BRCA 1/2 Neg Hx     Endometrial cancer Neg Hx     Colon polyps Neg Hx     Esophageal cancer Neg Hx     Liver cancer Neg Hx     Liver disease Neg Hx     Rectal cancer Neg Hx     Stomach cancer Neg Hx       reports that she has never smoked. She has never used smokeless tobacco. She reports that she does not drink alcohol and does not use drugs.      Current Outpatient Medications:     amitriptyline (ELAVIL) 150 MG tablet, Take 1 tablet by mouth every night at bedtime., Disp: 90 tablet, Rfl: 3    clobetasol (TEMOVATE) 0.05 % cream, Apply 1 application  topically to the appropriate area as directed 2 (Two) Times a Day., Disp: 45 g, Rfl: 6    estrogens, conjugated, (PREMARIN) 0.3 MG tablet, Take 1 tablet by mouth Daily., Disp: 90 tablet, Rfl: 3    levothyroxine (SYNTHROID, LEVOTHROID) 125 MCG tablet, Take 1 tablet by mouth Daily., Disp: 90 tablet, Rfl: 3    Magnesium Hydroxide (ARMANDO MILK OF MAGNESIA PO), Take 2 tablets by mouth 2 (Two) Times a Day., Disp: , Rfl:     metoprolol succinate XL (TOPROL-XL) 25 MG 24 hr tablet, Take 1 tablet by mouth Daily., Disp: 90 tablet, Rfl: 3    simvastatin (ZOCOR) 20 MG tablet, Take 1 tablet by mouth Daily., Disp: 90 tablet, Rfl: 3    triamcinolone (KENALOG)  "0.1 % cream, Apply  topically to the appropriate area as directed 2 (Two) Times a Day., Disp: , Rfl:     OBJECTIVE:  /74   Ht 172.7 cm (68\")   Wt 72.6 kg (160 lb)   LMP 08/31/2012   BMI 24.33 kg/m²    Physical Exam  Exam conducted with a chaperone present.   Constitutional:       Appearance: She is well-developed.   HENT:      Head: Normocephalic and atraumatic.   Eyes:      General: Lids are normal.      Conjunctiva/sclera: Conjunctivae normal.      Pupils: Pupils are equal, round, and reactive to light.   Neck:      Thyroid: No thyromegaly.   Cardiovascular:      Rate and Rhythm: Normal rate and regular rhythm.      Heart sounds: Normal heart sounds.   Pulmonary:      Effort: Pulmonary effort is normal.      Breath sounds: Normal breath sounds.   Chest:   Breasts:     Breasts are symmetrical.      Right: No inverted nipple, mass, nipple discharge, skin change or tenderness.      Left: No inverted nipple, mass, nipple discharge, skin change or tenderness.   Abdominal:      General: Bowel sounds are normal.      Palpations: Abdomen is soft.   Genitourinary:     Exam position: Supine.      Labia:         Right: No rash, tenderness, lesion or injury.         Left: No rash, tenderness, lesion or injury.       Vagina: No signs of injury and foreign body. No vaginal discharge, erythema, tenderness or bleeding.      Uterus: Absent.       Adnexa:         Right: No mass, tenderness or fullness.          Left: No mass, tenderness or fullness.        Rectum: Normal. No tenderness or external hemorrhoid.   Musculoskeletal:         General: Normal range of motion.      Cervical back: Normal range of motion and neck supple.   Skin:     General: Skin is warm and dry.   Neurological:      Mental Status: She is alert and oriented to person, place, and time.         Assessment/Plan    Diagnoses and all orders for this visit:    1. Women's annual routine gynecological examination (Primary)  Immunizations:      - Tetanus: " Unknown or >10 years ago. Recommend to have at pharmacy or on injury.      - Influenza: recommended annually      - Pneumovax:once after age 65      - Prevnar: Once after age 65      - Zostavax: Once after age 60  Colon Cancer Screening: Due 2030  Mammogram: order placed  PAP: s/p hysterectomy   DEXA: order placed   COVID vaccine information is available at vaccine.[place].gov      2. Encounter for screening mammogram for malignant neoplasm of breast  -     Mammo screening digital tomosynthesis bilateral w CAD     She will schedule a mammogram.  She will followup in one year or sooner if needed.        Diagnosis Plan   1. Women's annual routine gynecological examination        2. Encounter for screening mammogram for malignant neoplasm of breast  Mammo screening digital tomosynthesis bilateral w CAD      3. Menopause  dexa bone density axial      4. Screening for osteoporosis  dexa bone density axial            An After Visit Summary was printed and given to the patient at discharge.  Return in about 1 year (around 7/29/2025) for Annual physical.          Caroline Hoyt APRN 7/29/2024   Electronically signed

## 2024-08-12 LAB
NCCN CRITERIA FLAG: NORMAL
TYRER CUZICK SCORE: 11.4

## 2024-08-14 ENCOUNTER — TELEPHONE (OUTPATIENT)
Dept: OBSTETRICS AND GYNECOLOGY | Age: 54
End: 2024-08-14

## 2024-08-14 DIAGNOSIS — Z00.00 PREVENTATIVE HEALTH CARE: Primary | ICD-10-CM

## 2024-08-14 NOTE — TELEPHONE ENCOUNTER
AMBROCIO PARKER    [phone]    PT IS REQUESTING LAB ORDERS (SAME AS 8/7/24) TO BE PLACED SO SHE CAN HAVE DRAWN TOMORROW

## 2024-08-14 NOTE — TELEPHONE ENCOUNTER
Annual labs are recommended to be drawn with primary care provider. She is due for thyroid with his office in October. I can place her other labs for her and send the results to Dr. Biswas if she would like.

## 2024-08-15 ENCOUNTER — HOSPITAL ENCOUNTER (OUTPATIENT)
Dept: MAMMOGRAPHY | Facility: HOSPITAL | Age: 54
Discharge: HOME | End: 2024-08-15
Admitting: NURSE PRACTITIONER
Payer: COMMERCIAL

## 2024-08-15 PROCEDURE — 77067 SCR MAMMO BI INCL CAD: CPT

## 2024-08-15 PROCEDURE — 77063 BREAST TOMOSYNTHESIS BI: CPT

## 2024-08-16 LAB
ALBUMIN SERPL-MCNC: 4.1 G/DL (ref 3.5–5.2)
ALBUMIN/GLOB SERPL: 1.8 G/DL
ALP SERPL-CCNC: 93 U/L (ref 39–117)
ALT SERPL-CCNC: 10 U/L (ref 1–33)
AST SERPL-CCNC: 15 U/L (ref 1–32)
BILIRUB SERPL-MCNC: <0.2 MG/DL (ref 0–1.2)
BUN SERPL-MCNC: 8 MG/DL (ref 6–20)
BUN/CREAT SERPL: 11.6 (ref 7–25)
CALCIUM SERPL-MCNC: 9.1 MG/DL (ref 8.6–10.5)
CHLORIDE SERPL-SCNC: 104 MMOL/L (ref 98–107)
CHOLEST SERPL-MCNC: 182 MG/DL (ref 0–200)
CO2 SERPL-SCNC: 30.3 MMOL/L (ref 22–29)
CREAT SERPL-MCNC: 0.69 MG/DL (ref 0.57–1)
EGFRCR SERPLBLD CKD-EPI 2021: 102.6 ML/MIN/1.73
ERYTHROCYTE [DISTWIDTH] IN BLOOD BY AUTOMATED COUNT: 13 % (ref 12.3–15.4)
GLOBULIN SER CALC-MCNC: 2.3 GM/DL
GLUCOSE SERPL-MCNC: 86 MG/DL (ref 65–99)
HCT VFR BLD AUTO: 36.5 % (ref 34–46.6)
HDLC SERPL-MCNC: 82 MG/DL (ref 40–60)
HGB BLD-MCNC: 11.8 G/DL (ref 12–15.9)
LDLC SERPL CALC-MCNC: 78 MG/DL (ref 0–100)
MCH RBC QN AUTO: 30.3 PG (ref 26.6–33)
MCHC RBC AUTO-ENTMCNC: 32.3 G/DL (ref 31.5–35.7)
MCV RBC AUTO: 93.6 FL (ref 79–97)
PLATELET # BLD AUTO: 261 10*3/MM3 (ref 140–450)
POTASSIUM SERPL-SCNC: 4.1 MMOL/L (ref 3.5–5.2)
PROT SERPL-MCNC: 6.4 G/DL (ref 6–8.5)
RBC # BLD AUTO: 3.9 10*6/MM3 (ref 3.77–5.28)
SODIUM SERPL-SCNC: 143 MMOL/L (ref 136–145)
TRIGL SERPL-MCNC: 129 MG/DL (ref 0–150)
VLDLC SERPL CALC-MCNC: 22 MG/DL (ref 5–40)
WBC # BLD AUTO: 5.29 10*3/MM3 (ref 3.4–10.8)

## 2024-08-28 ENCOUNTER — PATIENT MESSAGE (OUTPATIENT)
Dept: OBSTETRICS AND GYNECOLOGY | Age: 54
End: 2024-08-28
Payer: COMMERCIAL

## 2024-09-23 DIAGNOSIS — E03.8 OTHER SPECIFIED HYPOTHYROIDISM: ICD-10-CM

## 2024-09-23 RX ORDER — LEVOTHYROXINE SODIUM 125 UG/1
125 TABLET ORAL DAILY
Qty: 90 TABLET | Refills: 3 | Status: SHIPPED | OUTPATIENT
Start: 2024-09-23

## 2025-01-28 ENCOUNTER — OFFICE VISIT (OUTPATIENT)
Dept: INTERNAL MEDICINE | Facility: CLINIC | Age: 55
End: 2025-01-28
Payer: COMMERCIAL

## 2025-01-28 VITALS
OXYGEN SATURATION: 98 % | DIASTOLIC BLOOD PRESSURE: 78 MMHG | SYSTOLIC BLOOD PRESSURE: 128 MMHG | TEMPERATURE: 96.6 F | HEIGHT: 68 IN | BODY MASS INDEX: 25.01 KG/M2 | WEIGHT: 165 LBS | HEART RATE: 91 BPM

## 2025-01-28 DIAGNOSIS — E78.00 PURE HYPERCHOLESTEROLEMIA: ICD-10-CM

## 2025-01-28 DIAGNOSIS — E03.9 ACQUIRED HYPOTHYROIDISM: Primary | ICD-10-CM

## 2025-01-28 DIAGNOSIS — G89.29 CHRONIC INTRACTABLE HEADACHE, UNSPECIFIED HEADACHE TYPE: ICD-10-CM

## 2025-01-28 DIAGNOSIS — R73.03 BORDERLINE DIABETES: ICD-10-CM

## 2025-01-28 DIAGNOSIS — E66.3 OVERWEIGHT (BMI 25.0-29.9): ICD-10-CM

## 2025-01-28 DIAGNOSIS — N90.4 LICHEN SCLEROSUS OF FEMALE GENITALIA: ICD-10-CM

## 2025-01-28 DIAGNOSIS — I10 HYPERTENSION, UNSPECIFIED TYPE: ICD-10-CM

## 2025-01-28 DIAGNOSIS — R51.9 CHRONIC INTRACTABLE HEADACHE, UNSPECIFIED HEADACHE TYPE: ICD-10-CM

## 2025-01-28 DIAGNOSIS — I10 BENIGN ESSENTIAL HTN: ICD-10-CM

## 2025-01-28 PROCEDURE — 99214 OFFICE O/P EST MOD 30 MIN: CPT | Performed by: FAMILY MEDICINE

## 2025-01-28 RX ORDER — AMITRIPTYLINE HYDROCHLORIDE 150 MG/1
150 TABLET ORAL
Qty: 90 TABLET | Refills: 3 | Status: SHIPPED | OUTPATIENT
Start: 2025-01-28

## 2025-01-28 RX ORDER — METOPROLOL SUCCINATE 25 MG/1
25 TABLET, EXTENDED RELEASE ORAL DAILY
Qty: 90 TABLET | Refills: 3 | Status: SHIPPED | OUTPATIENT
Start: 2025-01-28 | End: 2025-01-28

## 2025-01-28 RX ORDER — CLOBETASOL PROPIONATE 0.5 MG/G
1 CREAM TOPICAL 2 TIMES DAILY
Qty: 45 G | Refills: 11 | Status: SHIPPED | OUTPATIENT
Start: 2025-01-28

## 2025-01-28 RX ORDER — SIMVASTATIN 20 MG
20 TABLET ORAL DAILY
Qty: 90 TABLET | Refills: 3 | Status: SHIPPED | OUTPATIENT
Start: 2025-01-28

## 2025-01-28 NOTE — PROGRESS NOTES
Subjective     Chief Complaint   Patient presents with    Hypothyroidism       History of Present Illness    Patient's PMR from outside medical facility reviewed and noted.    Heidi Fernandez is a 54 y.o. female who presents for a routine office visit.  Patient comes in for recheck of her lichen sclerosus.  She needs refills of her cream for this at this time states she does very well with the cream.  Her hypertension remains stable at this time she was able to come off of her metoprolol tartrate is doing very well without it.  She states she is felt very fatigued when she did take the medication.    Patient is due to recheck her thyroid at this time last levels were elevated we will also recheck hemoglobin A1c as she has been prediabetic in the past.        Past Medical History:   Past Medical History:   Diagnosis Date    Anemia     Cyst, ovary, follicular     Hypothyroidism     Lichen sclerosus     Migraine     Pancreatitis Sept 2020    Scoliosis 1978     Past Surgical History:  Past Surgical History:   Procedure Laterality Date    BILATERAL SALPINGO OOPHORECTOMY      BREAST BIOPSY Right     BRONCHOSCOPY N/A 9/2/2020    Procedure: BRONCHOSCOPY;  Surgeon: Tyler Coburn MD;  Location:  [facility] OR;  Service: Cardiothoracic;  Laterality: N/A;    COLONOSCOPY N/A 1/8/2020    The entire examined colon is normal on direct and retroflexion views; No specimens collected; Repeat 10 years    DILATATION AND CURETTAGE  09/13/2012    HYSTERECTOMY  10/29/2012    TLH w/ BSO due to bleeding issues    LAPAROSCOPY WITH LASER      OOPHORECTOMY      THORACOSCOPY Right 9/2/2020    Procedure: THORACOSCOPY VIDEO ASSISTED WITH RIGHT DECORTICATION; RIGHT INTERCOSTAL NERVE BLOCK;  Surgeon: Tyler Coburn MD;  Location:  [facility] OR;  Service: Cardiothoracic;  Laterality: Right;     Social History:  reports that she has never smoked. She has never used smokeless tobacco. She reports that she does not drink alcohol and does not use  drugs.    Family History: family history includes Arthritis in her mother; Breast cancer (age of onset: 45) in her cousin; Breast cancer (age of onset: 64) in her maternal aunt; Heart disease in her mother; Hypertension in her mother; No Known Problems in her brother, daughter, father, maternal grandmother, paternal aunt, paternal grandmother, sister, son, and another family member; Other in her mother; Stroke in her mother.      Allergies:  No Known Allergies  Medications:  Prior to Admission medications    Medication Sig Start Date End Date Taking? Authorizing Provider   amitriptyline (ELAVIL) 150 MG tablet Take 1 tablet by mouth every night at bedtime. 1/28/25   Ponce Biswas MD   clobetasol (TEMOVATE) 0.05 % cream Apply 1 application  topically to the appropriate area as directed 2 (Two) Times a Day. 1/16/24   Ponce Biswas MD   estrogens, conjugated, (PREMARIN) 0.3 MG tablet Take 1 tablet by mouth Daily. 2/7/24   Ponce Biswas MD   levothyroxine (SYNTHROID, LEVOTHROID) 125 MCG tablet Take 1 tablet by mouth Daily. 9/23/24   Ponce Biswas MD   Magnesium Hydroxide (ARMANDO MILK OF MAGNESIA PO) Take 2 tablets by mouth 2 (Two) Times a Day.    Provider, MD Jeana   metoprolol succinate XL (TOPROL-XL) 25 MG 24 hr tablet Take 1 tablet by mouth Daily.  Patient not taking: Reported on 1/28/2025 1/28/25   Ponce Biswas MD   simvastatin (ZOCOR) 20 MG tablet Take 1 tablet by mouth Daily. 1/28/25   Ponce Biswas MD   triamcinolone (KENALOG) 0.1 % cream Apply  topically to the appropriate area as directed 2 (Two) Times a Day.    ProviderJeana MD   amitriptyline (ELAVIL) 150 MG tablet Take 1 tablet by mouth every night at bedtime. 2/7/24 1/28/25  Ponce Biswas MD   metoprolol succinate XL (TOPROL-XL) 25 MG 24 hr tablet Take 1 tablet by mouth Daily. 2/7/24 1/28/25  Ponce Biswas MD   simvastatin (ZOCOR) 20 MG tablet  "Take 1 tablet by mouth Daily. 2/7/24 1/28/25  Ponce Biswas MD       REBECA: Over the last two weeks, how often have you been bothered by the following problems?  Feeling nervous, anxious or on edge: Not at all  Not being able to stop or control worrying: Not at all  Worrying too much about different things: Not at all  Trouble Relaxing: Not at all  Being so restless that it is hard to sit still: Not at all  Becoming easily annoyed or irritable: Not at all  Feeling afraid as if something awful might happen: Not at all  REBECA 7 Total Score: 0  If you checked any problems, how difficult have these problems made it for you to do your work, take care of things at home, or get along with other people: Not difficult at all    PHQ:  Little interest or pleasure in doing things? Not at all   Feeling down, depressed, or hopeless? Not at all   PHQ-2 Total Score 0         0 (Negative screening for depression)  Support given, observe for worsening symptoms        Review of systems   negative unless otherwise specified above in HPI    Objective     Vital Signs: /78 (BP Location: Left arm, Patient Position: Sitting, Cuff Size: Adult)   Pulse 91   Temp 96.6 °F (35.9 °C) (Infrared)   Ht 172.7 cm (68\")   Wt 74.8 kg (165 lb)   LMP 08/31/2012   SpO2 98%   BMI 25.09 kg/m²     Physical Exam  Vitals and nursing note reviewed.   Constitutional:       General: She is not in acute distress.     Appearance: Normal appearance.   HENT:      Head: Normocephalic.   Eyes:      Extraocular Movements: Extraocular movements intact.      Pupils: Pupils are equal, round, and reactive to light.   Cardiovascular:      Rate and Rhythm: Normal rate and regular rhythm.      Heart sounds: Normal heart sounds. No murmur heard.  Pulmonary:      Effort: Pulmonary effort is normal. No respiratory distress.      Breath sounds: Normal breath sounds. No rhonchi or rales.   Abdominal:      General: Abdomen is flat. Bowel sounds are normal.      " Palpations: Abdomen is soft.   Neurological:      General: No focal deficit present.      Mental Status: She is alert.         BMI is >= 25 and <30. (Overweight) The following options were offered after discussion;: weight loss educational material (shared in after visit summary)      Results Reviewed:  Glucose   Date Value Ref Range Status   08/15/2024 86 65 - 99 mg/dL Final   09/07/2020 107 (H) 65 - 99 mg/dL Final     BUN   Date Value Ref Range Status   08/15/2024 8 6 - 20 mg/dL Final   09/07/2020 7 6 - 20 mg/dL Final     Creatinine   Date Value Ref Range Status   08/15/2024 0.69 0.57 - 1.00 mg/dL Final   09/07/2020 0.33 (L) 0.57 - 1.00 mg/dL Final     Sodium   Date Value Ref Range Status   08/15/2024 143 136 - 145 mmol/L Final   09/07/2020 134 (L) 136 - 145 mmol/L Final     Potassium   Date Value Ref Range Status   08/15/2024 4.1 3.5 - 5.2 mmol/L Final   09/07/2020 4.4 3.5 - 5.2 mmol/L Final     Chloride   Date Value Ref Range Status   08/15/2024 104 98 - 107 mmol/L Final   09/07/2020 98 98 - 107 mmol/L Final     CO2   Date Value Ref Range Status   09/07/2020 27.0 22.0 - 29.0 mmol/L Final     Total CO2   Date Value Ref Range Status   08/15/2024 30.3 (H) 22.0 - 29.0 mmol/L Final     Calcium   Date Value Ref Range Status   08/15/2024 9.1 8.6 - 10.5 mg/dL Final   09/07/2020 8.7 8.6 - 10.5 mg/dL Final     ALT (SGPT)   Date Value Ref Range Status   08/15/2024 10 1 - 33 U/L Final   09/07/2020 68 (H) 1 - 33 U/L Final     AST (SGOT)   Date Value Ref Range Status   08/15/2024 15 1 - 32 U/L Final   09/07/2020 63 (H) 1 - 32 U/L Final     WBC   Date Value Ref Range Status   08/15/2024 5.29 3.40 - 10.80 10*3/mm3 Final     Hematocrit   Date Value Ref Range Status   08/15/2024 36.5 34.0 - 46.6 % Final   09/14/2020 30.8 (L) 34.0 - 46.6 % Final     Platelets   Date Value Ref Range Status   08/15/2024 261 140 - 450 10*3/mm3 Final   09/14/2020 1,099 (C) 140 - 450 10*3/mm3 Final     Total Cholesterol   Date Value Ref Range Status    09/01/2020 66 0 - 200 mg/dL Final     Triglycerides   Date Value Ref Range Status   08/15/2024 129 0 - 150 mg/dL Final   09/01/2020 141 0 - 150 mg/dL Final     HDL Cholesterol   Date Value Ref Range Status   08/15/2024 82 (H) 40 - 60 mg/dL Final   09/01/2020 17 (L) 40 - 60 mg/dL Final     LDL Chol Calc (NIH)   Date Value Ref Range Status   08/15/2024 78 0 - 100 mg/dL Final     LDL/HDL RATIO   Date Value Ref Range Status   02/02/2021 1.97  Final     Hemoglobin A1C   Date Value Ref Range Status   02/02/2021 5.90 (H) 4.80 - 5.60 % Final     Comment:     Hemoglobin A1C Ranges:  Increased Risk for Diabetes  5.7% to 6.4%  Diabetes                     >= 6.5%  Diabetic Goal                < 7.0%     09/01/2020 6.10 (H) 4.80 - 5.60 % Final             Procedure   Procedures       Assessment / Plan     Assessment/Plan:   Diagnosis Plan   1. Acquired hypothyroidism  TSH      2. Lichen sclerosus of female genitalia  clobetasol propionate (TEMOVATE) 0.05 % cream    Doing well with Temovate cream. No evidence of lichens today.      3. Borderline diabetes  Hemoglobin A1c      4. Benign essential HTN        5. Overweight (BMI 25.0-29.9)              Return in about 6 months (around 7/28/2025) for Recheck. unless patient needs to be seen sooner or acute issues arise.      I have discussed the patient results/orders and and plan/recommendation with them at today's visit.      Signed by:    Ponce Biswas MD Date: 01/28/25

## 2025-01-29 LAB
HBA1C MFR BLD: 5.9 % (ref 4.8–5.6)
TSH SERPL DL<=0.005 MIU/L-ACNC: 1.09 UIU/ML (ref 0.45–4.5)

## 2025-04-30 DIAGNOSIS — N95.1 MENOPAUSAL SYMPTOMS: ICD-10-CM

## 2025-06-27 NOTE — TELEPHONE ENCOUNTER
Pt left voicemail asking for a refill of her thyroid medication. Said she had to go see her PCP since her last thyroid lab with us because it was abnormal but she said her PCP has now said its normal and to continue medication at same dose. I will call her and have her get those labs faxed to us but are you still going to refill or does she need to get it from her PCP now?    Instructions: This plan will send the code FBSE to the PM system.  DO NOT or CHANGE the price. Price (Do Not Change): 0.00 Detail Level: Generalized

## 2025-08-06 ENCOUNTER — OFFICE VISIT (OUTPATIENT)
Dept: OBSTETRICS AND GYNECOLOGY | Age: 55
End: 2025-08-06
Payer: COMMERCIAL

## 2025-08-06 VITALS
SYSTOLIC BLOOD PRESSURE: 108 MMHG | BODY MASS INDEX: 25.28 KG/M2 | DIASTOLIC BLOOD PRESSURE: 76 MMHG | HEIGHT: 68 IN | WEIGHT: 166.8 LBS

## 2025-08-06 DIAGNOSIS — Z01.419 WOMEN'S ANNUAL ROUTINE GYNECOLOGICAL EXAMINATION: Primary | ICD-10-CM

## 2025-08-06 DIAGNOSIS — Z12.31 ENCOUNTER FOR SCREENING MAMMOGRAM FOR MALIGNANT NEOPLASM OF BREAST: ICD-10-CM

## 2025-08-26 ENCOUNTER — TELEPHONE (OUTPATIENT)
Dept: INTERNAL MEDICINE | Facility: CLINIC | Age: 55
End: 2025-08-26
Payer: COMMERCIAL

## (undated) DEVICE — SPNG DISSCT CHRRY 3/8IN STRL PK/5

## (undated) DEVICE — ANTIBACTERIAL UNDYED BRAIDED (POLYGLACTIN 910), SYNTHETIC ABSORBABLE SUTURE: Brand: COATED VICRYL

## (undated) DEVICE — SUT SILK 2/0 SUTUPAK TIES 24IN SA75H

## (undated) DEVICE — ELECTRD BLD EZ CLN MOD 6.5IN

## (undated) DEVICE — MASK,OXYGEN,MED CONC,ADLT,7' TUB, UC: Brand: PENDING

## (undated) DEVICE — APPL CHLORAPREP HI/LITE 26ML ORNG

## (undated) DEVICE — CONTAINER,SPECIMEN,OR STERILE,4OZ: Brand: MEDLINE

## (undated) DEVICE — SUT VIC 0 CT1 CR8 27IN UD VCPP41D

## (undated) DEVICE — SUT VIC 2/0 UR6 27IN VCP602H

## (undated) DEVICE — KT NDL GUIDE STRL 18GA

## (undated) DEVICE — SENSR O2 OXIMAX FNGR A/ 18IN NONSTR

## (undated) DEVICE — 3M™ IOBAN™ 2 ANTIMICROBIAL INCISE DRAPE 6651EZ: Brand: IOBAN™ 2

## (undated) DEVICE — UTILITY MARKER W/MED LABELS: Brand: MEDLINE

## (undated) DEVICE — TOTAL TRAY, 16FR 10ML SIL FOLEY, URN: Brand: MEDLINE

## (undated) DEVICE — GLV SURG BIOGEL LTX PF 7 1/2

## (undated) DEVICE — YANKAUER,BULB TIP WITH VENT: Brand: ARGYLE

## (undated) DEVICE — DRSNG TELFA PAD NONADH STR 1S 3X8IN

## (undated) DEVICE — SUT GUT CHRM 3/0 SH 27IN G122H

## (undated) DEVICE — ELECTRD BLD EZ CLN STD 6.5IN

## (undated) DEVICE — ENDOGATOR AUXILIARY WATER JET CONNECTOR: Brand: ENDOGATOR

## (undated) DEVICE — SUT SILK 3/0 SUTUPAK TIES 24IN SA74H

## (undated) DEVICE — TRAP,MUCUS SPECIMEN,40CC: Brand: MEDLINE

## (undated) DEVICE — KT ANTI FOG W/FLD AND SPNG

## (undated) DEVICE — SUT VIC 1 XLH 27IN VCP583G

## (undated) DEVICE — SUT SILK 4/0 SUTUPAK TIES 24IN SA73H

## (undated) DEVICE — Device: Brand: DEFENDO AIR/WATER/SUCTION AND BIOPSY VALVE

## (undated) DEVICE — COVER,MAYO STAND,STERILE: Brand: MEDLINE

## (undated) DEVICE — THE CHANNEL CLEANING BRUSH IS A NYLON FLEXI BRUSH ATTACHED TO A FLEXIBLE PLASTIC SHEATH DESIGNED TO SAFELY REMOVE DEBRIS FROM FLEXIBLE ENDOSCOPES.

## (undated) DEVICE — TOWEL,OR,DSP,ST,BLUE,STD,4/PK,20PK/CS: Brand: MEDLINE

## (undated) DEVICE — SKIN AFFIX SURG ADHESIVE 72/CS 0.55ML: Brand: MEDLINE

## (undated) DEVICE — SUT PROLN 4/0 RB1 D/A 36IN 8557H

## (undated) DEVICE — SUT SILK 0 SUTUPAK TIES 24IN SA76G

## (undated) DEVICE — CLTH CLENS READYCLEANSE PERI CARE PK/5

## (undated) DEVICE — SUT SILK 2/0 FS BLK 18IN 685G

## (undated) DEVICE — DISSCT SECTO 1PC 1P/U 5MMX35CM STRL

## (undated) DEVICE — BANDAGE,GAUZE,BULKEE II,4.5"X4.1YD,STRL: Brand: MEDLINE

## (undated) DEVICE — TP UMB COTN 1/8X36 U12T

## (undated) DEVICE — 3M™ IOBAN™ 2 ANTIMICROBIAL INCISE DRAPE 6650EZ: Brand: IOBAN™ 2

## (undated) DEVICE — DRAPE,UTILITY,TAPE,15X26,STERILE: Brand: MEDLINE

## (undated) DEVICE — OASIS DRAIN, SINGLE, INLINE & ATS COMPATIBLE: Brand: OASIS

## (undated) DEVICE — TBG SMPL FLTR LINE NASL 02/C02 A/ BX/100

## (undated) DEVICE — GLV SURG BIOGEL LTX PF 6 1/2

## (undated) DEVICE — PK TURNOVER RM ADV

## (undated) DEVICE — SPONGE,LAP,12"X12",XR,ST,5/PK,40PK/CS: Brand: MEDLINE

## (undated) DEVICE — CVR HNDL LIGHT RIGID

## (undated) DEVICE — PAD MINOR UNIVERSAL: Brand: MEDLINE INDUSTRIES, INC.

## (undated) DEVICE — ELECTRODE,ECG,STRESS,FOAM,50PK: Brand: MEDLINE

## (undated) DEVICE — 30977 SEE SHARP - ENHANCED INTRAOPERATIVE LAPAROSCOPE CLEANING & DEFOGGING: Brand: 30977 SEE SHARP - ENHANCED INTRAOPERATIVE LAPAROSCOPE CLEANING & DEFOGGING

## (undated) DEVICE — CUFF,BP,DISP,1 TUBE,ADULT,HP: Brand: MEDLINE

## (undated) DEVICE — SUT MNCRYL 4/0 PS2 27IN UD MCP426H